# Patient Record
Sex: MALE | Race: OTHER | HISPANIC OR LATINO
[De-identification: names, ages, dates, MRNs, and addresses within clinical notes are randomized per-mention and may not be internally consistent; named-entity substitution may affect disease eponyms.]

---

## 2017-01-09 ENCOUNTER — APPOINTMENT (OUTPATIENT)
Dept: OPHTHALMOLOGY | Facility: CLINIC | Age: 47
End: 2017-01-09

## 2017-01-09 ENCOUNTER — OUTPATIENT (OUTPATIENT)
Dept: OUTPATIENT SERVICES | Facility: HOSPITAL | Age: 47
LOS: 1 days | End: 2017-01-09

## 2017-01-30 DIAGNOSIS — H40.10X0 UNSPECIFIED OPEN-ANGLE GLAUCOMA, STAGE UNSPECIFIED: ICD-10-CM

## 2017-04-14 ENCOUNTER — APPOINTMENT (OUTPATIENT)
Dept: INTERNAL MEDICINE | Facility: CLINIC | Age: 47
End: 2017-04-14

## 2017-04-14 ENCOUNTER — OUTPATIENT (OUTPATIENT)
Dept: OUTPATIENT SERVICES | Facility: HOSPITAL | Age: 47
LOS: 1 days | End: 2017-04-14
Payer: SELF-PAY

## 2017-04-14 VITALS
HEIGHT: 65 IN | WEIGHT: 170 LBS | DIASTOLIC BLOOD PRESSURE: 80 MMHG | SYSTOLIC BLOOD PRESSURE: 128 MMHG | HEART RATE: 67 BPM | BODY MASS INDEX: 28.32 KG/M2

## 2017-04-14 DIAGNOSIS — I10 ESSENTIAL (PRIMARY) HYPERTENSION: ICD-10-CM

## 2017-04-14 PROCEDURE — G0463: CPT

## 2017-04-20 DIAGNOSIS — E11.9 TYPE 2 DIABETES MELLITUS WITHOUT COMPLICATIONS: ICD-10-CM

## 2017-04-20 DIAGNOSIS — K76.0 FATTY (CHANGE OF) LIVER, NOT ELSEWHERE CLASSIFIED: ICD-10-CM

## 2017-04-20 DIAGNOSIS — M79.672 PAIN IN LEFT FOOT: ICD-10-CM

## 2017-04-21 ENCOUNTER — CHART COPY (OUTPATIENT)
Age: 47
End: 2017-04-21

## 2017-04-28 ENCOUNTER — APPOINTMENT (OUTPATIENT)
Dept: INTERNAL MEDICINE | Facility: CLINIC | Age: 47
End: 2017-04-28

## 2017-04-28 ENCOUNTER — OUTPATIENT (OUTPATIENT)
Dept: OUTPATIENT SERVICES | Facility: HOSPITAL | Age: 47
LOS: 1 days | End: 2017-04-28
Payer: SELF-PAY

## 2017-04-28 ENCOUNTER — LABORATORY RESULT (OUTPATIENT)
Age: 47
End: 2017-04-28

## 2017-04-28 VITALS
BODY MASS INDEX: 28.16 KG/M2 | OXYGEN SATURATION: 96 % | HEIGHT: 65 IN | SYSTOLIC BLOOD PRESSURE: 120 MMHG | DIASTOLIC BLOOD PRESSURE: 80 MMHG | HEART RATE: 86 BPM | WEIGHT: 169 LBS

## 2017-04-28 DIAGNOSIS — M79.672 PAIN IN LEFT FOOT: ICD-10-CM

## 2017-04-28 DIAGNOSIS — K76.0 FATTY (CHANGE OF) LIVER, NOT ELSEWHERE CLASSIFIED: ICD-10-CM

## 2017-04-28 DIAGNOSIS — E11.9 TYPE 2 DIABETES MELLITUS WITHOUT COMPLICATIONS: ICD-10-CM

## 2017-04-28 DIAGNOSIS — I10 ESSENTIAL (PRIMARY) HYPERTENSION: ICD-10-CM

## 2017-04-28 DIAGNOSIS — Z23 ENCOUNTER FOR IMMUNIZATION: ICD-10-CM

## 2017-04-28 LAB
CHOLEST SERPL-MCNC: 150 MG/DL — SIGNIFICANT CHANGE UP (ref 10–199)
FRUCTOSAMINE SERPL-MCNC: 370 UMOL/L — HIGH (ref 205–285)
HDLC SERPL-MCNC: 26 MG/DL — LOW (ref 40–125)
LIPID PNL WITH DIRECT LDL SERPL: 81 MG/DL — SIGNIFICANT CHANGE UP
TOTAL CHOLESTEROL/HDL RATIO MEASUREMENT: 5.8 RATIO — SIGNIFICANT CHANGE UP (ref 3.4–9.6)
TRIGL SERPL-MCNC: 217 MG/DL — HIGH (ref 10–149)

## 2017-04-28 PROCEDURE — G0463: CPT

## 2017-04-28 PROCEDURE — 80061 LIPID PANEL: CPT

## 2017-04-28 PROCEDURE — 82985 ASSAY OF GLYCATED PROTEIN: CPT

## 2017-05-09 DIAGNOSIS — E78.1 PURE HYPERGLYCERIDEMIA: ICD-10-CM

## 2017-05-22 ENCOUNTER — APPOINTMENT (OUTPATIENT)
Age: 47
End: 2017-05-22

## 2017-05-22 ENCOUNTER — APPOINTMENT (OUTPATIENT)
Dept: OPHTHALMOLOGY | Facility: CLINIC | Age: 47
End: 2017-05-22

## 2017-05-22 ENCOUNTER — OUTPATIENT (OUTPATIENT)
Dept: OUTPATIENT SERVICES | Facility: HOSPITAL | Age: 47
LOS: 1 days | End: 2017-05-22

## 2017-05-26 ENCOUNTER — APPOINTMENT (OUTPATIENT)
Dept: PODIATRY | Facility: HOSPITAL | Age: 47
End: 2017-05-26

## 2017-05-26 ENCOUNTER — OUTPATIENT (OUTPATIENT)
Dept: OUTPATIENT SERVICES | Facility: HOSPITAL | Age: 47
LOS: 1 days | End: 2017-05-26
Payer: SELF-PAY

## 2017-05-26 VITALS
DIASTOLIC BLOOD PRESSURE: 95 MMHG | HEART RATE: 83 BPM | HEIGHT: 65 IN | BODY MASS INDEX: 28.32 KG/M2 | SYSTOLIC BLOOD PRESSURE: 145 MMHG | WEIGHT: 170 LBS

## 2017-05-26 DIAGNOSIS — M79.609 PAIN IN UNSPECIFIED LIMB: ICD-10-CM

## 2017-05-26 DIAGNOSIS — M72.2 PLANTAR FASCIAL FIBROMATOSIS: ICD-10-CM

## 2017-05-26 PROCEDURE — G0463: CPT

## 2017-05-26 PROCEDURE — 73630 X-RAY EXAM OF FOOT: CPT

## 2017-05-26 PROCEDURE — 73630 X-RAY EXAM OF FOOT: CPT | Mod: 26,50

## 2017-05-30 ENCOUNTER — LABORATORY RESULT (OUTPATIENT)
Age: 47
End: 2017-05-30

## 2017-05-30 ENCOUNTER — APPOINTMENT (OUTPATIENT)
Dept: INTERNAL MEDICINE | Facility: CLINIC | Age: 47
End: 2017-05-30

## 2017-05-30 ENCOUNTER — OUTPATIENT (OUTPATIENT)
Dept: OUTPATIENT SERVICES | Facility: HOSPITAL | Age: 47
LOS: 1 days | End: 2017-05-30
Payer: SELF-PAY

## 2017-05-30 VITALS
HEIGHT: 65 IN | SYSTOLIC BLOOD PRESSURE: 120 MMHG | WEIGHT: 170 LBS | OXYGEN SATURATION: 97 % | HEART RATE: 68 BPM | BODY MASS INDEX: 28.32 KG/M2 | DIASTOLIC BLOOD PRESSURE: 78 MMHG

## 2017-05-30 DIAGNOSIS — K86.1 OTHER CHRONIC PANCREATITIS: ICD-10-CM

## 2017-05-30 DIAGNOSIS — M20.40 OTHER HAMMER TOE(S) (ACQUIRED), UNSPECIFIED FOOT: ICD-10-CM

## 2017-05-30 DIAGNOSIS — R51 HEADACHE: ICD-10-CM

## 2017-05-30 DIAGNOSIS — I10 ESSENTIAL (PRIMARY) HYPERTENSION: ICD-10-CM

## 2017-05-30 DIAGNOSIS — E11.9 TYPE 2 DIABETES MELLITUS WITHOUT COMPLICATIONS: ICD-10-CM

## 2017-05-30 PROCEDURE — 82985 ASSAY OF GLYCATED PROTEIN: CPT

## 2017-05-30 PROCEDURE — G0463: CPT

## 2017-05-31 LAB — FRUCTOSAMINE SERPL-MCNC: 298 UMOL/L — HIGH (ref 205–285)

## 2017-06-09 DIAGNOSIS — R51 HEADACHE: ICD-10-CM

## 2017-06-09 DIAGNOSIS — K86.1 OTHER CHRONIC PANCREATITIS: ICD-10-CM

## 2017-06-09 DIAGNOSIS — E11.9 TYPE 2 DIABETES MELLITUS WITHOUT COMPLICATIONS: ICD-10-CM

## 2017-06-09 DIAGNOSIS — E78.5 HYPERLIPIDEMIA, UNSPECIFIED: ICD-10-CM

## 2017-06-30 ENCOUNTER — OUTPATIENT (OUTPATIENT)
Dept: OUTPATIENT SERVICES | Facility: HOSPITAL | Age: 47
LOS: 1 days | End: 2017-06-30
Payer: SELF-PAY

## 2017-06-30 ENCOUNTER — APPOINTMENT (OUTPATIENT)
Dept: PODIATRY | Facility: HOSPITAL | Age: 47
End: 2017-06-30

## 2017-06-30 VITALS — DIASTOLIC BLOOD PRESSURE: 87 MMHG | HEART RATE: 80 BPM | SYSTOLIC BLOOD PRESSURE: 135 MMHG

## 2017-06-30 VITALS — BODY MASS INDEX: 28.32 KG/M2 | HEIGHT: 65 IN | WEIGHT: 170 LBS | RESPIRATION RATE: 16 BRPM

## 2017-06-30 DIAGNOSIS — M72.2 PLANTAR FASCIAL FIBROMATOSIS: ICD-10-CM

## 2017-06-30 DIAGNOSIS — M20.40 OTHER HAMMER TOE(S) (ACQUIRED), UNSPECIFIED FOOT: ICD-10-CM

## 2017-06-30 DIAGNOSIS — M79.609 PAIN IN UNSPECIFIED LIMB: ICD-10-CM

## 2017-06-30 DIAGNOSIS — E11.9 TYPE 2 DIABETES MELLITUS WITHOUT COMPLICATIONS: ICD-10-CM

## 2017-06-30 PROCEDURE — G0463: CPT

## 2017-07-12 DIAGNOSIS — H40.003 PREGLAUCOMA, UNSPECIFIED, BILATERAL: ICD-10-CM

## 2017-07-28 ENCOUNTER — OUTPATIENT (OUTPATIENT)
Dept: OUTPATIENT SERVICES | Facility: HOSPITAL | Age: 47
LOS: 1 days | End: 2017-07-28
Payer: SELF-PAY

## 2017-07-28 ENCOUNTER — APPOINTMENT (OUTPATIENT)
Dept: PODIATRY | Facility: HOSPITAL | Age: 47
End: 2017-07-28

## 2017-07-28 VITALS
DIASTOLIC BLOOD PRESSURE: 87 MMHG | HEIGHT: 65 IN | BODY MASS INDEX: 28.32 KG/M2 | HEART RATE: 87 BPM | SYSTOLIC BLOOD PRESSURE: 136 MMHG | RESPIRATION RATE: 16 BRPM | WEIGHT: 170 LBS

## 2017-07-28 DIAGNOSIS — M72.2 PLANTAR FASCIAL FIBROMATOSIS: ICD-10-CM

## 2017-07-28 DIAGNOSIS — E11.9 TYPE 2 DIABETES MELLITUS WITHOUT COMPLICATIONS: ICD-10-CM

## 2017-07-28 DIAGNOSIS — M79.609 PAIN IN UNSPECIFIED LIMB: ICD-10-CM

## 2017-07-28 PROCEDURE — G0463: CPT

## 2017-09-08 ENCOUNTER — APPOINTMENT (OUTPATIENT)
Dept: PODIATRY | Facility: HOSPITAL | Age: 47
End: 2017-09-08

## 2017-09-08 ENCOUNTER — OUTPATIENT (OUTPATIENT)
Dept: OUTPATIENT SERVICES | Facility: HOSPITAL | Age: 47
LOS: 1 days | End: 2017-09-08
Payer: SELF-PAY

## 2017-09-08 VITALS
DIASTOLIC BLOOD PRESSURE: 100 MMHG | WEIGHT: 168 LBS | HEART RATE: 74 BPM | BODY MASS INDEX: 27.99 KG/M2 | SYSTOLIC BLOOD PRESSURE: 144 MMHG | HEIGHT: 65 IN | RESPIRATION RATE: 16 BRPM

## 2017-09-08 DIAGNOSIS — M72.2 PLANTAR FASCIAL FIBROMATOSIS: ICD-10-CM

## 2017-09-08 DIAGNOSIS — E11.9 TYPE 2 DIABETES MELLITUS WITHOUT COMPLICATIONS: ICD-10-CM

## 2017-09-08 DIAGNOSIS — M79.609 PAIN IN UNSPECIFIED LIMB: ICD-10-CM

## 2017-09-08 PROCEDURE — G0463: CPT

## 2017-09-29 ENCOUNTER — APPOINTMENT (OUTPATIENT)
Dept: INTERNAL MEDICINE | Facility: CLINIC | Age: 47
End: 2017-09-29

## 2017-10-02 ENCOUNTER — APPOINTMENT (OUTPATIENT)
Dept: INTERNAL MEDICINE | Facility: CLINIC | Age: 47
End: 2017-10-02

## 2017-11-20 ENCOUNTER — APPOINTMENT (OUTPATIENT)
Dept: OPHTHALMOLOGY | Facility: CLINIC | Age: 47
End: 2017-11-20

## 2017-11-20 ENCOUNTER — OUTPATIENT (OUTPATIENT)
Dept: OUTPATIENT SERVICES | Facility: HOSPITAL | Age: 47
LOS: 1 days | End: 2017-11-20

## 2017-11-27 DIAGNOSIS — H40.003 PREGLAUCOMA, UNSPECIFIED, BILATERAL: ICD-10-CM

## 2018-02-26 ENCOUNTER — OUTPATIENT (OUTPATIENT)
Dept: OUTPATIENT SERVICES | Facility: HOSPITAL | Age: 48
LOS: 1 days | End: 2018-02-26
Payer: SELF-PAY

## 2018-02-26 ENCOUNTER — APPOINTMENT (OUTPATIENT)
Dept: INTERNAL MEDICINE | Facility: CLINIC | Age: 48
End: 2018-02-26

## 2018-02-26 VITALS
SYSTOLIC BLOOD PRESSURE: 130 MMHG | DIASTOLIC BLOOD PRESSURE: 60 MMHG | WEIGHT: 163 LBS | HEART RATE: 64 BPM | HEIGHT: 65 IN | BODY MASS INDEX: 27.16 KG/M2

## 2018-02-26 DIAGNOSIS — I10 ESSENTIAL (PRIMARY) HYPERTENSION: ICD-10-CM

## 2018-02-26 DIAGNOSIS — Z23 ENCOUNTER FOR IMMUNIZATION: ICD-10-CM

## 2018-02-26 DIAGNOSIS — J06.9 ACUTE UPPER RESPIRATORY INFECTION, UNSPECIFIED: ICD-10-CM

## 2018-02-26 DIAGNOSIS — E11.9 TYPE 2 DIABETES MELLITUS WITHOUT COMPLICATIONS: ICD-10-CM

## 2018-02-26 PROCEDURE — G0463: CPT

## 2018-02-26 PROCEDURE — G0008: CPT

## 2018-02-26 PROCEDURE — 90688 IIV4 VACCINE SPLT 0.5 ML IM: CPT

## 2018-02-26 PROCEDURE — 83036 HEMOGLOBIN GLYCOSYLATED A1C: CPT

## 2018-02-28 LAB
ALBUMIN SERPL ELPH-MCNC: 4.4 G/DL
ALP BLD-CCNC: 161 U/L
ALT SERPL-CCNC: 128 U/L
ANION GAP SERPL CALC-SCNC: 16 MMOL/L
AST SERPL-CCNC: 51 U/L
BASOPHILS # BLD AUTO: 0.03 K/UL
BASOPHILS NFR BLD AUTO: 0.3 %
BILIRUB SERPL-MCNC: 0.7 MG/DL
BUN SERPL-MCNC: 20 MG/DL
CALCIUM SERPL-MCNC: 9.4 MG/DL
CHLORIDE SERPL-SCNC: 99 MMOL/L
CHOLEST SERPL-MCNC: 205 MG/DL
CHOLEST/HDLC SERPL: 6.8 RATIO
CO2 SERPL-SCNC: 23 MMOL/L
CREAT SERPL-MCNC: 0.69 MG/DL
EOSINOPHIL # BLD AUTO: 0.22 K/UL
EOSINOPHIL NFR BLD AUTO: 1.9 %
GLUCOSE SERPL-MCNC: 257 MG/DL
HBA1C MFR BLD HPLC: 13.2
HCT VFR BLD CALC: 43.8 %
HDLC SERPL-MCNC: 30 MG/DL
HGB BLD-MCNC: 15.1 G/DL
IMM GRANULOCYTES NFR BLD AUTO: 0.3 %
LDLC SERPL CALC-MCNC: NORMAL
LYMPHOCYTES # BLD AUTO: 2.1 K/UL
LYMPHOCYTES NFR BLD AUTO: 18.2 %
MAN DIFF?: NORMAL
MCHC RBC-ENTMCNC: 31.1 PG
MCHC RBC-ENTMCNC: 34.5 GM/DL
MCV RBC AUTO: 90.1 FL
MONOCYTES # BLD AUTO: 1.01 K/UL
MONOCYTES NFR BLD AUTO: 8.7 %
NEUTROPHILS # BLD AUTO: 8.16 K/UL
NEUTROPHILS NFR BLD AUTO: 70.6 %
PLATELET # BLD AUTO: 183 K/UL
POTASSIUM SERPL-SCNC: 4.4 MMOL/L
PROT SERPL-MCNC: 7.9 G/DL
RBC # BLD: 4.86 M/UL
RBC # FLD: 13.3 %
SODIUM SERPL-SCNC: 138 MMOL/L
TRIGL SERPL-MCNC: 488 MG/DL
WBC # FLD AUTO: 11.55 K/UL

## 2018-04-02 ENCOUNTER — LABORATORY RESULT (OUTPATIENT)
Age: 48
End: 2018-04-02

## 2018-04-02 ENCOUNTER — OUTPATIENT (OUTPATIENT)
Dept: OUTPATIENT SERVICES | Facility: HOSPITAL | Age: 48
LOS: 1 days | End: 2018-04-02
Payer: SELF-PAY

## 2018-04-02 ENCOUNTER — APPOINTMENT (OUTPATIENT)
Dept: INTERNAL MEDICINE | Facility: CLINIC | Age: 48
End: 2018-04-02

## 2018-04-02 VITALS
SYSTOLIC BLOOD PRESSURE: 136 MMHG | OXYGEN SATURATION: 98 % | WEIGHT: 159 LBS | HEART RATE: 72 BPM | DIASTOLIC BLOOD PRESSURE: 78 MMHG | BODY MASS INDEX: 26.49 KG/M2 | HEIGHT: 65 IN

## 2018-04-02 DIAGNOSIS — I10 ESSENTIAL (PRIMARY) HYPERTENSION: ICD-10-CM

## 2018-04-02 PROCEDURE — G0463: CPT

## 2018-04-06 DIAGNOSIS — E11.9 TYPE 2 DIABETES MELLITUS WITHOUT COMPLICATIONS: ICD-10-CM

## 2018-04-06 DIAGNOSIS — E78.5 HYPERLIPIDEMIA, UNSPECIFIED: ICD-10-CM

## 2018-04-06 LAB
ALBUMIN SERPL ELPH-MCNC: 4.8 G/DL
ALP BLD-CCNC: 147 U/L
ALT SERPL-CCNC: 81 U/L
ANION GAP SERPL CALC-SCNC: 14 MMOL/L
AST SERPL-CCNC: 51 U/L
BILIRUB SERPL-MCNC: 0.4 MG/DL
BUN SERPL-MCNC: 21 MG/DL
CALCIUM SERPL-MCNC: 9.8 MG/DL
CHLORIDE SERPL-SCNC: 106 MMOL/L
CHOLEST SERPL-MCNC: 170 MG/DL
CHOLEST/HDLC SERPL: 5.3 RATIO
CO2 SERPL-SCNC: 20 MMOL/L
CREAT SERPL-MCNC: 0.7 MG/DL
GLUCOSE SERPL-MCNC: 127 MG/DL
HBV SURFACE AB SER QL: REACTIVE
HBV SURFACE AG SER QL: NONREACTIVE
HDLC SERPL-MCNC: 32 MG/DL
LDLC SERPL CALC-MCNC: 118 MG/DL
POTASSIUM SERPL-SCNC: 4.3 MMOL/L
PROT SERPL-MCNC: 7.7 G/DL
SODIUM SERPL-SCNC: 140 MMOL/L
TRIGL SERPL-MCNC: 100 MG/DL

## 2018-04-09 ENCOUNTER — APPOINTMENT (OUTPATIENT)
Age: 48
End: 2018-04-09

## 2018-04-09 ENCOUNTER — OUTPATIENT (OUTPATIENT)
Dept: OUTPATIENT SERVICES | Facility: HOSPITAL | Age: 48
LOS: 1 days | End: 2018-04-09

## 2018-04-09 ENCOUNTER — APPOINTMENT (OUTPATIENT)
Dept: OPHTHALMOLOGY | Facility: CLINIC | Age: 48
End: 2018-04-09

## 2018-04-10 ENCOUNTER — FORM ENCOUNTER (OUTPATIENT)
Age: 48
End: 2018-04-10

## 2018-04-11 ENCOUNTER — OUTPATIENT (OUTPATIENT)
Dept: OUTPATIENT SERVICES | Facility: HOSPITAL | Age: 48
LOS: 1 days | End: 2018-04-11
Payer: SELF-PAY

## 2018-04-11 ENCOUNTER — APPOINTMENT (OUTPATIENT)
Dept: ULTRASOUND IMAGING | Facility: CLINIC | Age: 48
End: 2018-04-11
Payer: COMMERCIAL

## 2018-04-11 DIAGNOSIS — E11.9 TYPE 2 DIABETES MELLITUS WITHOUT COMPLICATIONS: ICD-10-CM

## 2018-04-11 DIAGNOSIS — E78.5 HYPERLIPIDEMIA, UNSPECIFIED: ICD-10-CM

## 2018-04-11 PROCEDURE — 76705 ECHO EXAM OF ABDOMEN: CPT

## 2018-04-11 PROCEDURE — 76705 ECHO EXAM OF ABDOMEN: CPT | Mod: 26

## 2018-05-07 ENCOUNTER — APPOINTMENT (OUTPATIENT)
Dept: OPHTHALMOLOGY | Facility: CLINIC | Age: 48
End: 2018-05-07

## 2018-06-15 ENCOUNTER — APPOINTMENT (OUTPATIENT)
Dept: INTERNAL MEDICINE | Facility: CLINIC | Age: 48
End: 2018-06-15

## 2019-05-16 ENCOUNTER — MOBILE ON CALL (OUTPATIENT)
Age: 49
End: 2019-05-16

## 2019-05-17 ENCOUNTER — LABORATORY RESULT (OUTPATIENT)
Age: 49
End: 2019-05-17

## 2019-05-17 ENCOUNTER — APPOINTMENT (OUTPATIENT)
Dept: INTERNAL MEDICINE | Facility: CLINIC | Age: 49
End: 2019-05-17

## 2019-05-17 ENCOUNTER — OUTPATIENT (OUTPATIENT)
Dept: OUTPATIENT SERVICES | Facility: HOSPITAL | Age: 49
LOS: 1 days | End: 2019-05-17
Payer: SELF-PAY

## 2019-05-17 VITALS
BODY MASS INDEX: 26.66 KG/M2 | SYSTOLIC BLOOD PRESSURE: 138 MMHG | HEIGHT: 65 IN | WEIGHT: 160 LBS | DIASTOLIC BLOOD PRESSURE: 70 MMHG

## 2019-05-17 DIAGNOSIS — I10 ESSENTIAL (PRIMARY) HYPERTENSION: ICD-10-CM

## 2019-05-17 DIAGNOSIS — K61.0 ANAL ABSCESS: ICD-10-CM

## 2019-05-17 LAB — HBA1C MFR BLD HPLC: 13.8

## 2019-05-17 PROCEDURE — G0463: CPT

## 2019-05-17 PROCEDURE — 83036 HEMOGLOBIN GLYCOSYLATED A1C: CPT

## 2019-05-17 PROCEDURE — 87491 CHLMYD TRACH DNA AMP PROBE: CPT

## 2019-05-17 RX ORDER — METFORMIN HYDROCHLORIDE 500 MG/1
500 TABLET, COATED ORAL TWICE DAILY
Qty: 60 | Refills: 0 | Status: DISCONTINUED | COMMUNITY
Start: 2017-04-28 | End: 2019-05-17

## 2019-05-17 RX ORDER — GLIPIZIDE 5 MG/1
5 TABLET ORAL TWICE DAILY
Qty: 180 | Refills: 3 | Status: DISCONTINUED | COMMUNITY
Start: 2018-02-26 | End: 2019-05-17

## 2019-05-17 RX ORDER — NAPROXEN SODIUM 375 MG/1
375 TABLET, FILM COATED, EXTENDED RELEASE ORAL
Qty: 60 | Refills: 0 | Status: DISCONTINUED | COMMUNITY
Start: 2017-04-28 | End: 2019-05-17

## 2019-05-17 RX ORDER — CEPHALEXIN 500 MG/1
500 CAPSULE ORAL 4 TIMES DAILY
Qty: 56 | Refills: 0 | Status: DISCONTINUED | COMMUNITY
Start: 2019-05-17 | End: 2019-05-17

## 2019-05-18 LAB
C TRACH RRNA SPEC QL NAA+PROBE: SIGNIFICANT CHANGE UP
C TRACH+GC RRNA ANAL QL PROBE: SIGNIFICANT CHANGE UP
CHLAMYDIA/N. GONORRHEA, ANAL/RECTAL, TMA - SOURCE ANAL: SIGNIFICANT CHANGE UP
N GONORRHOEA RRNA SPEC QL NAA+PROBE: SIGNIFICANT CHANGE UP

## 2019-05-20 ENCOUNTER — OUTPATIENT (OUTPATIENT)
Dept: OUTPATIENT SERVICES | Facility: HOSPITAL | Age: 49
LOS: 1 days | End: 2019-05-20
Payer: SELF-PAY

## 2019-05-20 ENCOUNTER — APPOINTMENT (OUTPATIENT)
Dept: SURGERY | Facility: HOSPITAL | Age: 49
End: 2019-05-20

## 2019-05-20 VITALS
BODY MASS INDEX: 25.66 KG/M2 | HEART RATE: 70 BPM | RESPIRATION RATE: 14 BRPM | DIASTOLIC BLOOD PRESSURE: 83 MMHG | SYSTOLIC BLOOD PRESSURE: 131 MMHG | WEIGHT: 154 LBS | HEIGHT: 65 IN

## 2019-05-20 DIAGNOSIS — R30.0 DYSURIA: ICD-10-CM

## 2019-05-20 DIAGNOSIS — E78.1 PURE HYPERGLYCERIDEMIA: ICD-10-CM

## 2019-05-20 DIAGNOSIS — E11.9 TYPE 2 DIABETES MELLITUS WITHOUT COMPLICATIONS: ICD-10-CM

## 2019-05-20 DIAGNOSIS — L72.3 SEBACEOUS CYST: ICD-10-CM

## 2019-05-20 DIAGNOSIS — I73.9 PERIPHERAL VASCULAR DISEASE, UNSPECIFIED: ICD-10-CM

## 2019-05-20 LAB
C TRACH RRNA SPEC QL NAA+PROBE: DETECTED
C TRACH+GC RRNA SPEC QL PROBE: SIGNIFICANT CHANGE UP
CHLAMYDIA/N. GONORRHEA, ORAL/THROAT, TMA - SOURCE ORAL: SIGNIFICANT CHANGE UP
N GONORRHOEA RRNA SPEC QL NAA+PROBE: SIGNIFICANT CHANGE UP

## 2019-05-20 PROCEDURE — G0463: CPT

## 2019-05-20 RX ORDER — SULFAMETHOXAZOLE AND TRIMETHOPRIM 800; 160 MG/1; MG/1
800-160 TABLET ORAL
Qty: 40 | Refills: 0 | Status: DISCONTINUED | COMMUNITY
Start: 2019-05-17 | End: 2019-05-20

## 2019-05-21 DIAGNOSIS — K61.0 ANAL ABSCESS: ICD-10-CM

## 2019-05-22 NOTE — HISTORY OF PRESENT ILLNESS
[FreeTextEntry1] : perianal lesion [de-identified] : This is a 47yo man, MSM, with HTN, HLD, DMT2, presenting for follow up and also because of a lesion near his anus\par \par #perianal lesion\par - he noticed a bump near his anus for the last week\par - bump has expanded over the course of the last week\par - he said he tried to "squeeze" the lesion and pus and blood came out\par - also for the last 2 days has been taking his friends ampicillin 500mg which his friend got from [place]\par \par He is sexually active with multiple partners. States that he tries to use condoms consistently but that one of his partners the condom broke and partner had lesions on the penis. He produces a picture of his parnter's penis which appears to have herpetic lesions on the tip of the penis. He states he subsequently went to a sexual health center where he was tested for STDs including HIV.\par \par In terms of his other chronic issues the only medication he reports taking is gemfibrozil 500mg.

## 2019-05-22 NOTE — PHYSICAL EXAM

## 2019-05-22 NOTE — ASSESSMENT
[FreeTextEntry1] : 48, MSM, with PMH HTN, uncontrolled DM2, perianal abscess, presenting with perianal abscess and f/u of his chronic conditions\par \par HCM\par - rectal and oral GC/chlamydia screening amplification sent\par - will follow up in 2 weeks re his chronic issues and poor glucose control

## 2019-05-22 NOTE — REVIEW OF SYSTEMS
[Negative] : Heme/Lymph [Fever] : fever [FreeTextEntry8] : redness around the penis [de-identified] : lesion in the perianal area

## 2019-05-24 DIAGNOSIS — K61.0 ANAL ABSCESS: ICD-10-CM

## 2019-05-24 DIAGNOSIS — E78.1 PURE HYPERGLYCERIDEMIA: ICD-10-CM

## 2019-05-24 DIAGNOSIS — E11.9 TYPE 2 DIABETES MELLITUS WITHOUT COMPLICATIONS: ICD-10-CM

## 2019-06-11 ENCOUNTER — LABORATORY RESULT (OUTPATIENT)
Age: 49
End: 2019-06-11

## 2019-06-12 ENCOUNTER — OUTPATIENT (OUTPATIENT)
Dept: OUTPATIENT SERVICES | Facility: HOSPITAL | Age: 49
LOS: 1 days | End: 2019-06-12
Payer: SELF-PAY

## 2019-06-12 ENCOUNTER — APPOINTMENT (OUTPATIENT)
Dept: INTERNAL MEDICINE | Facility: CLINIC | Age: 49
End: 2019-06-12

## 2019-06-12 VITALS
WEIGHT: 156 LBS | HEIGHT: 65 IN | BODY MASS INDEX: 25.99 KG/M2 | SYSTOLIC BLOOD PRESSURE: 122 MMHG | DIASTOLIC BLOOD PRESSURE: 70 MMHG

## 2019-06-12 DIAGNOSIS — E78.1 PURE HYPERGLYCERIDEMIA: ICD-10-CM

## 2019-06-12 DIAGNOSIS — E11.9 TYPE 2 DIABETES MELLITUS WITHOUT COMPLICATIONS: ICD-10-CM

## 2019-06-12 DIAGNOSIS — A74.9 CHLAMYDIAL INFECTION, UNSPECIFIED: ICD-10-CM

## 2019-06-12 DIAGNOSIS — I10 ESSENTIAL (PRIMARY) HYPERTENSION: ICD-10-CM

## 2019-06-12 PROCEDURE — 85027 COMPLETE CBC AUTOMATED: CPT

## 2019-06-12 PROCEDURE — 84443 ASSAY THYROID STIM HORMONE: CPT

## 2019-06-12 PROCEDURE — 80061 LIPID PANEL: CPT

## 2019-06-12 PROCEDURE — 87491 CHLMYD TRACH DNA AMP PROBE: CPT

## 2019-06-12 PROCEDURE — 87591 N.GONORRHOEAE DNA AMP PROB: CPT

## 2019-06-12 PROCEDURE — 80053 COMPREHEN METABOLIC PANEL: CPT

## 2019-06-12 PROCEDURE — 86780 TREPONEMA PALLIDUM: CPT

## 2019-06-12 PROCEDURE — 86709 HEPATITIS A IGM ANTIBODY: CPT

## 2019-06-12 PROCEDURE — 36415 COLL VENOUS BLD VENIPUNCTURE: CPT

## 2019-06-12 PROCEDURE — 87389 HIV-1 AG W/HIV-1&-2 AB AG IA: CPT

## 2019-06-12 PROCEDURE — G0463: CPT

## 2019-06-12 PROCEDURE — 86708 HEPATITIS A ANTIBODY: CPT

## 2019-06-12 PROCEDURE — 82043 UR ALBUMIN QUANTITATIVE: CPT

## 2019-06-12 RX ORDER — AZITHROMYCIN 500 MG/1
500 TABLET, FILM COATED ORAL ONCE
Qty: 2 | Refills: 0 | Status: DISCONTINUED | COMMUNITY
Start: 2019-05-20 | End: 2019-06-12

## 2019-06-12 RX ORDER — AMOXICILLIN AND CLAVULANATE POTASSIUM 500; 125 MG/1; MG/1
500-125 TABLET, FILM COATED ORAL
Qty: 10 | Refills: 0 | Status: DISCONTINUED | COMMUNITY
Start: 2019-05-20 | End: 2019-06-12

## 2019-06-12 RX ORDER — CEPHALEXIN 500 MG/1
500 CAPSULE ORAL EVERY 6 HOURS
Qty: 28 | Refills: 0 | Status: DISCONTINUED | COMMUNITY
Start: 2019-05-20 | End: 2019-06-12

## 2019-06-12 NOTE — PHYSICAL EXAM
[No Acute Distress] : no acute distress [Well Developed] : well developed [Well Nourished] : well nourished [Normal Sclera/Conjunctiva] : normal sclera/conjunctiva [Well-Appearing] : well-appearing [No Respiratory Distress] : no respiratory distress  [No Lymphadenopathy] : no lymphadenopathy [Supple] : supple [No Accessory Muscle Use] : no accessory muscle use [Clear to Auscultation] : lungs were clear to auscultation bilaterally [Normal S1, S2] : normal S1 and S2 [Normal Rate] : normal rate  [Regular Rhythm] : with a regular rhythm [No Edema] : there was no peripheral edema [No Murmur] : no murmur heard [Soft] : abdomen soft [Non Tender] : non-tender [No Rash] : no rash [No Focal Deficits] : no focal deficits [Non-distended] : non-distended [Normal Affect] : the affect was normal [Alert and Oriented x3] : oriented to person, place, and time

## 2019-06-13 LAB
ALBUMIN SERPL ELPH-MCNC: 4.9 G/DL — SIGNIFICANT CHANGE UP (ref 3.3–5)
ALP SERPL-CCNC: 157 U/L — HIGH (ref 40–120)
ALT FLD-CCNC: 41 U/L — SIGNIFICANT CHANGE UP (ref 10–45)
ANION GAP SERPL CALC-SCNC: 15 MMOL/L — SIGNIFICANT CHANGE UP (ref 5–17)
AST SERPL-CCNC: 30 U/L — SIGNIFICANT CHANGE UP (ref 10–40)
BILIRUB SERPL-MCNC: 0.6 MG/DL — SIGNIFICANT CHANGE UP (ref 0.2–1.2)
BUN SERPL-MCNC: 15 MG/DL — SIGNIFICANT CHANGE UP (ref 7–23)
C TRACH RRNA SPEC QL NAA+PROBE: SIGNIFICANT CHANGE UP
CALCIUM SERPL-MCNC: 9.5 MG/DL — SIGNIFICANT CHANGE UP (ref 8.4–10.5)
CHLORIDE SERPL-SCNC: 97 MMOL/L — SIGNIFICANT CHANGE UP (ref 96–108)
CHOLEST SERPL-MCNC: 234 MG/DL — HIGH (ref 10–199)
CO2 SERPL-SCNC: 23 MMOL/L — SIGNIFICANT CHANGE UP (ref 22–31)
CREAT ?TM UR-MCNC: 34 MG/DL — SIGNIFICANT CHANGE UP
CREAT SERPL-MCNC: 0.61 MG/DL — SIGNIFICANT CHANGE UP (ref 0.5–1.3)
GLUCOSE BLDC GLUCOMTR-MCNC: 270
GLUCOSE SERPL-MCNC: 286 MG/DL — HIGH (ref 70–99)
HAV IGG SER QL IA: REACTIVE
HAV IGM SER-ACNC: SIGNIFICANT CHANGE UP
HCT VFR BLD CALC: 50.5 % — HIGH (ref 39–50)
HDLC SERPL-MCNC: 30 MG/DL — LOW
HGB BLD-MCNC: 16.2 G/DL — SIGNIFICANT CHANGE UP (ref 13–17)
HIV 1+2 AB+HIV1 P24 AG SERPL QL IA: SIGNIFICANT CHANGE UP
LIPID PNL WITH DIRECT LDL SERPL: SIGNIFICANT CHANGE UP MG/DL
MCHC RBC-ENTMCNC: 31 PG — SIGNIFICANT CHANGE UP (ref 27–34)
MCHC RBC-ENTMCNC: 32.1 GM/DL — SIGNIFICANT CHANGE UP (ref 32–36)
MCV RBC AUTO: 96.7 FL — SIGNIFICANT CHANGE UP (ref 80–100)
MICROALBUMIN UR-MCNC: 9.8 MG/DL — SIGNIFICANT CHANGE UP
MICROALBUMIN/CREAT UR-RTO: 291 MG/G — HIGH (ref 0–30)
N GONORRHOEA RRNA SPEC QL NAA+PROBE: SIGNIFICANT CHANGE UP
PLATELET # BLD AUTO: 207 K/UL — SIGNIFICANT CHANGE UP (ref 150–400)
POTASSIUM SERPL-MCNC: 5 MMOL/L — SIGNIFICANT CHANGE UP (ref 3.5–5.3)
POTASSIUM SERPL-SCNC: 5 MMOL/L — SIGNIFICANT CHANGE UP (ref 3.5–5.3)
PROT SERPL-MCNC: 7.6 G/DL — SIGNIFICANT CHANGE UP (ref 6–8.3)
RBC # BLD: 5.22 M/UL — SIGNIFICANT CHANGE UP (ref 4.2–5.8)
RBC # FLD: 12.9 % — SIGNIFICANT CHANGE UP (ref 10.3–14.5)
SODIUM SERPL-SCNC: 135 MMOL/L — SIGNIFICANT CHANGE UP (ref 135–145)
SPECIMEN SOURCE: SIGNIFICANT CHANGE UP
T PALLIDUM AB TITR SER: NEGATIVE — SIGNIFICANT CHANGE UP
T4 FREE+ TSH PNL SERPL: 1.39 UIU/ML — SIGNIFICANT CHANGE UP (ref 0.27–4.2)
TOTAL CHOLESTEROL/HDL RATIO MEASUREMENT: 7.8 RATIO — SIGNIFICANT CHANGE UP (ref 3.4–9.6)
TRIGL SERPL-MCNC: 789 MG/DL — HIGH (ref 10–149)
WBC # BLD: 5.31 K/UL — SIGNIFICANT CHANGE UP (ref 3.8–10.5)
WBC # FLD AUTO: 5.31 K/UL — SIGNIFICANT CHANGE UP (ref 3.8–10.5)

## 2019-06-19 PROBLEM — A74.9 CHLAMYDIA INFECTION: Status: ACTIVE | Noted: 2019-05-20

## 2019-06-19 NOTE — ASSESSMENT
[FreeTextEntry1] : Patient is a 49yo M with PMH of HTN, HLD, uncontrolled DM2, resolved perianal abscess, and recent throat and anal chlamydia infection. \par \par #Chlamydia Infection and recent unprotected sex (MSM)\par -repeat GC throat culture\par -repeat urine GC \par -will draw titers for HIV and Hep A IgG today as well. \par -Patient encouraged to return to [facility] clinic on Monday to follow up re: chlamydia, and have his partner checked as well. Patient prefers to receive prescriptions from this free clinic because they are of no cost to him there. \par \par #DM2\par - order urine microalbumin for today\par - POC fingerstick 270 \par - Will order CMP, CBC as well for today\par - Will prescribe metformin 500mg BID and encourage patient to comply \par - Patient given referral for nutrition consult and encouraged to RTC in 2 weeks to further discuss DM2 management.

## 2019-06-19 NOTE — HISTORY OF PRESENT ILLNESS
[de-identified] : Patient is a 49yo male, PMH of HTN, HLD, DM2, and MSM with recent perianal abscess and throat/anal chlamydia infection, now resolved after 7-day course of doxycycline, who present for follow up of his chronic issues. \par \par Patient's fingerstick today was 270. His last A1c on 5/17 was 13.8. Upon being counseled about the importance of starting medication for diabetes, patient is resistant and says he would prefer to try diet and exercise. He was informed about the risks of untreated DM2 and encouraged to start metformin; patient said that after the results of his blood work come back and we still want him to start metformin, he will. \par \par In regards to patient's recent chlamydia infection, patient denies any current symptoms and says he is following up at [facility] with his partner on Monday, at which time his partner will also be tested for chlamydia. Patient reports that he took a 7-day course of doxycycline starting May 22. His perianal abscess has also resolved and he denies perianal pain.  [FreeTextEntry1] : follow up

## 2019-06-19 NOTE — END OF VISIT
[] : Resident [FreeTextEntry3] : As noted. F/U planned at [facility] as per pt request

## 2019-06-21 DIAGNOSIS — A74.9 CHLAMYDIAL INFECTION, UNSPECIFIED: ICD-10-CM

## 2019-07-11 ENCOUNTER — APPOINTMENT (OUTPATIENT)
Dept: INTERNAL MEDICINE | Facility: CLINIC | Age: 49
End: 2019-07-11

## 2019-07-11 ENCOUNTER — OUTPATIENT (OUTPATIENT)
Dept: OUTPATIENT SERVICES | Facility: HOSPITAL | Age: 49
LOS: 1 days | End: 2019-07-11
Payer: SELF-PAY

## 2019-07-11 VITALS
WEIGHT: 155 LBS | HEIGHT: 65 IN | DIASTOLIC BLOOD PRESSURE: 80 MMHG | BODY MASS INDEX: 25.83 KG/M2 | HEART RATE: 82 BPM | SYSTOLIC BLOOD PRESSURE: 136 MMHG | OXYGEN SATURATION: 97 %

## 2019-07-11 DIAGNOSIS — I10 ESSENTIAL (PRIMARY) HYPERTENSION: ICD-10-CM

## 2019-07-11 DIAGNOSIS — Z72.52 HIGH RISK HOMOSEXUAL BEHAVIOR: ICD-10-CM

## 2019-07-11 PROCEDURE — G0463: CPT

## 2019-07-17 DIAGNOSIS — Z72.52 HIGH RISK HOMOSEXUAL BEHAVIOR: ICD-10-CM

## 2019-07-17 DIAGNOSIS — E78.1 PURE HYPERGLYCERIDEMIA: ICD-10-CM

## 2019-07-17 DIAGNOSIS — E11.9 TYPE 2 DIABETES MELLITUS WITHOUT COMPLICATIONS: ICD-10-CM

## 2019-07-22 ENCOUNTER — APPOINTMENT (OUTPATIENT)
Dept: PROCTOLOGY | Facility: HOSPITAL | Age: 49
End: 2019-07-22

## 2019-08-16 ENCOUNTER — APPOINTMENT (OUTPATIENT)
Dept: INTERNAL MEDICINE | Facility: CLINIC | Age: 49
End: 2019-08-16

## 2020-12-16 PROBLEM — J06.9 VIRAL UPPER RESPIRATORY ILLNESS: Status: RESOLVED | Noted: 2018-02-27 | Resolved: 2020-12-16

## 2021-09-16 ENCOUNTER — APPOINTMENT (OUTPATIENT)
Dept: INTERNAL MEDICINE | Facility: CLINIC | Age: 51
End: 2021-09-16

## 2021-09-23 ENCOUNTER — LABORATORY RESULT (OUTPATIENT)
Age: 51
End: 2021-09-23

## 2021-09-23 ENCOUNTER — OUTPATIENT (OUTPATIENT)
Dept: OUTPATIENT SERVICES | Facility: HOSPITAL | Age: 51
LOS: 1 days | End: 2021-09-23
Payer: SELF-PAY

## 2021-09-23 ENCOUNTER — APPOINTMENT (OUTPATIENT)
Dept: INTERNAL MEDICINE | Facility: CLINIC | Age: 51
End: 2021-09-23

## 2021-09-23 ENCOUNTER — NON-APPOINTMENT (OUTPATIENT)
Age: 51
End: 2021-09-23

## 2021-09-23 VITALS
DIASTOLIC BLOOD PRESSURE: 84 MMHG | HEIGHT: 64 IN | SYSTOLIC BLOOD PRESSURE: 140 MMHG | WEIGHT: 150 LBS | BODY MASS INDEX: 25.61 KG/M2 | OXYGEN SATURATION: 97 % | HEART RATE: 80 BPM

## 2021-09-23 DIAGNOSIS — I10 ESSENTIAL (PRIMARY) HYPERTENSION: ICD-10-CM

## 2021-09-23 DIAGNOSIS — Z13.5 ENCOUNTER FOR SCREENING FOR EYE AND EAR DISORDERS: ICD-10-CM

## 2021-09-23 RX ORDER — BLOOD PRESSURE TEST KIT-MEDIUM
KIT MISCELLANEOUS
Qty: 1 | Refills: 0 | Status: ACTIVE | COMMUNITY
Start: 2021-09-23 | End: 1900-01-01

## 2021-09-24 ENCOUNTER — NON-APPOINTMENT (OUTPATIENT)
Age: 51
End: 2021-09-24

## 2021-09-24 LAB
ALBUMIN SERPL ELPH-MCNC: 4.5 G/DL — SIGNIFICANT CHANGE UP (ref 3.3–5)
ALP SERPL-CCNC: 152 U/L — HIGH (ref 40–120)
ALT FLD-CCNC: 50 U/L — HIGH (ref 10–45)
ANION GAP SERPL CALC-SCNC: 12 MMOL/L — SIGNIFICANT CHANGE UP (ref 5–17)
AST SERPL-CCNC: 29 U/L — SIGNIFICANT CHANGE UP (ref 10–40)
BILIRUB DIRECT SERPL-MCNC: 0.1 MG/DL — SIGNIFICANT CHANGE UP (ref 0–0.2)
BILIRUB INDIRECT FLD-MCNC: 0.4 MG/DL — SIGNIFICANT CHANGE UP (ref 0.2–1.2)
BILIRUB SERPL-MCNC: 0.5 MG/DL — SIGNIFICANT CHANGE UP (ref 0.2–1.2)
BUN SERPL-MCNC: 11 MG/DL — SIGNIFICANT CHANGE UP (ref 7–23)
CALCIUM SERPL-MCNC: 9.5 MG/DL — SIGNIFICANT CHANGE UP (ref 8.4–10.5)
CHLORIDE SERPL-SCNC: 94 MMOL/L — LOW (ref 96–108)
CHOLEST SERPL-MCNC: 445 MG/DL — HIGH
CO2 SERPL-SCNC: 22 MMOL/L — SIGNIFICANT CHANGE UP (ref 22–31)
CREAT ?TM UR-MCNC: 29 MG/DL — SIGNIFICANT CHANGE UP
CREAT SERPL-MCNC: 0.56 MG/DL — SIGNIFICANT CHANGE UP (ref 0.5–1.3)
GLUCOSE BLDC GLUCOMTR-MCNC: NORMAL
GLUCOSE SERPL-MCNC: 237 MG/DL — HIGH (ref 70–99)
HDLC SERPL-MCNC: 19 MG/DL — LOW
LIPID PNL WITH DIRECT LDL SERPL: SIGNIFICANT CHANGE UP MG/DL
MICROALBUMIN UR-MCNC: 12.6 MG/DL — SIGNIFICANT CHANGE UP
MICROALBUMIN/CREAT UR-RTO: 428 MG/G — HIGH (ref 0–30)
NON HDL CHOLESTEROL: 426 MG/DL — HIGH
POTASSIUM SERPL-MCNC: 4.9 MMOL/L — SIGNIFICANT CHANGE UP (ref 3.5–5.3)
POTASSIUM SERPL-SCNC: 4.9 MMOL/L — SIGNIFICANT CHANGE UP (ref 3.5–5.3)
PROT SERPL-MCNC: 7.2 G/DL — SIGNIFICANT CHANGE UP (ref 6–8.3)
SODIUM SERPL-SCNC: 128 MMOL/L — LOW (ref 135–145)
TRIGL SERPL-MCNC: 2039 MG/DL — HIGH
VIT B12 SERPL-MCNC: 599 PG/ML — SIGNIFICANT CHANGE UP (ref 232–1245)

## 2021-09-24 NOTE — ASSESSMENT
[FreeTextEntry1] : 51 y/o m w/ hx of uncontrolled T2DM presenting with lower extremity paresthesias and numbness for 8 months. Patient has not taken medications for years, and has poor follow-up. A1c 5.6 today in office, and patient is open to start taking medicatiosn again.

## 2021-09-24 NOTE — PLAN
[FreeTextEntry1] : \par #T2DM\par - POCT A1c and POCT FS \par - Start Metformin 500mg again \par - Counseled on healthy eating habits, weight loss and exercise.\par - Encouraged to check FS at home and log it \par - BMP, microalbumin/cr in today's visit \par - Opthalmology referral\par \par #HCM\par - Patient declined Flu shot \par - PHQ2\par - Lipid panel this visit \par - B12 serum this visit \par - Sent Rx for BP Cuff to pharmacy \par \par RTC in 5 weeks \par D/w Dr. Nelson.

## 2021-09-24 NOTE — HISTORY OF PRESENT ILLNESS
[FreeTextEntry1] : 49 Y/O M w Hx of T2DM, hypertriglyceridemia, perianal abscess presenting for tingling in both legs.  [de-identified] : 51 y/o M w Hx of T2DM, hypertriglyceridemia, presenting for bilateral tingling of both legs that started in January of this year. Patient says he stopped taking his Metformin and Gerfibrozil "years ago", because he didn't like taking it and it gave him a "choking" sensation. The patient instead started eating an all-vegetable diet, and making vegetable smoothies. He does not eat sugary foods, and does not eat take out food. He exercises on a routine basis with yoga, meditation, and dancing. Patient checks his sugar at home and 1 month ago it ranged up to the 400s. However, more recently it has been in the 120s. Patient understands that needs to take medication for his diabetes, and is open to taking it again.\par Patient denies fevers, chills, weight loss, Headaches, SOB, CP, Palpitations, N/V, Abd pain, Diarrhea, constipation, dysuria

## 2021-09-24 NOTE — HEALTH RISK ASSESSMENT
[0] : 2) Feeling down, depressed, or hopeless: Not at all (0) [PHQ-2 Negative - No further assessment needed] : PHQ-2 Negative - No further assessment needed [[id number]] : 0

## 2021-09-24 NOTE — END OF VISIT
[] : Resident [FreeTextEntry3] : 49yo M with PMhx of DM, HLD presents for f/u. Making great diet changes, but also hasn't taken medications in yrs? Now complaining of numbness/tingling in hands/feet. Exam notable for mild elevated BP, otherwise CP/ lung exam unremarkable. Note previous a1c extremely high to 13, however POCT a1c in office today 5.6. Suspect > 13 a1cs were more likely spurious given previous history of well-controlled DM. Overall, given hx of nonadherence to meds, will opt to restart Metformin now and add-on statin/possible ARB after labs and at close f/u.

## 2021-09-24 NOTE — PHYSICAL EXAM
[No Acute Distress] : no acute distress [Well-Appearing] : well-appearing [Normal Sclera/Conjunctiva] : normal sclera/conjunctiva [EOMI] : extraocular movements intact [No JVD] : no jugular venous distention [No Lymphadenopathy] : no lymphadenopathy [Supple] : supple [No Respiratory Distress] : no respiratory distress  [No Accessory Muscle Use] : no accessory muscle use [Clear to Auscultation] : lungs were clear to auscultation bilaterally [Normal Rate] : normal rate  [Regular Rhythm] : with a regular rhythm [Normal S1, S2] : normal S1 and S2 [No Murmur] : no murmur heard [No Abdominal Bruit] : a ~M bruit was not heard ~T in the abdomen [No Edema] : there was no peripheral edema [Soft] : abdomen soft [Non Tender] : non-tender [Non-distended] : non-distended [Normal Bowel Sounds] : normal bowel sounds [No Joint Swelling] : no joint swelling [de-identified] : Patient has decreased sensation in palmar, dorsal aspect of bilateral feet; no ulcers  [de-identified] : Thin-appearing

## 2021-09-27 ENCOUNTER — NON-APPOINTMENT (OUTPATIENT)
Age: 51
End: 2021-09-27

## 2021-09-27 DIAGNOSIS — Z13.5 ENCOUNTER FOR SCREENING FOR EYE AND EAR DISORDERS: ICD-10-CM

## 2021-09-27 DIAGNOSIS — E78.1 PURE HYPERGLYCERIDEMIA: ICD-10-CM

## 2021-09-27 DIAGNOSIS — E11.9 TYPE 2 DIABETES MELLITUS WITHOUT COMPLICATIONS: ICD-10-CM

## 2021-10-21 ENCOUNTER — LABORATORY RESULT (OUTPATIENT)
Age: 51
End: 2021-10-21

## 2021-10-21 LAB
ANION GAP SERPL CALC-SCNC: 14 MMOL/L — SIGNIFICANT CHANGE UP (ref 5–17)
BUN SERPL-MCNC: 16 MG/DL — SIGNIFICANT CHANGE UP (ref 7–23)
CALCIUM SERPL-MCNC: 9.5 MG/DL — SIGNIFICANT CHANGE UP (ref 8.4–10.5)
CHLORIDE SERPL-SCNC: 99 MMOL/L — SIGNIFICANT CHANGE UP (ref 96–108)
CHOLEST SERPL-MCNC: 257 MG/DL — HIGH
CO2 SERPL-SCNC: 22 MMOL/L — SIGNIFICANT CHANGE UP (ref 22–31)
CREAT SERPL-MCNC: 0.61 MG/DL — SIGNIFICANT CHANGE UP (ref 0.5–1.3)
GLUCOSE SERPL-MCNC: 296 MG/DL — HIGH (ref 70–99)
HDLC SERPL-MCNC: 34 MG/DL — LOW
LIPID PNL WITH DIRECT LDL SERPL: SIGNIFICANT CHANGE UP MG/DL
NON HDL CHOLESTEROL: 223 MG/DL — HIGH
POTASSIUM SERPL-MCNC: 4.3 MMOL/L — SIGNIFICANT CHANGE UP (ref 3.5–5.3)
POTASSIUM SERPL-SCNC: 4.3 MMOL/L — SIGNIFICANT CHANGE UP (ref 3.5–5.3)
SODIUM SERPL-SCNC: 135 MMOL/L — SIGNIFICANT CHANGE UP (ref 135–145)
TRIGL SERPL-MCNC: 601 MG/DL — HIGH
VIT B12 SERPL-MCNC: 534 PG/ML — SIGNIFICANT CHANGE UP (ref 232–1245)

## 2021-10-21 PROCEDURE — 80048 BASIC METABOLIC PNL TOTAL CA: CPT

## 2021-10-21 PROCEDURE — 83036 HEMOGLOBIN GLYCOSYLATED A1C: CPT

## 2021-10-21 PROCEDURE — 82607 VITAMIN B-12: CPT

## 2021-10-21 PROCEDURE — 36415 COLL VENOUS BLD VENIPUNCTURE: CPT

## 2021-10-21 PROCEDURE — 82043 UR ALBUMIN QUANTITATIVE: CPT

## 2021-10-21 PROCEDURE — 80076 HEPATIC FUNCTION PANEL: CPT

## 2021-10-21 PROCEDURE — G0463: CPT | Mod: 25

## 2021-10-21 PROCEDURE — 80061 LIPID PANEL: CPT

## 2021-10-22 ENCOUNTER — NON-APPOINTMENT (OUTPATIENT)
Age: 51
End: 2021-10-22

## 2021-10-22 LAB
CREAT ?TM UR-MCNC: 79 MG/DL — SIGNIFICANT CHANGE UP
MICROALBUMIN UR-MCNC: 13.6 MG/DL — SIGNIFICANT CHANGE UP
MICROALBUMIN/CREAT UR-RTO: 173 MG/G — HIGH (ref 0–30)

## 2021-11-18 ENCOUNTER — NON-APPOINTMENT (OUTPATIENT)
Age: 51
End: 2021-11-18

## 2021-12-05 ENCOUNTER — LABORATORY RESULT (OUTPATIENT)
Age: 51
End: 2021-12-05

## 2021-12-06 ENCOUNTER — OUTPATIENT (OUTPATIENT)
Dept: OUTPATIENT SERVICES | Facility: HOSPITAL | Age: 51
LOS: 1 days | End: 2021-12-06
Payer: SELF-PAY

## 2021-12-06 ENCOUNTER — APPOINTMENT (OUTPATIENT)
Dept: INTERNAL MEDICINE | Facility: CLINIC | Age: 51
End: 2021-12-06

## 2021-12-06 VITALS
WEIGHT: 149 LBS | SYSTOLIC BLOOD PRESSURE: 142 MMHG | HEIGHT: 64 IN | DIASTOLIC BLOOD PRESSURE: 90 MMHG | OXYGEN SATURATION: 97 % | HEART RATE: 85 BPM | BODY MASS INDEX: 25.44 KG/M2

## 2021-12-06 DIAGNOSIS — R23.8 OTHER SKIN CHANGES: ICD-10-CM

## 2021-12-06 DIAGNOSIS — B35.1 TINEA UNGUIUM: ICD-10-CM

## 2021-12-06 DIAGNOSIS — I10 ESSENTIAL (PRIMARY) HYPERTENSION: ICD-10-CM

## 2021-12-06 DIAGNOSIS — R74.01 ELEVATION OF LEVELS OF LIVER TRANSAMINASE LEVELS: ICD-10-CM

## 2021-12-06 PROCEDURE — 36415 COLL VENOUS BLD VENIPUNCTURE: CPT

## 2021-12-06 PROCEDURE — 80061 LIPID PANEL: CPT

## 2021-12-06 PROCEDURE — 83036 HEMOGLOBIN GLYCOSYLATED A1C: CPT

## 2021-12-06 PROCEDURE — G0463: CPT | Mod: 25

## 2021-12-06 RX ORDER — METFORMIN HYDROCHLORIDE 500 MG/1
500 TABLET, COATED ORAL
Qty: 60 | Refills: 2 | Status: DISCONTINUED | COMMUNITY
Start: 2019-06-12 | End: 2021-12-06

## 2021-12-07 LAB
CHOLEST SERPL-MCNC: 275 MG/DL — HIGH
HBA1C MFR BLD HPLC: 11.5
HDLC SERPL-MCNC: 33 MG/DL — LOW
LIPID PNL WITH DIRECT LDL SERPL: SIGNIFICANT CHANGE UP MG/DL
NON HDL CHOLESTEROL: 242 MG/DL — HIGH
TRIGL SERPL-MCNC: 807 MG/DL — HIGH

## 2021-12-08 PROBLEM — R23.8 SCALP IRRITATION: Status: ACTIVE | Noted: 2021-12-08

## 2021-12-08 PROBLEM — R74.01 TRANSAMINITIS: Status: ACTIVE | Noted: 2021-12-08

## 2021-12-08 LAB — HBA1C MFR BLD HPLC: 5.6

## 2021-12-09 ENCOUNTER — APPOINTMENT (OUTPATIENT)
Dept: ENDOCRINOLOGY | Facility: HOSPITAL | Age: 51
End: 2021-12-09
Payer: COMMERCIAL

## 2021-12-09 ENCOUNTER — OUTPATIENT (OUTPATIENT)
Dept: OUTPATIENT SERVICES | Facility: HOSPITAL | Age: 51
LOS: 1 days | End: 2021-12-09
Payer: SELF-PAY

## 2021-12-09 ENCOUNTER — RESULT CHARGE (OUTPATIENT)
Age: 51
End: 2021-12-09

## 2021-12-09 VITALS
WEIGHT: 148 LBS | HEIGHT: 64 IN | RESPIRATION RATE: 14 BRPM | TEMPERATURE: 97.8 F | BODY MASS INDEX: 25.27 KG/M2 | SYSTOLIC BLOOD PRESSURE: 129 MMHG | DIASTOLIC BLOOD PRESSURE: 85 MMHG | HEART RATE: 83 BPM

## 2021-12-09 DIAGNOSIS — E34.9 ENDOCRINE DISORDER, UNSPECIFIED: ICD-10-CM

## 2021-12-09 LAB — GLUCOSE BLDC GLUCOMTR-MCNC: 275 MG/DL — HIGH (ref 70–99)

## 2021-12-09 PROCEDURE — G0463: CPT

## 2021-12-09 PROCEDURE — ZZZZZ: CPT

## 2021-12-09 PROCEDURE — 86341 ISLET CELL ANTIBODY: CPT

## 2021-12-09 PROCEDURE — 86200 CCP ANTIBODY: CPT

## 2021-12-09 PROCEDURE — 82962 GLUCOSE BLOOD TEST: CPT

## 2021-12-09 RX ORDER — FENOFIBRATE 145 MG/1
145 TABLET, COATED ORAL DAILY
Qty: 60 | Refills: 0 | Status: DISCONTINUED | COMMUNITY
Start: 2021-09-24 | End: 2021-12-09

## 2021-12-09 NOTE — HISTORY OF PRESENT ILLNESS
[FreeTextEntry1] : 49 y/o M with hx of uncontrolled T2DM, c/b nephropathy and neuropathy, and hypertriglyceridemia with hx of pancreatitis, presenting for initial evaluation of T2DM\par \par DM was diagnosed in 2015, after being admitted for pancreatitis. A1c at that time was 6.8%. Metformin was recommended at that time, but patient did not take it because he wanted to try diet and exercise. Over the past two years, he did not take care of his health and attributes his worsening DM to this. WIth the development of neuropathy, he began intermittently taking the Metformin 1000 mg BID since Jan 2021. He has never been on other agents for DM. He takes FS 3-4 times a day. Fasting #s >300s. Before meals, 250-280. Never < 100, never hypoglycemic. \par Has not seen opthalmology due to insurance issues. (+) neuropathy. (+) nephropathy - Microalb/Cr September 2021 was 173 (prescribed ACEi, not taking). No MI or CVA\par \par Also has hypertriglyceridemia. Recent Lipids - , LDL unable to calculate. Prescribed Fenofibrate 145 mg daily and Rosuvastatin 5 mg. Not taking. Was taking Gemfibrozil in the past, but it was changed to Fenofibrate. \par \par Patient is very resistant to medications, and refuses insulin. He wants to try diet/exercise and natural methods to control his medical conditions.

## 2021-12-09 NOTE — PHYSICAL EXAM
[Alert] : alert [No Acute Distress] : no acute distress [No Neck Mass] : no neck mass was observed [No LAD] : no lymphadenopathy [Thyroid Not Enlarged] : the thyroid was not enlarged [No Thyroid Nodules] : no palpable thyroid nodules [Normal PMI] : the apical impulse was normal [No Murmurs] : no murmurs [Normal Rate] : heart rate was normal [Not Tender] : non-tender [Not Distended] : not distended [No Masses] : no abdominal mass palpated [Soft] : abdomen soft [Normal Gait] : normal gait [No Joint Swelling] : no joint swelling seen [Oriented x3] : oriented to person, place, and time [Normal Insight/Judgement] : insight and judgment were intact

## 2021-12-09 NOTE — END OF VISIT
[] : Fellow [FreeTextEntry3] : Mr. RAMO SHERWOOD is a 50 year man with uncontrolled Type 2 DM, poor diet adherence, poor medication adherence, high triglyceride level and history of pancreatitis, presenting for DM management.  Declined insulin therapy. \par Oral regimen limited due to insurance. \par Resume metformin 1000mg bid, start glimepiride 2mg bid and start Actos 15 mg daily and close FU in 3 months\par Patient to monitor glucose 2-3 times daily

## 2021-12-09 NOTE — ASSESSMENT
[FreeTextEntry1] : 49 y/o M with hx of uncontrolled T2DM, c/b nephropathy and neuropathy, and hypertriglyceridemia with hx of pancreatitis, presenting for initial evaluation of T2DM\par \par #Uncontrolled T2DM\par A1c 11.5%. Goal < 7%\par Home regimen: 1000 mg BID\par Ideally, would recommend Humulin 70/30 (insurance constraints) and Metformin 1000 mg BID, but patient adamantly refuses\par -Can continue Metformin 1000 mg BID\par -Start Actos 15 mg daily\par -Start Glimepiride 2 mg BID\par -Diet and exercise discussed. Patient endorses significant self motivation to improve diet/exercise\par -Given thin body habitus - check Type 1 Ab, and C-peptide\par -Opthalmology f/u advised\par -Has microalbuminuria, stressed importance of compliance with Lisinopril 5 mg daily\par \par #Hypertriglyceridemia\par TG now 807. \par -Strongly advised compliance with Rosuvastatin 5 mg and Gemfibrozil 600 mg daily (patient prefers this over Fenofibrate) \par -Will cut down on fried foods and red meat\par \par Discussed the importance of medication compliance, and that despite optimal lifestyle intervention, he requires medication assistance to reach goals. Patient is resistant to medications and frankly states he will not take that many. \par \par RTC in 3 months \par \par DIscussed with Dr. Araiza\par \par Tianna Frias MD\par Endocrine Fellow

## 2021-12-10 DIAGNOSIS — E11.9 TYPE 2 DIABETES MELLITUS WITHOUT COMPLICATIONS: ICD-10-CM

## 2021-12-10 DIAGNOSIS — E78.1 PURE HYPERGLYCERIDEMIA: ICD-10-CM

## 2021-12-10 LAB — GLUCOSE BLDC GLUCOMTR-MCNC: 275

## 2021-12-11 LAB
CCP IGG SERPL-ACNC: <8 UNITS — SIGNIFICANT CHANGE UP
RF+CCP IGG SER-IMP: NEGATIVE — SIGNIFICANT CHANGE UP

## 2021-12-12 LAB — ISLET CELL512 AB SER-ACNC: SIGNIFICANT CHANGE UP

## 2021-12-13 NOTE — HISTORY OF PRESENT ILLNESS
[FreeTextEntry1] : diabetes follow up [de-identified] : Mr. Petersen is a 51 y/o man w hx DM2, hypertriglyceridemia, chronic/cleared hepatitis B infection, chronic pancreatitis here for follow up. \par \par DM2\par - medication - metformin 500 bid but states he sometimes forgets it and also would prefer "natural healing" instead of medications. \par - a1c was 13 in may 2019, 11.6 in sept 2021 (wrongly documented as 5.6, log book of poct a1c confirmed 11.6 and patient made aware of this) today a1c 11.5. \par - extensive discussion with patient about need to take medication rather than natural remedies. \par - discussed to increase metformin to 1000 bid\par - fingerstick - pre breakfast 300,  before lunch and dinner it is 200\par - ophthalmology - last seen 5 years ago, says they denied his insurance. will provide referral again. \par - neuropathy - numbness in b/l plantar feet. worse in january, now improved \par - proteinuria - last albumin to creatinine 173 in oct 2021\par \par hypertriglyceridemia \par - fenofibrate \par - has not been on statin. will start rosuvastatin 5mg qd and closely monitor liver function\par \par HTN\par - 142/90 \par - will start ace inhibitor in setting of diabetes and proteinuria \par \par Transaminitis\par - notes report hepatitis B s/p treatment but pt is unaware of any dx \par - will refer to hepatology \par \par L side of head numbness\par - feels numb in L posterior head, started 6 months ago. not present all the time but unable to specify when it is present. \par \par Tinea pedis\par - will prescribe tinactin\par - podiatry referral for diabetes, onychomycosis in toenails \par \par Denies flu vaccine. \par

## 2021-12-13 NOTE — ASSESSMENT
[FreeTextEntry1] : DM2\par - a1c was 13 in may 2019, 11.6 in sept 2021 (wrongly documented as 5.6, log book of poct a1c confirmed 11.6 and patient made aware of this) today a1c 11.5. \par - extensive discussion with patient about need to take medication rather than natural remedies. \par - discussed to increase metformin to 1000 bid\par - provided ophthalmology referral\par - start rosuvastatin 5mg qd \par \par hypertriglyceridemia \par - fenofibrate continue \par - has not been on statin. will start rosuvastatin 5mg qd and closely monitor liver function\par \par HTN\par - 142/90 \par - start lisinopril 5mg qd in setting of diabetes and proteinuria \par \par Transaminitis\par - notes report hepatitis B s/p treatment but pt is unaware of any dx \par - will refer to hepatology \par \par L side of head numbness\par - feels numb in L posterior head, started 6 months ago. not present all the time but unable to specify when it is present. \par - continue diabetes management. neuro exam wnl. \par \par Tinea pedis\par - will prescribe tinactin\par - podiatry referral for diabetes, onychomycosis in toenails

## 2021-12-14 LAB
GAD65 AB SER-MCNC: 0.05 NMOL/L — HIGH
ISLET CELL512 AB SER-SCNC: 0 NMOL/L — SIGNIFICANT CHANGE UP

## 2021-12-15 DIAGNOSIS — E78.1 PURE HYPERGLYCERIDEMIA: ICD-10-CM

## 2021-12-15 DIAGNOSIS — R74.01 ELEVATION OF LEVELS OF LIVER TRANSAMINASE LEVELS: ICD-10-CM

## 2021-12-15 DIAGNOSIS — B35.1 TINEA UNGUIUM: ICD-10-CM

## 2021-12-15 DIAGNOSIS — E11.9 TYPE 2 DIABETES MELLITUS WITHOUT COMPLICATIONS: ICD-10-CM

## 2021-12-15 DIAGNOSIS — R23.8 OTHER SKIN CHANGES: ICD-10-CM

## 2021-12-19 LAB — ZINC TRANSPORTER 8 AB, RESULT: <15 U/ML — SIGNIFICANT CHANGE UP

## 2021-12-20 ENCOUNTER — OUTPATIENT (OUTPATIENT)
Dept: OUTPATIENT SERVICES | Facility: HOSPITAL | Age: 51
LOS: 1 days | End: 2021-12-20
Payer: SELF-PAY

## 2021-12-20 ENCOUNTER — APPOINTMENT (OUTPATIENT)
Dept: PODIATRY | Facility: HOSPITAL | Age: 51
End: 2021-12-20
Payer: COMMERCIAL

## 2021-12-20 VITALS
DIASTOLIC BLOOD PRESSURE: 86 MMHG | HEART RATE: 100 BPM | TEMPERATURE: 96.5 F | BODY MASS INDEX: 25.78 KG/M2 | HEIGHT: 64 IN | WEIGHT: 151 LBS | SYSTOLIC BLOOD PRESSURE: 137 MMHG

## 2021-12-20 DIAGNOSIS — M79.609 PAIN IN UNSPECIFIED LIMB: ICD-10-CM

## 2021-12-20 DIAGNOSIS — E11.40 TYPE 2 DIABETES MELLITUS WITH DIABETIC NEUROPATHY, UNSPECIFIED: ICD-10-CM

## 2021-12-20 DIAGNOSIS — E11.9 TYPE 2 DIABETES MELLITUS WITHOUT COMPLICATIONS: ICD-10-CM

## 2021-12-20 DIAGNOSIS — B35.3 TINEA PEDIS: ICD-10-CM

## 2021-12-20 PROCEDURE — 99213 OFFICE O/P EST LOW 20 MIN: CPT

## 2021-12-20 PROCEDURE — G0463: CPT

## 2021-12-20 NOTE — PHYSICAL EXAM
[2+] : left foot dorsalis pedis 2+ [Normal Foot/Ankle] : Both lower extremities were exposed and visualized. Standing exam demonstrates normal foot posture and alignment. Hindfoot exam shows no hindfoot valgus or varus [] : normal gait [Skin Color & Pigmentation] : normal skin color and pigmentation [Skin Turgor] : normal skin turgor [Skin Lesions] : no skin lesions [Sensation] : the sensory exam was normal to light touch and pinprick [Ankle Swelling (On Exam)] : not present [FreeTextEntry3] : DP/PT 2/4 b/l, feet feel warm to touch b/l  [FreeTextEntry1] : Touch sensation intact to both feet to the level of digits

## 2021-12-20 NOTE — HISTORY OF PRESENT ILLNESS
[FreeTextEntry1] : 50yo M presents to clinic with complaint of b/l foot numbness which started in January 2021, pt also complains of scaling skin b/l plantar feet. Numbness began in January and pt reports tingling as well. Says the numbness has improved as he tried eating more vegetables. HgA1c from 12/6/21 was 11.5, reports FBG today was around 230. Denies any other pedal complaints. Denies f/n/v/c/sob.

## 2021-12-20 NOTE — ASSESSMENT
[FreeTextEntry1] : 50yo diabetic male w/ b/l tinea pedis and neuropathy\par - pt seen and evaluated\par - discussed importance of glucose control w/ diet and medical management, explained that A1C is very high and should aim to decrease below 6.5% \par - discussed etiology of numbness b/l likely 2/2 hyperglycemia and diabetic neuropathy\par - Recommend to discuss Rx for Gabapentin with primary care doctor if symptoms persist \par - Rx Clotrimazole- Betamethasone cream for b/l tinea pedis. To apply to b/l feet daily\par - Also recommended pt spray shoes with Lysol and change socks daily to prevent fungus \par - Pt showed verbal understanding \par - Attempted aseptic debridement of elongated toenails w/ minor incident to right foot 3rd digit which was cauterized w/ bacitracin and bandaid- instructed pt of signs of acute infection and to report to ED if red/hot/swollen/discharge/or fever \par - RTC 1 month

## 2022-01-24 ENCOUNTER — APPOINTMENT (OUTPATIENT)
Dept: PODIATRY | Facility: HOSPITAL | Age: 52
End: 2022-01-24
Payer: SELF-PAY

## 2022-01-24 ENCOUNTER — OUTPATIENT (OUTPATIENT)
Dept: OUTPATIENT SERVICES | Facility: HOSPITAL | Age: 52
LOS: 1 days | End: 2022-01-24
Payer: SELF-PAY

## 2022-01-24 VITALS
HEART RATE: 85 BPM | SYSTOLIC BLOOD PRESSURE: 170 MMHG | TEMPERATURE: 98.2 F | BODY MASS INDEX: 25.61 KG/M2 | WEIGHT: 150 LBS | HEIGHT: 64 IN | RESPIRATION RATE: 14 BRPM | DIASTOLIC BLOOD PRESSURE: 100 MMHG

## 2022-01-24 DIAGNOSIS — B35.3 TINEA PEDIS: ICD-10-CM

## 2022-01-24 DIAGNOSIS — M20.40 OTHER HAMMER TOE(S) (ACQUIRED), UNSPECIFIED FOOT: ICD-10-CM

## 2022-01-24 DIAGNOSIS — M79.609 PAIN IN UNSPECIFIED LIMB: ICD-10-CM

## 2022-01-24 DIAGNOSIS — K76.0 FATTY (CHANGE OF) LIVER, NOT ELSEWHERE CLASSIFIED: ICD-10-CM

## 2022-01-24 DIAGNOSIS — E11.9 TYPE 2 DIABETES MELLITUS WITHOUT COMPLICATIONS: ICD-10-CM

## 2022-01-24 PROCEDURE — G0463: CPT

## 2022-01-24 PROCEDURE — 99213 OFFICE O/P EST LOW 20 MIN: CPT

## 2022-01-24 NOTE — HISTORY OF PRESENT ILLNESS
[FreeTextEntry1] : 52yo M presents to clinic with complaint of b/l foot numbness which started in January 2021, pt also complains of scaling skin b/l plantar feet. Numbness began in January and pt reports tingling as well. Says the numbness has improved as he tried eating more vegetables. HgA1c from 12/6/21 was 11.5, reports FBG today was around 230. Denies any other pedal complaints. Denies f/n/v/c/sob.

## 2022-01-24 NOTE — ASSESSMENT
[FreeTextEntry1] : 52yo diabetic male w/ b/l tinea pedis and neuropathy\par - pt seen and evaluated\par - discussed importance of glucose control w/ diet and medical management, explained that A1C is very high and should aim to decrease below 6.5% \par - discussed etiology of numbness b/l likely 2/2 hyperglycemia and diabetic neuropathy\par - Recommend to discuss Rx for Gabapentin with primary care doctor if symptoms persist \par - Rx Clotrimazole- Betamethasone cream for b/l tinea pedis. continue apply to b/l feet daily\par - Also recommended pt spray shoes with Lysol and change socks daily to prevent fungus \par - Pt showed verbal understanding \par - Attempted aseptic debridement of elongated toenails \par - RTC 6 months

## 2022-01-24 NOTE — PHYSICAL EXAM
[2+] : left foot dorsalis pedis 2+ [] : normal gait [Normal Foot/Ankle] : Both lower extremities were exposed and visualized. Standing exam demonstrates normal foot posture and alignment. Hindfoot exam shows no hindfoot valgus or varus [Skin Color & Pigmentation] : normal skin color and pigmentation [Skin Turgor] : normal skin turgor [Skin Lesions] : no skin lesions [Sensation] : the sensory exam was normal to light touch and pinprick [Ankle Swelling (On Exam)] : not present [FreeTextEntry3] : DP/PT 2/4 b/l, feet feel warm to touch b/l  [FreeTextEntry1] : Touch sensation intact to both feet to the level of digits

## 2022-02-10 ENCOUNTER — APPOINTMENT (OUTPATIENT)
Dept: OPHTHALMOLOGY | Facility: CLINIC | Age: 52
End: 2022-02-10

## 2022-02-22 ENCOUNTER — APPOINTMENT (OUTPATIENT)
Dept: GASTROENTEROLOGY | Facility: HOSPITAL | Age: 52
End: 2022-02-22

## 2022-02-22 ENCOUNTER — OUTPATIENT (OUTPATIENT)
Dept: OUTPATIENT SERVICES | Facility: HOSPITAL | Age: 52
LOS: 1 days | End: 2022-02-22
Payer: SELF-PAY

## 2022-02-22 VITALS
BODY MASS INDEX: 26.63 KG/M2 | DIASTOLIC BLOOD PRESSURE: 87 MMHG | SYSTOLIC BLOOD PRESSURE: 145 MMHG | TEMPERATURE: 96.1 F | HEART RATE: 97 BPM | WEIGHT: 156 LBS | HEIGHT: 64 IN

## 2022-02-22 DIAGNOSIS — K31.9 DISEASE OF STOMACH AND DUODENUM, UNSPECIFIED: ICD-10-CM

## 2022-02-22 PROCEDURE — 86255 FLUORESCENT ANTIBODY SCREEN: CPT

## 2022-02-22 PROCEDURE — G0463: CPT

## 2022-02-22 PROCEDURE — 80053 COMPREHEN METABOLIC PANEL: CPT

## 2022-02-22 PROCEDURE — 82248 BILIRUBIN DIRECT: CPT

## 2022-02-22 PROCEDURE — 86381 MITOCHONDRIAL ANTIBODY EACH: CPT

## 2022-02-22 PROCEDURE — 85025 COMPLETE CBC W/AUTO DIFF WBC: CPT

## 2022-02-22 PROCEDURE — 86038 ANTINUCLEAR ANTIBODIES: CPT

## 2022-02-22 NOTE — ASSESSMENT
[FreeTextEntry1] : 50 yo M with uncontrolled T2DM (11.5, 12/2021), hyperTG, past HBV infection (dx 2005) who presents for referral to hepatology clinic. \par \par Impression:\par #elevated liver enzymes- likely ESTEVEZ if competing diagnosis is ruled. NAFLD with ESTEVEZ likely 2/2 uncontrolled T2DM, HLD; Had elevated liver enzymes ALT 50,  09/2021; per chart review has had elevated liver enzymes as far back as 2018 in OP setting and inpatient setting as far back as 2012. \par \par #h/o past HBV infection - Per chart review in 11/2011 had +HBV sAb, +HBV total core Ab, neg HBV sAg, +HAV IgG, neg HCV \par \par #colon cancer screening - overdue to screening c-scope (avg risk, no family hx)- offered FIT test and c-scope but pt declined both options\par \par Plan:\par - check CBC, CMP + hepatic panel, INR, AMA, ASMA, RUPERT, IgG\par - order RUQ US\par - recommend continued glycemic control\par \par #[phone] (cell)\par \par RTC in 3 months.\par \par Pt seen and d/w Dr. Mckenzie.\par \par Alejandrina Pal MD\par NS/[facility] GI Fellow, PGY-4\par

## 2022-02-22 NOTE — REVIEW OF SYSTEMS
[As Noted in HPI] : as noted in HPI [Fever] : no fever [Chills] : no chills [Feeling Tired] : not feeling tired [Eye Pain] : no eye pain [Eyesight Problems] : no eyesight problems [Earache] : no earache [Loss Of Hearing] : no hearing loss [Chest Pain] : no chest pain [Palpitations] : no palpitations [Shortness Of Breath] : no shortness of breath [Wheezing] : no wheezing [Cough] : no cough [Dysuria] : no dysuria [Joint Swelling] : no joint swelling [Limb Pain] : no limb pain [Itching] : no itching [Confused] : no confusion [Anxiety] : no anxiety [Muscle Weakness] : no muscle weakness [Feelings Of Weakness] : no feelings of weakness

## 2022-02-22 NOTE — PHYSICAL EXAM
[General Appearance - Alert] : alert [General Appearance - In No Acute Distress] : in no acute distress [General Appearance - Well Nourished] : well nourished [Sclera] : the sclera and conjunctiva were normal [Extraocular Movements] : extraocular movements were intact [Hearing Threshold Finger Rub Not [place]] : hearing was normal [Neck Appearance] : the appearance of the neck was normal [Exaggerated Use Of Accessory Muscles For Inspiration] : no accessory muscle use [Heart Rate And Rhythm] : heart rate was normal and rhythm regular [Heart Sounds] : normal S1 and S2 [Edema] : there was no peripheral edema [Bowel Sounds] : normal bowel sounds [Abdomen Soft] : soft [Abdomen Tenderness] : non-tender [] : no rash [No Focal Deficits] : no focal deficits [Oriented To Time, Place, And Person] : oriented to person, place, and time [Scleral Icterus] : No Scleral Icterus [Abdominal  Ascites] : no ascites [Splenomegaly] : no splenomegaly [Liver Palpable ___ Finger Breadths Below Costal Margin] : was not palpable below costal margin [Asterixis] : no asterixis observed [Jaundice] : No jaundice [Involuntary Movements] : no involuntary movements were seen [Skin Turgor] : normal skin turgor

## 2022-02-22 NOTE — HISTORY OF PRESENT ILLNESS
[de-identified] : 52 yo M with uncontrolled T2DM (11.5, 12/2021), hyperTG, diagnosed with HBV / past infection who presents for referral to hepatology clinic for elevated liver tests and HBV past infection. .\par \par Today he reports no abdominal pain, N/V/D/C, melena, hematochezia, weight loss, night sweats, heart burn, dysphagia, odynophagia. Had elevated liver enzymes ALT 50,  09/2021; per chart review has had elevated liver enzymes as far back as 2018 in OP setting and inpatient setting as far back as 2012.\par \par Per chart review in 11/2011 had +HBV sAb, +HBV total core Ab, neg HBV sAg, +HAV IgG, neg HCV Ab. Did not have HBV DNA PCR checked. Has never been in halfway, no h/o tattoos or shared needles. +MSM, currently sexually active with male partner.\par \par FH: no GI/hepatology related disease or cancers; no family hx of hepatitis\par Soc Hx: no ETOH or tobacco use\par

## 2022-02-23 DIAGNOSIS — R74.8 ABNORMAL LEVELS OF OTHER SERUM ENZYMES: ICD-10-CM

## 2022-02-23 LAB
ALBUMIN SERPL ELPH-MCNC: 5.1 G/DL — HIGH (ref 3.3–5)
ALP SERPL-CCNC: 175 U/L — HIGH (ref 40–120)
ALT FLD-CCNC: 40 U/L — SIGNIFICANT CHANGE UP (ref 10–45)
ANION GAP SERPL CALC-SCNC: 18 MMOL/L — HIGH (ref 5–17)
AST SERPL-CCNC: 24 U/L — SIGNIFICANT CHANGE UP (ref 10–40)
BASOPHILS # BLD AUTO: 0.05 K/UL — SIGNIFICANT CHANGE UP (ref 0–0.2)
BASOPHILS NFR BLD AUTO: 0.8 % — SIGNIFICANT CHANGE UP (ref 0–2)
BILIRUB DIRECT SERPL-MCNC: 0.1 MG/DL — SIGNIFICANT CHANGE UP (ref 0–0.3)
BILIRUB INDIRECT FLD-MCNC: 0 MG/DL — LOW (ref 0.2–1.2)
BILIRUB SERPL-MCNC: 0.2 MG/DL — SIGNIFICANT CHANGE UP (ref 0.2–1.2)
BUN SERPL-MCNC: 21 MG/DL — SIGNIFICANT CHANGE UP (ref 7–23)
CALCIUM SERPL-MCNC: 9.6 MG/DL — SIGNIFICANT CHANGE UP (ref 8.4–10.5)
CHLORIDE SERPL-SCNC: 96 MMOL/L — SIGNIFICANT CHANGE UP (ref 96–108)
CO2 SERPL-SCNC: 20 MMOL/L — LOW (ref 22–31)
CREAT SERPL-MCNC: 0.59 MG/DL — SIGNIFICANT CHANGE UP (ref 0.5–1.3)
EOSINOPHIL # BLD AUTO: 0.12 K/UL — SIGNIFICANT CHANGE UP (ref 0–0.5)
EOSINOPHIL NFR BLD AUTO: 1.8 % — SIGNIFICANT CHANGE UP (ref 0–6)
GLUCOSE SERPL-MCNC: 271 MG/DL — HIGH (ref 70–99)
HCT VFR BLD CALC: 46.8 % — SIGNIFICANT CHANGE UP (ref 39–50)
HGB BLD-MCNC: 16.7 G/DL — SIGNIFICANT CHANGE UP (ref 13–17)
IMM GRANULOCYTES NFR BLD AUTO: 0.5 % — SIGNIFICANT CHANGE UP (ref 0–1.5)
LYMPHOCYTES # BLD AUTO: 2.03 K/UL — SIGNIFICANT CHANGE UP (ref 1–3.3)
LYMPHOCYTES # BLD AUTO: 31.2 % — SIGNIFICANT CHANGE UP (ref 13–44)
MCHC RBC-ENTMCNC: 32.2 PG — SIGNIFICANT CHANGE UP (ref 27–34)
MCHC RBC-ENTMCNC: 35.7 GM/DL — SIGNIFICANT CHANGE UP (ref 32–36)
MCV RBC AUTO: 90.3 FL — SIGNIFICANT CHANGE UP (ref 80–100)
MITOCHONDRIA AB SER-ACNC: SIGNIFICANT CHANGE UP
MONOCYTES # BLD AUTO: 0.61 K/UL — SIGNIFICANT CHANGE UP (ref 0–0.9)
MONOCYTES NFR BLD AUTO: 9.4 % — SIGNIFICANT CHANGE UP (ref 2–14)
NEUTROPHILS # BLD AUTO: 3.67 K/UL — SIGNIFICANT CHANGE UP (ref 1.8–7.4)
NEUTROPHILS NFR BLD AUTO: 56.3 % — SIGNIFICANT CHANGE UP (ref 43–77)
PLATELET # BLD AUTO: 288 K/UL — SIGNIFICANT CHANGE UP (ref 150–400)
POTASSIUM SERPL-MCNC: 4.3 MMOL/L — SIGNIFICANT CHANGE UP (ref 3.5–5.3)
POTASSIUM SERPL-SCNC: 4.3 MMOL/L — SIGNIFICANT CHANGE UP (ref 3.5–5.3)
PROT SERPL-MCNC: 7.6 G/DL — SIGNIFICANT CHANGE UP (ref 6–8.3)
RBC # BLD: 5.18 M/UL — SIGNIFICANT CHANGE UP (ref 4.2–5.8)
RBC # FLD: 12.2 % — SIGNIFICANT CHANGE UP (ref 10.3–14.5)
SMOOTH MUSCLE AB SER-ACNC: SIGNIFICANT CHANGE UP
SODIUM SERPL-SCNC: 134 MMOL/L — LOW (ref 135–145)
WBC # BLD: 6.51 K/UL — SIGNIFICANT CHANGE UP (ref 3.8–10.5)
WBC # FLD AUTO: 6.51 K/UL — SIGNIFICANT CHANGE UP (ref 3.8–10.5)

## 2022-02-24 LAB
ANA PAT FLD IF-IMP: ABNORMAL
ANA TITR SER: ABNORMAL

## 2022-03-07 ENCOUNTER — APPOINTMENT (OUTPATIENT)
Dept: ULTRASOUND IMAGING | Facility: IMAGING CENTER | Age: 52
End: 2022-03-07

## 2022-03-09 ENCOUNTER — APPOINTMENT (OUTPATIENT)
Dept: ULTRASOUND IMAGING | Facility: IMAGING CENTER | Age: 52
End: 2022-03-09
Payer: COMMERCIAL

## 2022-03-09 ENCOUNTER — OUTPATIENT (OUTPATIENT)
Dept: OUTPATIENT SERVICES | Facility: HOSPITAL | Age: 52
LOS: 1 days | End: 2022-03-09
Payer: SELF-PAY

## 2022-03-09 DIAGNOSIS — R74.8 ABNORMAL LEVELS OF OTHER SERUM ENZYMES: ICD-10-CM

## 2022-03-09 PROCEDURE — 76705 ECHO EXAM OF ABDOMEN: CPT | Mod: 26,RT

## 2022-03-09 PROCEDURE — 76705 ECHO EXAM OF ABDOMEN: CPT

## 2022-05-31 ENCOUNTER — APPOINTMENT (OUTPATIENT)
Dept: GASTROENTEROLOGY | Facility: HOSPITAL | Age: 52
End: 2022-05-31
Payer: COMMERCIAL

## 2022-05-31 ENCOUNTER — OUTPATIENT (OUTPATIENT)
Dept: OUTPATIENT SERVICES | Facility: HOSPITAL | Age: 52
LOS: 1 days | End: 2022-05-31
Payer: SELF-PAY

## 2022-05-31 VITALS
DIASTOLIC BLOOD PRESSURE: 94 MMHG | RESPIRATION RATE: 16 BRPM | SYSTOLIC BLOOD PRESSURE: 154 MMHG | HEART RATE: 84 BPM | TEMPERATURE: 97 F | BODY MASS INDEX: 26.63 KG/M2 | WEIGHT: 156 LBS | HEIGHT: 64 IN

## 2022-05-31 DIAGNOSIS — R74.8 ABNORMAL LEVELS OF OTHER SERUM ENZYMES: ICD-10-CM

## 2022-05-31 DIAGNOSIS — K31.9 DISEASE OF STOMACH AND DUODENUM, UNSPECIFIED: ICD-10-CM

## 2022-05-31 DIAGNOSIS — K76.0 FATTY (CHANGE OF) LIVER, NOT ELSEWHERE CLASSIFIED: ICD-10-CM

## 2022-05-31 LAB
A1C WITH ESTIMATED AVERAGE GLUCOSE RESULT: 12.1 % — HIGH (ref 4–5.6)
CERULOPLASMIN SERPL-MCNC: 23 MG/DL — SIGNIFICANT CHANGE UP (ref 15–30)
ESTIMATED AVERAGE GLUCOSE: 301 MG/DL — HIGH (ref 68–114)
FERRITIN SERPL-MCNC: 150 NG/ML — SIGNIFICANT CHANGE UP (ref 30–400)
IGG FLD-MCNC: 891 MG/DL — SIGNIFICANT CHANGE UP (ref 610–1660)

## 2022-05-31 PROCEDURE — G0463: CPT

## 2022-05-31 PROCEDURE — 83540 ASSAY OF IRON: CPT

## 2022-05-31 PROCEDURE — 83550 IRON BINDING TEST: CPT

## 2022-05-31 PROCEDURE — 83036 HEMOGLOBIN GLYCOSYLATED A1C: CPT

## 2022-05-31 PROCEDURE — 82784 ASSAY IGA/IGD/IGG/IGM EACH: CPT

## 2022-05-31 PROCEDURE — ZZZZZ: CPT | Mod: GC

## 2022-05-31 PROCEDURE — 80061 LIPID PANEL: CPT

## 2022-05-31 PROCEDURE — 82390 ASSAY OF CERULOPLASMIN: CPT

## 2022-05-31 PROCEDURE — 82728 ASSAY OF FERRITIN: CPT

## 2022-05-31 NOTE — PHYSICAL EXAM
[General Appearance - Alert] : alert [General Appearance - In No Acute Distress] : in no acute distress [Sclera] : the sclera and conjunctiva were normal [Extraocular Movements] : extraocular movements were intact [Neck Appearance] : the appearance of the neck was normal [Exaggerated Use Of Accessory Muscles For Inspiration] : no accessory muscle use [Abdomen Soft] : soft [Abdomen Tenderness] : non-tender [] : no hepato-splenomegaly [Abdomen Mass (___ Cm)] : no abdominal mass palpated [Abdomen Hernia] : no hernia was discovered [Abnormal Walk] : normal gait [Involuntary Movements] : no involuntary movements were seen [No Focal Deficits] : no focal deficits [Oriented To Time, Place, And Person] : oriented to person, place, and time [Scleral Icterus] : No Scleral Icterus [Abdominal  Ascites] : no ascites [Splenomegaly] : no splenomegaly [Liver Palpable ___ Finger Breadths Below Costal Margin] : was not palpable below costal margin [Asterixis] : no asterixis observed [Cognitive Mini-Mental Status Normal?] : Cognitive Mini Mental Status Exam is normal [Jaundice] : No jaundice [General Appearance - Well Developed] : well developed [Outer Ear] : the ears and nose were normal in appearance [Heart Rate And Rhythm] : heart rate was normal and rhythm regular [Edema] : there was no peripheral edema [No CVA Tenderness] : no ~M costovertebral angle tenderness [Skin Turgor] : normal skin turgor

## 2022-05-31 NOTE — HISTORY OF PRESENT ILLNESS
[___ Month(s) Ago] : [unfilled] month(s) ago [FreeTextEntry1] : 52 yo M with uncontrolled T2DM (11.5, 12/2021), hyperTG, diagnosed with HBV / past infection who presents for referral to hepatology clinic for elevated liver tests and HBV past infection, here to follow up elevated liver chemistries. \par \par Last seen 2/22/22 with plans to check US liver (hepatic steatosis with CBD 4 mm) and AMA, ASMA (both were wnl) and RUPERT (1:320, homogenous). Repeat labs showed ALT 40 from 50,  from 152. T bili and AST wnl. At that time, patient was also offered colonoscopy/FIT which he declined. Today he says he will think about it.

## 2022-05-31 NOTE — REVIEW OF SYSTEMS
[As Noted in HPI] : as noted in HPI [Fever] : no fever [Chills] : no chills [Feeling Tired] : not feeling tired [Eye Pain] : no eye pain [Earache] : no earache [Nosebleeds] : no nosebleeds [Chest Pain] : no chest pain [Palpitations] : no palpitations [Shortness Of Breath] : no shortness of breath [Cough] : no cough [Dysuria] : no dysuria [Itching] : no itching [Confused] : no confusion [Anxiety] : no anxiety [Muscle Weakness] : no muscle weakness [Feelings Of Weakness] : no feelings of weakness [Easy Bleeding] : no tendency for easy bleeding

## 2022-05-31 NOTE — ASSESSMENT
[FreeTextEntry1] : 50 yo M with uncontrolled T2DM (11.5, 12/2021), hyperTG, diagnosed with HBV / past infection who presents for referral to hepatology clinic for elevated liver tests and HBV past infection, here to follow up elevated liver chemistries. \par \par #elevated liver enzymes- likely ESTEVEZ if competing diagnosis is ruled. NAFLD with ESTEVEZ likely 2/2 uncontrolled T2DM, HLD; per chart review has had elevated liver enzymes as far back as 2018 in OP setting and inpatient setting as far back as 2012. ASMA, AMA wnl, elevated RUPERT 1:320 homogeneous, though can be elevated in setting of fatty liver.\par \par #h/o past HBV infection - Per chart review in 11/2011 had +HBV sAb, +HBV total core Ab, neg HBV sAg, +HAV IgG, neg HCV \par \par #colon cancer screening - overdue to screening c-scope (avg risk, no family hx)- patient would like to think about it\par \par Plan:\par - Check Fibroscan\par - Check IGG subset\par - Check Ferritin, iron panel\par - Check A1c, lipid panel\par - recommend continued glycemic control\par *Patient's [phone] (cell)\par \par PReliminary note until signed by Attending.\par \par Le Santos MD\par GI/Hepatology Fellow, PGYV

## 2022-06-01 LAB
CHOLEST SERPL-MCNC: 474 MG/DL — HIGH
HDLC SERPL-MCNC: 21 MG/DL — LOW
IRON SATN MFR SERPL: 112 UG/DL — SIGNIFICANT CHANGE UP (ref 45–165)
IRON SATN MFR SERPL: 26 % — SIGNIFICANT CHANGE UP (ref 16–55)
LIPID PNL WITH DIRECT LDL SERPL: SIGNIFICANT CHANGE UP MG/DL
NON HDL CHOLESTEROL: 453 MG/DL — HIGH
TIBC SERPL-MCNC: 437 UG/DL — HIGH (ref 220–430)
TRIGL SERPL-MCNC: 1999 MG/DL — HIGH
UIBC SERPL-MCNC: 325 UG/DL — SIGNIFICANT CHANGE UP (ref 110–370)

## 2022-09-02 ENCOUNTER — NON-APPOINTMENT (OUTPATIENT)
Age: 52
End: 2022-09-02

## 2022-09-15 ENCOUNTER — OUTPATIENT (OUTPATIENT)
Dept: OUTPATIENT SERVICES | Facility: HOSPITAL | Age: 52
LOS: 1 days | End: 2022-09-15
Payer: SELF-PAY

## 2022-09-15 ENCOUNTER — LABORATORY RESULT (OUTPATIENT)
Age: 52
End: 2022-09-15

## 2022-09-15 ENCOUNTER — APPOINTMENT (OUTPATIENT)
Dept: INTERNAL MEDICINE | Facility: CLINIC | Age: 52
End: 2022-09-15

## 2022-09-15 VITALS
SYSTOLIC BLOOD PRESSURE: 122 MMHG | WEIGHT: 146 LBS | HEIGHT: 64 IN | DIASTOLIC BLOOD PRESSURE: 80 MMHG | BODY MASS INDEX: 24.92 KG/M2 | OXYGEN SATURATION: 98 % | HEART RATE: 83 BPM

## 2022-09-15 DIAGNOSIS — I10 ESSENTIAL (PRIMARY) HYPERTENSION: ICD-10-CM

## 2022-09-15 PROCEDURE — 82043 UR ALBUMIN QUANTITATIVE: CPT

## 2022-09-15 PROCEDURE — G0463: CPT | Mod: 25

## 2022-09-15 PROCEDURE — ZZZZZ: CPT

## 2022-09-15 PROCEDURE — 83036 HEMOGLOBIN GLYCOSYLATED A1C: CPT

## 2022-09-15 PROCEDURE — T1013: CPT

## 2022-09-15 PROCEDURE — 80053 COMPREHEN METABOLIC PANEL: CPT

## 2022-09-15 PROCEDURE — 80061 LIPID PANEL: CPT

## 2022-09-15 PROCEDURE — 85025 COMPLETE CBC W/AUTO DIFF WBC: CPT

## 2022-09-15 PROCEDURE — 83721 ASSAY OF BLOOD LIPOPROTEIN: CPT

## 2022-09-15 RX ORDER — TOLNAFTATE 1 %
1 AEROSOL, SPRAY (GRAM) TOPICAL TWICE DAILY
Qty: 1 | Refills: 1 | Status: COMPLETED | COMMUNITY
Start: 2021-12-06 | End: 2022-09-15

## 2022-09-15 RX ORDER — CLOTRIMAZOLE AND BETAMETHASONE DIPROPIONATE 10; .5 MG/G; MG/G
1-0.05 CREAM TOPICAL TWICE DAILY
Qty: 1 | Refills: 5 | Status: COMPLETED | COMMUNITY
Start: 2021-12-20 | End: 2022-09-15

## 2022-09-15 RX ORDER — PIOGLITAZONE HYDROCHLORIDE 15 MG/1
15 TABLET ORAL DAILY
Qty: 90 | Refills: 0 | Status: COMPLETED | COMMUNITY
Start: 2021-12-09 | End: 2022-09-15

## 2022-09-15 RX ORDER — GLIMEPIRIDE 2 MG/1
2 TABLET ORAL
Qty: 180 | Refills: 1 | Status: COMPLETED | COMMUNITY
Start: 2021-12-09 | End: 2022-09-15

## 2022-09-16 ENCOUNTER — NON-APPOINTMENT (OUTPATIENT)
Age: 52
End: 2022-09-16

## 2022-09-16 RX ORDER — ROSUVASTATIN CALCIUM 5 MG/1
5 TABLET, FILM COATED ORAL DAILY
Qty: 60 | Refills: 0 | Status: COMPLETED | COMMUNITY
Start: 2021-12-06 | End: 2022-09-16

## 2022-09-16 NOTE — INTERPRETER SERVICES
[[employer] ] : provided by [employer]   [Time Spent: ____ minutes] : Total time spent using  services: [unfilled] minutes. The patient's primary language is not English thus required  services. [Interpreters_IDNumber] : [id number] [Interpreters_FullName] : Constanza [TWNoteComboBox1] : Belarusian

## 2022-09-16 NOTE — HEALTH RISK ASSESSMENT
[Never] : Never [0-4] : 0-4 [1 or 2 (0 pts)] : 1 or 2 (0 points) [Never (0 pts)] : Never (0 points) [No] : In the past 12 months have you used drugs other than those required for medical reasons? No [No falls in past year] : Patient reported no falls in the past year [0] : 2) Feeling down, depressed, or hopeless: Not at all (0) [PHQ-2 Negative - No further assessment needed] : PHQ-2 Negative - No further assessment needed [Audit-CScore] : 0 [IGC7Npvoc] : 0

## 2022-09-16 NOTE — HISTORY OF PRESENT ILLNESS
[FreeTextEntry8] : 50 y/o man with PMH of DM2 (A1C 11.5 in 12/21), hypertriglyceridemia, chronic/cleared hepatitis B infection, chronic pancreatitis here for follow up. \par \par Saw Hepatology 5/2022 for elevated LFTs, found to have elevated TG, had fibroscan showing hepatic steatosis.\par  today, repeat SBP in 120s\par Currently takes gemfibrozil 600 mg daily, no other medications, stopped lisinopril 5 mg daily 3 months ago due to improved BP\par No abdominal pain\par POCT A1C 12%\par Reports previously took metformin and glimepiride in the past but could not tolerate, felt dizzy and SOB while taking these medications. \par \par Pt reports he has been drinking special tea and putting spices such as garlic, onion, lemon pill, mint, and guayala, believes that it has been been helping with his medical issues.\par Diet:\par Breakfast: drinks special tea, coffee with milk no sugar, occasionally turkey and vegetables\par Lunch: large salad with green vegetables including broccoli, lettuce, spinach\par Dinner: chicken and salad, whole wheat bread

## 2022-09-19 DIAGNOSIS — E78.1 PURE HYPERGLYCERIDEMIA: ICD-10-CM

## 2022-09-19 DIAGNOSIS — E11.9 TYPE 2 DIABETES MELLITUS WITHOUT COMPLICATIONS: ICD-10-CM

## 2022-09-19 LAB
ALBUMIN SERPL ELPH-MCNC: 5.1 G/DL
ALP BLD-CCNC: 142 U/L
ALT SERPL-CCNC: 41 U/L
ANION GAP SERPL CALC-SCNC: 13 MMOL/L
AST SERPL-CCNC: 31 U/L
BASOPHILS # BLD AUTO: 0.04 K/UL
BASOPHILS NFR BLD AUTO: 0.7 %
BILIRUB SERPL-MCNC: 0.8 MG/DL
BUN SERPL-MCNC: 11 MG/DL
CALCIUM SERPL-MCNC: 9.9 MG/DL
CHLORIDE SERPL-SCNC: 99 MMOL/L
CHOLEST SERPL-MCNC: 244 MG/DL
CO2 SERPL-SCNC: 23 MMOL/L
CREAT SERPL-MCNC: 0.47 MG/DL
CREAT SPEC-SCNC: 30 MG/DL
EGFR: 126 ML/MIN/1.73M2
EOSINOPHIL # BLD AUTO: 0.21 K/UL
EOSINOPHIL NFR BLD AUTO: 3.8 %
GLUCOSE SERPL-MCNC: 232 MG/DL
HCT VFR BLD CALC: 45.4 %
HDLC SERPL-MCNC: 35 MG/DL
HGB BLD-MCNC: 16 G/DL
IMM GRANULOCYTES NFR BLD AUTO: 0.2 %
LDLC SERPL CALC-MCNC: NORMAL MG/DL
LYMPHOCYTES # BLD AUTO: 1.91 K/UL
LYMPHOCYTES NFR BLD AUTO: 34.1 %
MAN DIFF?: NORMAL
MCHC RBC-ENTMCNC: 31.6 PG
MCHC RBC-ENTMCNC: 35.2 GM/DL
MCV RBC AUTO: 89.5 FL
MICROALBUMIN 24H UR DL<=1MG/L-MCNC: 6.9 MG/DL
MICROALBUMIN/CREAT 24H UR-RTO: 229 MG/G
MONOCYTES # BLD AUTO: 0.63 K/UL
MONOCYTES NFR BLD AUTO: 11.3 %
NEUTROPHILS # BLD AUTO: 2.8 K/UL
NEUTROPHILS NFR BLD AUTO: 49.9 %
NONHDLC SERPL-MCNC: 210 MG/DL
PLATELET # BLD AUTO: 233 K/UL
POTASSIUM SERPL-SCNC: 4.1 MMOL/L
PROT SERPL-MCNC: 7.6 G/DL
RBC # BLD: 5.07 M/UL
RBC # FLD: 12.4 %
SODIUM SERPL-SCNC: 136 MMOL/L
TRIGL SERPL-MCNC: 583 MG/DL
WBC # FLD AUTO: 5.6 K/UL

## 2022-10-17 ENCOUNTER — OUTPATIENT (OUTPATIENT)
Dept: OUTPATIENT SERVICES | Facility: HOSPITAL | Age: 52
LOS: 1 days | End: 2022-10-17
Payer: SELF-PAY

## 2022-10-17 ENCOUNTER — LABORATORY RESULT (OUTPATIENT)
Age: 52
End: 2022-10-17

## 2022-10-17 ENCOUNTER — APPOINTMENT (OUTPATIENT)
Dept: INTERNAL MEDICINE | Facility: CLINIC | Age: 52
End: 2022-10-17

## 2022-10-17 VITALS
BODY MASS INDEX: 25.27 KG/M2 | WEIGHT: 148 LBS | SYSTOLIC BLOOD PRESSURE: 150 MMHG | HEIGHT: 64 IN | OXYGEN SATURATION: 98 % | DIASTOLIC BLOOD PRESSURE: 70 MMHG | HEART RATE: 78 BPM

## 2022-10-17 DIAGNOSIS — I10 ESSENTIAL (PRIMARY) HYPERTENSION: ICD-10-CM

## 2022-10-17 DIAGNOSIS — S91.309A UNSPECIFIED OPEN WOUND, UNSPECIFIED FOOT, INITIAL ENCOUNTER: ICD-10-CM

## 2022-10-17 PROCEDURE — G0463: CPT

## 2022-10-17 PROCEDURE — 82274 ASSAY TEST FOR BLOOD FECAL: CPT

## 2022-10-17 PROCEDURE — 82985 ASSAY OF GLYCATED PROTEIN: CPT

## 2022-10-17 PROCEDURE — ZZZZZ: CPT

## 2022-10-17 NOTE — PHYSICAL EXAM
[No Acute Distress] : no acute distress [Normal Sclera/Conjunctiva] : normal sclera/conjunctiva [EOMI] : extraocular movements intact [No Respiratory Distress] : no respiratory distress  [Normal Rate] : normal rate  [Regular Rhythm] : with a regular rhythm [Normal S1, S2] : normal S1 and S2

## 2022-10-18 LAB — FRUCTOSAMINE SERPL-MCNC: 470 UMOL/L

## 2022-10-20 PROBLEM — I10 HYPERTENSION: Status: ACTIVE | Noted: 2021-09-23

## 2022-10-20 LAB — OB PNL STL IA: NEGATIVE — SIGNIFICANT CHANGE UP

## 2022-10-20 NOTE — INTERPRETER SERVICES
[[employer] ] : provided by [employer]   [Interpreters_IDNumber] : [id number] [Interpreters_FullName] : Daniel

## 2022-10-20 NOTE — HISTORY OF PRESENT ILLNESS
[FreeTextEntry1] : DM, hypertriglyceridemia  follow-up [de-identified] : This is a 52 y/o man with PMHx of DM2 (A1C 12 in 9/22), hypertriglyceridemia, chronic/cleared hepatitis B infection, chronic pancreatitis here for DM follow up. He was restarted on metformin in September. However, he continues to take metformin 500mg BID, never having transitioned to 1000mg BID. He continues to take gemfibrozil. He states that he's improving his diet in eating more vegetables and avoiding carbohydrates. He continues to deny any desire for insulin injections.

## 2022-10-20 NOTE — PLAN
[FreeTextEntry1] : This is a 50 y/o man with PMHx of DM2 (A1C 12 in 9/22), hypertriglyceridemia, chronic/cleared hepatitis B infection, chronic pancreatitis here for DM follow up.\par \par #T2DM:\par - was taking metformin 500mg BID -- counseled on increasing to 1000mg BID (2 pills in the AM and in PM)\par - f/u fructosamine serum -- if level is >421 mmol/L (out of range for controlled DM), will consider starting SGLT2-i or GLP-inhibitors. Currently working with Dr Benavides to sign patient up for [facility] to help with dispensing these medications\par - wound on R foot from injury likely poor healing at this time due to uncontrolled DM- prescribed mupirocin ointment and counseled on using vaseline to promote wound healing at the same time \par - RTC in 3 months for A1c, urine microalbulim POC\par \par #HTN:\par - repeat /80, still elevated\par - counseled pt on side effect and utility of lisinopril 5mg - pt expressed understanding and agreement of plan\par - RTC 3 months to check for BP and whether utility in increasing dosage \par \par #HLD:\par - c/w gemfibrozil - pt refusing to try atorvastatin and fenofibrate at this time. Willing to try them after 3 month trial of exercise/diet/gemfibrozil\par - RTC 3 months for check in lipid panel\par

## 2022-10-21 ENCOUNTER — NON-APPOINTMENT (OUTPATIENT)
Age: 52
End: 2022-10-21

## 2022-10-21 DIAGNOSIS — E78.5 HYPERLIPIDEMIA, UNSPECIFIED: ICD-10-CM

## 2022-10-21 DIAGNOSIS — E11.40 TYPE 2 DIABETES MELLITUS WITH DIABETIC NEUROPATHY, UNSPECIFIED: ICD-10-CM

## 2022-10-24 ENCOUNTER — NON-APPOINTMENT (OUTPATIENT)
Age: 52
End: 2022-10-24

## 2023-02-03 ENCOUNTER — NON-APPOINTMENT (OUTPATIENT)
Age: 53
End: 2023-02-03

## 2023-02-08 ENCOUNTER — APPOINTMENT (OUTPATIENT)
Dept: INTERNAL MEDICINE | Facility: CLINIC | Age: 53
End: 2023-02-08
Payer: COMMERCIAL

## 2023-02-08 ENCOUNTER — LABORATORY RESULT (OUTPATIENT)
Age: 53
End: 2023-02-08

## 2023-02-08 ENCOUNTER — OUTPATIENT (OUTPATIENT)
Dept: OUTPATIENT SERVICES | Facility: HOSPITAL | Age: 53
LOS: 1 days | End: 2023-02-08
Payer: SELF-PAY

## 2023-02-08 VITALS
OXYGEN SATURATION: 98 % | SYSTOLIC BLOOD PRESSURE: 104 MMHG | DIASTOLIC BLOOD PRESSURE: 76 MMHG | BODY MASS INDEX: 24.07 KG/M2 | HEIGHT: 64 IN | WEIGHT: 141 LBS | HEART RATE: 84 BPM

## 2023-02-08 DIAGNOSIS — I10 ESSENTIAL (PRIMARY) HYPERTENSION: ICD-10-CM

## 2023-02-08 PROCEDURE — 80053 COMPREHEN METABOLIC PANEL: CPT

## 2023-02-08 PROCEDURE — G0463: CPT | Mod: 25

## 2023-02-08 PROCEDURE — 83721 ASSAY OF BLOOD LIPOPROTEIN: CPT

## 2023-02-08 PROCEDURE — ZZZZZ: CPT

## 2023-02-08 PROCEDURE — 80061 LIPID PANEL: CPT

## 2023-02-08 PROCEDURE — 84156 ASSAY OF PROTEIN URINE: CPT

## 2023-02-08 PROCEDURE — 84681 ASSAY OF C-PEPTIDE: CPT

## 2023-02-08 PROCEDURE — 83036 HEMOGLOBIN GLYCOSYLATED A1C: CPT

## 2023-02-08 PROCEDURE — 86341 ISLET CELL ANTIBODY: CPT

## 2023-02-09 LAB
ALBUMIN SERPL ELPH-MCNC: 4.9 G/DL
ALP BLD-CCNC: 139 U/L
ALT SERPL-CCNC: 46 U/L
ANION GAP SERPL CALC-SCNC: 16 MMOL/L
AST SERPL-CCNC: 23 U/L
BILIRUB SERPL-MCNC: 0.6 MG/DL
BUN SERPL-MCNC: 10 MG/DL
C PEPTIDE SERPL-MCNC: 2.2 NG/ML
CALCIUM SERPL-MCNC: 9.8 MG/DL
CHLORIDE SERPL-SCNC: 96 MMOL/L
CHOLEST SERPL-MCNC: 360 MG/DL
CO2 SERPL-SCNC: 21 MMOL/L
CREAT SERPL-MCNC: 0.52 MG/DL
CREAT SPEC-SCNC: 22 MG/DL
CREAT/PROT UR: 0.9 RATIO
EGFR: 121 ML/MIN/1.73M2
GLUCOSE SERPL-MCNC: 265 MG/DL
HDLC SERPL-MCNC: 29 MG/DL
LDLC SERPL CALC-MCNC: NORMAL MG/DL
NONHDLC SERPL-MCNC: 331 MG/DL
POTASSIUM SERPL-SCNC: 4.4 MMOL/L
PROT SERPL-MCNC: 7.7 G/DL
PROT UR-MCNC: 19 MG/DL
SODIUM SERPL-SCNC: 133 MMOL/L
TRIGL SERPL-MCNC: 1131 MG/DL

## 2023-02-09 RX ORDER — MUPIROCIN 20 MG/G
2 OINTMENT TOPICAL 3 TIMES DAILY
Qty: 1 | Refills: 0 | Status: DISCONTINUED | COMMUNITY
Start: 2022-10-17 | End: 2023-02-09

## 2023-02-09 RX ORDER — METFORMIN HYDROCHLORIDE 500 MG/1
500 TABLET, COATED ORAL
Qty: 180 | Refills: 2 | Status: DISCONTINUED | COMMUNITY
Start: 2021-12-06 | End: 2023-02-09

## 2023-02-09 NOTE — REVIEW OF SYSTEMS
[Fever] : no fever [Recent Change In Weight] : ~T no recent weight change [Discharge] : no discharge [Vision Problems] : no vision problems [Nasal Discharge] : no nasal discharge [Sore Throat] : no sore throat [Chest Pain] : no chest pain [Palpitations] : no palpitations [Shortness Of Breath] : no shortness of breath [Cough] : no cough [Abdominal Pain] : no abdominal pain [Vomiting] : no vomiting [Dysuria] : no dysuria [Hematuria] : no hematuria [Joint Pain] : no joint pain [Back Pain] : no back pain [Itching] : no itching [Skin Rash] : no skin rash [Headache] : no headache [Dizziness] : no dizziness

## 2023-02-09 NOTE — END OF VISIT
[] : Resident [FreeTextEntry3] : 51yo with PMHx of DM, ET who presents for follow-up of same. Adamantly refuses insulin. Would like to avoid GLP-1a/DPP4i given pancreatitis hx and comorbid uncontrolled ET. Agree with attempt to get SGLT-2i and encourage adherence to Metformin. May need vascepa if able to obtain in future.

## 2023-02-09 NOTE — PHYSICAL EXAM
[No Acute Distress] : no acute distress [Well-Appearing] : well-appearing [Normal Sclera/Conjunctiva] : normal sclera/conjunctiva [PERRL] : pupils equal round and reactive to light [Normal Outer Ear/Nose] : the outer ears and nose were normal in appearance [Normal Oropharynx] : the oropharynx was normal [No Lymphadenopathy] : no lymphadenopathy [Supple] : supple [No Respiratory Distress] : no respiratory distress  [Clear to Auscultation] : lungs were clear to auscultation bilaterally [Normal Rate] : normal rate  [Regular Rhythm] : with a regular rhythm [No Edema] : there was no peripheral edema [Soft] : abdomen soft [Non Tender] : non-tender [No CVA Tenderness] : no CVA  tenderness [No Spinal Tenderness] : no spinal tenderness [No Focal Deficits] : no focal deficits [Normal Gait] : normal gait [Normal Affect] : the affect was normal [Normal Insight/Judgement] : insight and judgment were intact [de-identified] : No noticeable wound, pt cannot remember where previous wound was

## 2023-02-09 NOTE — HISTORY OF PRESENT ILLNESS
[FreeTextEntry1] : f/u T2DM [de-identified] : 51M with PMH uncontrolled T2DM, hypertriglyceridemia/HLD, chronic/cleared hepatitis B infection, and chronic pancreatitis here for DM f/u \par \par #T2DM\par During last visit, metformin was increased to 1000mg BID but could not tolerate due to stomach irritation- so has not been taking metformin for 1 month. He called few days prior reporting noncompliance given GI side effects, recommended switch to ER formulation, which he is open to. \par Not open to insulin or other injections, wants to stick with orals and lifestyle management, states he's been exercising more and eating better. \par \par #diabetic foot wound\par Had wound during last visit, completely healed now. \par \par

## 2023-02-09 NOTE — ASSESSMENT
[FreeTextEntry1] : 51M with PMH uncontrolled T2DM, hypertriglyceridemia/HLD, previous hepatitis B infection, and chronic pancreatitis here for DM f/u \par \par #T2DM\par - POCT a1c 12.3%. Has been noncompliant w/ metformin due to stomach irritation, not checking FS's \par - still adamantly refusing insulin or other injection meds, wants to trial orals and lifestyle changes despite extensive conversation\par - switch to ER formulation which maybe better tolerated, 1000mg BID\par - [facility] attestation form filled and faxed \par - jardiance 10mg sent to [facility] \par - given thin body habitus, h/o chronic pancreatitis, will check C-peptide and T1DM abs\par - endocrine referral\par \par #diabetic foot wound\par had nonhealing wound during last visit, now resolved, cannot find on exam, pt does not remember where it was \par - routine podiatry f/u \par \par #HTN\par - c/w lisinopril 5mg\par \par #HLD/hypertriglyceridemia \par - gemfibrozil 600mg qd \par - again discussed benefits of initiating statin, adamantly refusing, wants to see repeat lipid panel \par \par RTC in 3 months for a1c check. Dr. Benavides also tasked for f/u in [facility] \par \par Case discussed w/ Dr. Nelson\par Tiffany Oliver PGY2

## 2023-02-10 DIAGNOSIS — E11.9 TYPE 2 DIABETES MELLITUS WITHOUT COMPLICATIONS: ICD-10-CM

## 2023-02-10 DIAGNOSIS — E11.40 TYPE 2 DIABETES MELLITUS WITH DIABETIC NEUROPATHY, UNSPECIFIED: ICD-10-CM

## 2023-02-10 DIAGNOSIS — E78.5 HYPERLIPIDEMIA, UNSPECIFIED: ICD-10-CM

## 2023-02-13 ENCOUNTER — NON-APPOINTMENT (OUTPATIENT)
Age: 53
End: 2023-02-13

## 2023-02-13 LAB
GAD65 AB SER-MCNC: 0.05 NMOL/L
PANC ISLET CELL AB SER QL: NORMAL

## 2023-03-21 ENCOUNTER — APPOINTMENT (OUTPATIENT)
Dept: INTERNAL MEDICINE | Facility: CLINIC | Age: 53
End: 2023-03-21
Payer: COMMERCIAL

## 2023-03-21 ENCOUNTER — NON-APPOINTMENT (OUTPATIENT)
Age: 53
End: 2023-03-21

## 2023-03-21 PROCEDURE — ZZZZZ: CPT

## 2023-06-22 ENCOUNTER — OUTPATIENT (OUTPATIENT)
Dept: OUTPATIENT SERVICES | Facility: HOSPITAL | Age: 53
LOS: 1 days | End: 2023-06-22
Payer: SELF-PAY

## 2023-06-22 ENCOUNTER — APPOINTMENT (OUTPATIENT)
Dept: INTERNAL MEDICINE | Facility: CLINIC | Age: 53
End: 2023-06-22
Payer: COMMERCIAL

## 2023-06-22 VITALS
OXYGEN SATURATION: 97 % | DIASTOLIC BLOOD PRESSURE: 76 MMHG | HEART RATE: 78 BPM | WEIGHT: 151 LBS | BODY MASS INDEX: 25.78 KG/M2 | HEIGHT: 64 IN | SYSTOLIC BLOOD PRESSURE: 122 MMHG

## 2023-06-22 DIAGNOSIS — E78.5 HYPERLIPIDEMIA, UNSPECIFIED: ICD-10-CM

## 2023-06-22 DIAGNOSIS — I10 ESSENTIAL (PRIMARY) HYPERTENSION: ICD-10-CM

## 2023-06-22 DIAGNOSIS — E78.1 PURE HYPERGLYCERIDEMIA: ICD-10-CM

## 2023-06-22 DIAGNOSIS — E11.9 TYPE 2 DIABETES MELLITUS WITHOUT COMPLICATIONS: ICD-10-CM

## 2023-06-22 LAB — HBA1C MFR BLD HPLC: 11.1

## 2023-06-22 PROCEDURE — ZZZZZ: CPT | Mod: GE

## 2023-06-22 PROCEDURE — 80053 COMPREHEN METABOLIC PANEL: CPT

## 2023-06-22 PROCEDURE — 36415 COLL VENOUS BLD VENIPUNCTURE: CPT

## 2023-06-22 PROCEDURE — 80061 LIPID PANEL: CPT

## 2023-06-22 PROCEDURE — G0463: CPT | Mod: 25

## 2023-06-22 PROCEDURE — 83036 HEMOGLOBIN GLYCOSYLATED A1C: CPT

## 2023-06-22 RX ORDER — TELMISARTAN 20 MG/1
20 TABLET ORAL
Qty: 30 | Refills: 3 | Status: DISCONTINUED | COMMUNITY
Start: 2023-02-09 | End: 2023-06-22

## 2023-06-22 RX ORDER — LISINOPRIL 5 MG/1
5 TABLET ORAL
Qty: 90 | Refills: 1 | Status: DISCONTINUED | COMMUNITY
Start: 2021-12-06 | End: 2023-06-22

## 2023-06-22 RX ORDER — EMPAGLIFLOZIN 10 MG/1
10 TABLET, FILM COATED ORAL DAILY
Qty: 60 | Refills: 3 | Status: DISCONTINUED | COMMUNITY
Start: 2023-02-08 | End: 2023-06-22

## 2023-06-22 NOTE — HEALTH RISK ASSESSMENT
[No] : No [0] : 2) Feeling down, depressed, or hopeless: Not at all (0) [Never] : Never [[id number]] : 0

## 2023-06-22 NOTE — ASSESSMENT
[FreeTextEntry1] : 52 year M with PMH DM2, hypertriglyceridemia/HLD, previous Hep B, chronic pancreatitis, HTN presenting for CPE.\par \par #uncontrolled DM2\par - BGs at home 160-240s\par - A1c today 11.1 from 12.3\par - pt amenable to starting trulicity, rx'd to [facility] and tasked Dr. Benavides to reach out to pt for teaching \par - c/w metformin \par - has not been taking lisinopril or telmisartan and wasn't sure what meds he was supposed to be taking, rx'd losartan and counselled on importance of adherence \par - counselled on healthy lifestyle changes\par - ophtho referral for blurry vision \par \par #HTN\par - BP today 122/76\par - BP acceptable but also have elevated protein/cr. Start losartan \par \par #HLD\par - c/w gemfibrozil and rosuvastatin \par - check lipid panel \par \par #head pain\par - likely muscular pain with component of neuropathy\par - pt deferring gabapentin at this time\par - can continue massages and counselled on appropriate tylenol use\par \par #Hearing loss\par - history and audiology consistent with presbycusis \par - deferring hearing aids at this time \par \par #HCM\par - negative FIT 10/2022, pt deferring repeat until 10/2023\par - shingles vax deferred, re-disucss at next visit \par - COVID vax'd x3, counselled to get bivalent vax\par \par RTC in 3 months for DM f/u\par \par Case d/w Dr. Ann \par

## 2023-06-22 NOTE — REVIEW OF SYSTEMS
[Hearing Loss] : hearing loss [Fever] : no fever [Chest Pain] : no chest pain [Shortness Of Breath] : no shortness of breath [Abdominal Pain] : no abdominal pain [Constipation] : no constipation [Diarrhea] : no diarrhea [Vomiting] : no vomiting [FreeTextEntry3] : blurry vision [FreeTextEntry4] : head pain

## 2023-06-22 NOTE — INTERPRETER SERVICES
[Patient Declined  Services] : - None: Patient declined  services [FreeTextEntry3] : Pt preferred I speak Polish\par

## 2023-06-22 NOTE — HISTORY OF PRESENT ILLNESS
[de-identified] : 52 year M with PMH DM2, hypertriglyceridemia/HLD, previous Hep B, chronic pancreatitis, HTN presenting for CPE. \par \par Reports taking metformin as rx'd daily but doesn't take his BP meds (unsure of names).\par BGs at home 160-240s.  \par \par Reports L-sided intermittent head pain that started 1 year ago. Reports occurs at night and when moving his head. Massages relieve the pain. \par \par Also reporting trouble hearing for several years. Unable to localize hearing deficit to either ear. Reports having done audiology testing that was normal.

## 2023-06-23 ENCOUNTER — NON-APPOINTMENT (OUTPATIENT)
Age: 53
End: 2023-06-23

## 2023-06-23 DIAGNOSIS — E78.5 HYPERLIPIDEMIA, UNSPECIFIED: ICD-10-CM

## 2023-06-23 DIAGNOSIS — E78.1 PURE HYPERGLYCERIDEMIA: ICD-10-CM

## 2023-06-23 DIAGNOSIS — E11.40 TYPE 2 DIABETES MELLITUS WITH DIABETIC NEUROPATHY, UNSPECIFIED: ICD-10-CM

## 2023-06-23 LAB
ALBUMIN SERPL ELPH-MCNC: 5 G/DL
ALP BLD-CCNC: 132 U/L
ALT SERPL-CCNC: 56 U/L
ANION GAP SERPL CALC-SCNC: 12 MMOL/L
AST SERPL-CCNC: 42 U/L
BILIRUB SERPL-MCNC: 0.4 MG/DL
BUN SERPL-MCNC: 12 MG/DL
CALCIUM SERPL-MCNC: 10.2 MG/DL
CHLORIDE SERPL-SCNC: 99 MMOL/L
CHOLEST SERPL-MCNC: 224 MG/DL
CO2 SERPL-SCNC: 27 MMOL/L
CREAT SERPL-MCNC: 0.6 MG/DL
EGFR: 116 ML/MIN/1.73M2
GLUCOSE SERPL-MCNC: 197 MG/DL
HDLC SERPL-MCNC: 30 MG/DL
LDLC SERPL CALC-MCNC: NORMAL MG/DL
NONHDLC SERPL-MCNC: 194 MG/DL
POTASSIUM SERPL-SCNC: 4.7 MMOL/L
PROT SERPL-MCNC: 7.8 G/DL
SODIUM SERPL-SCNC: 137 MMOL/L
TRIGL SERPL-MCNC: 625 MG/DL

## 2023-06-29 ENCOUNTER — APPOINTMENT (OUTPATIENT)
Dept: INTERNAL MEDICINE | Facility: CLINIC | Age: 53
End: 2023-06-29

## 2023-08-17 ENCOUNTER — APPOINTMENT (OUTPATIENT)
Dept: ENDOCRINOLOGY | Facility: HOSPITAL | Age: 53
End: 2023-08-17

## 2023-08-31 ENCOUNTER — OUTPATIENT (OUTPATIENT)
Dept: OUTPATIENT SERVICES | Facility: HOSPITAL | Age: 53
LOS: 1 days | End: 2023-08-31

## 2023-08-31 ENCOUNTER — APPOINTMENT (OUTPATIENT)
Dept: INTERNAL MEDICINE | Facility: CLINIC | Age: 53
End: 2023-08-31
Payer: COMMERCIAL

## 2023-08-31 VITALS
DIASTOLIC BLOOD PRESSURE: 72 MMHG | HEIGHT: 64 IN | BODY MASS INDEX: 25.1 KG/M2 | WEIGHT: 147 LBS | SYSTOLIC BLOOD PRESSURE: 112 MMHG | OXYGEN SATURATION: 97 % | HEART RATE: 79 BPM

## 2023-08-31 DIAGNOSIS — E78.1 PURE HYPERGLYCERIDEMIA: ICD-10-CM

## 2023-08-31 DIAGNOSIS — I10 ESSENTIAL (PRIMARY) HYPERTENSION: ICD-10-CM

## 2023-08-31 DIAGNOSIS — E11.40 TYPE 2 DIABETES MELLITUS WITH DIABETIC NEUROPATHY, UNSPECIFIED: ICD-10-CM

## 2023-08-31 DIAGNOSIS — E11.42 TYPE 2 DIABETES MELLITUS WITH DIABETIC POLYNEUROPATHY: ICD-10-CM

## 2023-08-31 DIAGNOSIS — E78.5 HYPERLIPIDEMIA, UNSPECIFIED: ICD-10-CM

## 2023-08-31 LAB — HBA1C MFR BLD HPLC: 8.9

## 2023-08-31 PROCEDURE — 99213 OFFICE O/P EST LOW 20 MIN: CPT | Mod: GE,25

## 2023-08-31 RX ORDER — DULAGLUTIDE 0.75 MG/.5ML
0.75 INJECTION, SOLUTION SUBCUTANEOUS
Qty: 1 | Refills: 0 | Status: DISCONTINUED | COMMUNITY
Start: 2023-06-22 | End: 2023-08-31

## 2023-09-07 NOTE — ASSESSMENT
[FreeTextEntry1] : 52 year M with history DM2, hypertriglyceridemia/HLD, previous Hep B, chronic pancreatitis, HTN presenting for f/u  #DM2, uncontrolled - A1c 8.9 from 11.1 - Home BGs: highest 260s, lowest 100s - has not been taking trulicity because he said he never received the medication, would like to hold off on starting since A1c going down - c/w metformin and low carb diet   #HLD/hyperTG - c/w gemfibrozil - renewed statin   #peripheral neuropathy  - reporting b/l toe tingling likely 2/2 uncontrolled DM - would like to hold off on starting gabapentin   Case d/w Dr. Ann   RTC in 3 mo for DM f/u

## 2023-09-07 NOTE — REVIEW OF SYSTEMS
[Fever] : no fever [Chest Pain] : no chest pain [Shortness Of Breath] : no shortness of breath [Abdominal Pain] : no abdominal pain [Diarrhea] : no diarrhea [Vomiting] : no vomiting

## 2023-09-07 NOTE — HISTORY OF PRESENT ILLNESS
[de-identified] : 52 year M with history DM2, hypertriglyceridemia/HLD, previous Hep B, chronic pancreatitis, HTN presenting for f/u  Home BGs: highest 260s, lowest 100s Reports adhering to low carb diet Also reporting tingling in b/l feet

## 2023-12-20 DIAGNOSIS — H57.89 OTHER SPECIFIED DISORDERS OF EYE AND ADNEXA: ICD-10-CM

## 2023-12-21 ENCOUNTER — APPOINTMENT (OUTPATIENT)
Dept: OPHTHALMOLOGY | Facility: CLINIC | Age: 53
End: 2023-12-21
Payer: COMMERCIAL

## 2023-12-21 ENCOUNTER — NON-APPOINTMENT (OUTPATIENT)
Age: 53
End: 2023-12-21

## 2023-12-21 PROCEDURE — 92012 INTRM OPH EXAM EST PATIENT: CPT

## 2023-12-27 ENCOUNTER — APPOINTMENT (OUTPATIENT)
Dept: OPHTHALMOLOGY | Facility: CLINIC | Age: 53
End: 2023-12-27

## 2024-03-08 ENCOUNTER — LABORATORY RESULT (OUTPATIENT)
Age: 54
End: 2024-03-08

## 2024-03-08 ENCOUNTER — APPOINTMENT (OUTPATIENT)
Dept: INTERNAL MEDICINE | Facility: CLINIC | Age: 54
End: 2024-03-08

## 2024-03-08 ENCOUNTER — APPOINTMENT (OUTPATIENT)
Dept: INTERNAL MEDICINE | Facility: CLINIC | Age: 54
End: 2024-03-08
Payer: COMMERCIAL

## 2024-03-08 ENCOUNTER — OUTPATIENT (OUTPATIENT)
Dept: OUTPATIENT SERVICES | Facility: HOSPITAL | Age: 54
LOS: 1 days | End: 2024-03-08
Payer: SELF-PAY

## 2024-03-08 VITALS
WEIGHT: 147 LBS | DIASTOLIC BLOOD PRESSURE: 80 MMHG | SYSTOLIC BLOOD PRESSURE: 136 MMHG | HEART RATE: 79 BPM | BODY MASS INDEX: 25.1 KG/M2 | OXYGEN SATURATION: 98 % | HEIGHT: 64 IN

## 2024-03-08 DIAGNOSIS — J30.9 ALLERGIC RHINITIS, UNSPECIFIED: ICD-10-CM

## 2024-03-08 DIAGNOSIS — I10 ESSENTIAL (PRIMARY) HYPERTENSION: ICD-10-CM

## 2024-03-08 PROCEDURE — 80061 LIPID PANEL: CPT

## 2024-03-08 PROCEDURE — G0463: CPT

## 2024-03-08 PROCEDURE — 99213 OFFICE O/P EST LOW 20 MIN: CPT | Mod: GE

## 2024-03-08 PROCEDURE — 83036 HEMOGLOBIN GLYCOSYLATED A1C: CPT

## 2024-03-08 PROCEDURE — 80053 COMPREHEN METABOLIC PANEL: CPT

## 2024-03-08 PROCEDURE — 83721 ASSAY OF BLOOD LIPOPROTEIN: CPT

## 2024-03-08 NOTE — REVIEW OF SYSTEMS
[Fever] : fever [Sore Throat] : sore throat [Negative] : Gastrointestinal [FreeTextEntry4] : resolved [FreeTextEntry2] : resolved

## 2024-03-08 NOTE — HISTORY OF PRESENT ILLNESS
[FreeTextEntry8] : 53M PMD DM, HLD, HTN, presents w/ acute complaint of resolved sore throat.  Reported that he had sore throat 3wks ago now feels better. Has "white" color on the R side which is not pus. Has L erythema. 3wks ago pt had fever, cough, throat pain and cannot swallow. Now resolved.  In addition he wants his sugar and cholesterol to be checked.

## 2024-03-08 NOTE — PHYSICAL EXAM
[de-identified] : minimal erythema, R tonsil has sydnee color collection that appears to be a mucoid cyst vs a stone

## 2024-03-11 DIAGNOSIS — J30.9 ALLERGIC RHINITIS, UNSPECIFIED: ICD-10-CM

## 2024-03-11 DIAGNOSIS — E11.40 TYPE 2 DIABETES MELLITUS WITH DIABETIC NEUROPATHY, UNSPECIFIED: ICD-10-CM

## 2024-03-21 LAB
ALBUMIN SERPL ELPH-MCNC: 4.4 G/DL
ALP BLD-CCNC: 145 U/L
ALT SERPL-CCNC: 31 U/L
ANION GAP SERPL CALC-SCNC: 11 MMOL/L
AST SERPL-CCNC: 21 U/L
BILIRUB SERPL-MCNC: 0.4 MG/DL
BUN SERPL-MCNC: 10 MG/DL
CALCIUM SERPL-MCNC: 9.6 MG/DL
CHLORIDE SERPL-SCNC: 98 MMOL/L
CO2 SERPL-SCNC: 24 MMOL/L
CREAT SERPL-MCNC: 0.61 MG/DL
EGFR: 115 ML/MIN/1.73M2
GLUCOSE SERPL-MCNC: 255 MG/DL
POTASSIUM SERPL-SCNC: 4.8 MMOL/L
PROT SERPL-MCNC: 7.9 G/DL
SODIUM SERPL-SCNC: 133 MMOL/L

## 2024-03-22 ENCOUNTER — NON-APPOINTMENT (OUTPATIENT)
Age: 54
End: 2024-03-22

## 2024-03-22 LAB
CHOLEST SERPL-MCNC: 225 MG/DL
ESTIMATED AVERAGE GLUCOSE: 298 MG/DL
HBA1C MFR BLD HPLC: 12 %
HDLC SERPL-MCNC: 37 MG/DL
LDLC SERPL CALC-MCNC: 103 MG/DL
NONHDLC SERPL-MCNC: 188 MG/DL
TRIGL SERPL-MCNC: 502 MG/DL

## 2024-04-03 RX ORDER — EMPAGLIFLOZIN 10 MG/1
10 TABLET, FILM COATED ORAL DAILY
Refills: 0 | Status: ACTIVE | COMMUNITY
Start: 2024-04-03

## 2024-05-20 ENCOUNTER — NON-APPOINTMENT (OUTPATIENT)
Age: 54
End: 2024-05-20

## 2024-05-20 RX ORDER — EMPAGLIFLOZIN 10 MG/1
10 TABLET, FILM COATED ORAL DAILY
Qty: 90 | Refills: 3 | Status: ACTIVE | COMMUNITY
Start: 2024-05-20 | End: 1900-01-01

## 2024-05-29 ENCOUNTER — NON-APPOINTMENT (OUTPATIENT)
Age: 54
End: 2024-05-29

## 2024-05-29 ENCOUNTER — OUTPATIENT (OUTPATIENT)
Dept: OUTPATIENT SERVICES | Facility: HOSPITAL | Age: 54
LOS: 1 days | End: 2024-05-29
Payer: SELF-PAY

## 2024-05-29 ENCOUNTER — APPOINTMENT (OUTPATIENT)
Dept: INTERNAL MEDICINE | Facility: CLINIC | Age: 54
End: 2024-05-29
Payer: COMMERCIAL

## 2024-05-29 VITALS
BODY MASS INDEX: 25.1 KG/M2 | HEART RATE: 83 BPM | HEIGHT: 64 IN | SYSTOLIC BLOOD PRESSURE: 138 MMHG | OXYGEN SATURATION: 96 % | WEIGHT: 147 LBS | DIASTOLIC BLOOD PRESSURE: 90 MMHG

## 2024-05-29 DIAGNOSIS — E11.40 TYPE 2 DIABETES MELLITUS WITH DIABETIC NEUROPATHY, UNSPECIFIED: ICD-10-CM

## 2024-05-29 DIAGNOSIS — I10 ESSENTIAL (PRIMARY) HYPERTENSION: ICD-10-CM

## 2024-05-29 DIAGNOSIS — I20.9 ANGINA PECTORIS, UNSPECIFIED: ICD-10-CM

## 2024-05-29 DIAGNOSIS — Z00.00 ENCOUNTER FOR GENERAL ADULT MEDICAL EXAMINATION W/OUT ABNORMAL FINDINGS: ICD-10-CM

## 2024-05-29 DIAGNOSIS — E78.1 PURE HYPERGLYCERIDEMIA: ICD-10-CM

## 2024-05-29 DIAGNOSIS — R73.01 IMPAIRED FASTING GLUCOSE: ICD-10-CM

## 2024-05-29 DIAGNOSIS — E11.9 TYPE 2 DIABETES MELLITUS W/OUT COMPLICATIONS: ICD-10-CM

## 2024-05-29 LAB — HBA1C MFR BLD HPLC: 9.3

## 2024-05-29 PROCEDURE — 83036 HEMOGLOBIN GLYCOSYLATED A1C: CPT

## 2024-05-29 PROCEDURE — 82043 UR ALBUMIN QUANTITATIVE: CPT

## 2024-05-29 PROCEDURE — 80061 LIPID PANEL: CPT

## 2024-05-29 PROCEDURE — T1013: CPT

## 2024-05-29 PROCEDURE — G0463: CPT

## 2024-05-29 PROCEDURE — 99396 PREV VISIT EST AGE 40-64: CPT | Mod: GC

## 2024-05-29 PROCEDURE — 85027 COMPLETE CBC AUTOMATED: CPT

## 2024-05-29 PROCEDURE — 80053 COMPREHEN METABOLIC PANEL: CPT

## 2024-05-29 RX ORDER — FENOFIBRATE 48 MG/1
48 TABLET ORAL DAILY
Qty: 90 | Refills: 2 | Status: ACTIVE | COMMUNITY
Start: 2024-05-29 | End: 1900-01-01

## 2024-05-29 RX ORDER — ROSUVASTATIN CALCIUM 40 MG/1
40 TABLET, FILM COATED ORAL DAILY
Qty: 90 | Refills: 3 | Status: ACTIVE | COMMUNITY
Start: 2023-02-09 | End: 1900-01-01

## 2024-05-29 RX ORDER — LOSARTAN POTASSIUM 25 MG/1
25 TABLET, FILM COATED ORAL DAILY
Qty: 1 | Refills: 1 | Status: ACTIVE | COMMUNITY
Start: 2023-06-22 | End: 1900-01-01

## 2024-05-29 RX ORDER — METFORMIN HYDROCHLORIDE 500 MG/1
500 TABLET, FILM COATED, EXTENDED RELEASE ORAL
Qty: 360 | Refills: 2 | Status: DISCONTINUED | COMMUNITY
Start: 2023-02-08 | End: 2024-05-29

## 2024-05-29 RX ORDER — METFORMIN HYDROCHLORIDE 500 MG/1
500 TABLET, COATED ORAL TWICE DAILY
Qty: 90 | Refills: 2 | Status: ACTIVE | COMMUNITY
Start: 2024-05-29 | End: 1900-01-01

## 2024-05-29 RX ORDER — GEMFIBROZIL 600 MG/1
600 TABLET, FILM COATED ORAL TWICE DAILY
Qty: 180 | Refills: 1 | Status: DISCONTINUED | COMMUNITY
Start: 2021-12-09 | End: 2024-05-29

## 2024-05-29 NOTE — PHYSICAL EXAM
[No Acute Distress] : no acute distress [Well Nourished] : well nourished [Well Developed] : well developed [Well-Appearing] : well-appearing [Normal Sclera/Conjunctiva] : normal sclera/conjunctiva [PERRL] : pupils equal round and reactive to light [Normal Outer Ear/Nose] : the outer ears and nose were normal in appearance [Normal Oropharynx] : the oropharynx was normal [No JVD] : no jugular venous distention [No Lymphadenopathy] : no lymphadenopathy [Supple] : supple [No Respiratory Distress] : no respiratory distress  [No Accessory Muscle Use] : no accessory muscle use [Clear to Auscultation] : lungs were clear to auscultation bilaterally [Normal Rate] : normal rate  [Regular Rhythm] : with a regular rhythm [Normal S1, S2] : normal S1 and S2 [Pedal Pulses Present] : the pedal pulses are present [No Edema] : there was no peripheral edema [Soft] : abdomen soft [Non Tender] : non-tender [Non-distended] : non-distended [Normal Posterior Cervical Nodes] : no posterior cervical lymphadenopathy [Normal Anterior Cervical Nodes] : no anterior cervical lymphadenopathy [No CVA Tenderness] : no CVA  tenderness [No Spinal Tenderness] : no spinal tenderness [No Joint Swelling] : no joint swelling [Grossly Normal Strength/Tone] : grossly normal strength/tone [No Rash] : no rash [Coordination Grossly Intact] : coordination grossly intact [No Focal Deficits] : no focal deficits [Normal Affect] : the affect was normal [Normal Insight/Judgement] : insight and judgment were intact

## 2024-05-29 NOTE — INTERPRETER SERVICES
[Time Spent: ____ minutes] : Total time spent using  services: [unfilled] minutes. The patient's primary language is not English thus required  services. [Interpreters_IDNumber] : [id number] [Interpreters_FullName] : Neida [TWNoteComboBox1] : Beninese

## 2024-05-29 NOTE — HISTORY OF PRESENT ILLNESS
[FreeTextEntry1] : CPE [de-identified] : Patient is a 54 y/o male with PMHx of DM2, hypertriglyceridemia/HLD, previous Hep B, chronic pancreatitis, HTN, presenting for CPE. Patient reports that he feels well for the most part, but 2 months ago he experienced a substernal chest pain, described as "soft", occurring when he was working. The pain subsided with resting. The pain did not recur, and it was the first time he had it.  In terms of his diabetes, he has been keeping a log of FS, usually waking up >170s premeal, with slow decline throughout the day in the 110-130s. He has been managing his diet closely, including lots of vegetables, cutting out carbs nearly completely. He also engages in yoga and meditation. He has not been taking Metformin as usual because he started getting charged for it. He also has not been taking gemfibrozil because he was getting charged. He also has not received Jardiance from [facility].

## 2024-05-29 NOTE — ASSESSMENT
[FreeTextEntry1] : #Stable angina - Exertional substernal chest pain occurred 2 months ago at work, no recurrence - Has never had cardiac workup - EKG WNL - Refer for stress test given high ASCVD, risk factors  #DM2 - c/w Metformin 500 BID sent to [facility] - c/w Jardiance 10mg qD via [facility]  #HTN - c/w losartan 25 qD sent to [facility]  #HLD - Switched gemfibrozil to fenofibrate for [facility] coverage -  - c/w rosuvastatin 40mg qD  #HCM -Colon cancer screening: Referral to GI given -HIV/HCV: f/u labs -Vaccinations      TDAP: Due but patient deferred today, will give at follow up      Shingles: Patient due but deferred

## 2024-05-31 LAB
ALBUMIN SERPL ELPH-MCNC: 5 G/DL
ALP BLD-CCNC: 136 U/L
ALT SERPL-CCNC: 43 U/L
ANION GAP SERPL CALC-SCNC: 14 MMOL/L
AST SERPL-CCNC: 25 U/L
BILIRUB SERPL-MCNC: 0.7 MG/DL
BUN SERPL-MCNC: 11 MG/DL
CALCIUM SERPL-MCNC: 9.8 MG/DL
CHLORIDE SERPL-SCNC: 102 MMOL/L
CHOLEST SERPL-MCNC: 132 MG/DL
CO2 SERPL-SCNC: 24 MMOL/L
CREAT SERPL-MCNC: 0.61 MG/DL
CREAT SPEC-SCNC: 27 MG/DL
EGFR: 115 ML/MIN/1.73M2
GLUCOSE SERPL-MCNC: 196 MG/DL
HCT VFR BLD CALC: 47.2 %
HDLC SERPL-MCNC: 31 MG/DL
HGB BLD-MCNC: 15.4 G/DL
LDLC SERPL CALC-MCNC: 53 MG/DL
MCHC RBC-ENTMCNC: 30 PG
MCHC RBC-ENTMCNC: 32.6 GM/DL
MCV RBC AUTO: 92 FL
MICROALBUMIN 24H UR DL<=1MG/L-MCNC: 3.6 MG/DL
MICROALBUMIN/CREAT 24H UR-RTO: 134 MG/G
NONHDLC SERPL-MCNC: 101 MG/DL
PLATELET # BLD AUTO: 251 K/UL
POTASSIUM SERPL-SCNC: 5.2 MMOL/L
PROT SERPL-MCNC: 8 G/DL
RBC # BLD: 5.13 M/UL
RBC # FLD: 12.9 %
SODIUM SERPL-SCNC: 140 MMOL/L
TRIGL SERPL-MCNC: 316 MG/DL
WBC # FLD AUTO: 6.28 K/UL

## 2024-06-04 DIAGNOSIS — Z00.00 ENCOUNTER FOR GENERAL ADULT MEDICAL EXAMINATION WITHOUT ABNORMAL FINDINGS: ICD-10-CM

## 2024-06-04 DIAGNOSIS — E11.9 TYPE 2 DIABETES MELLITUS WITHOUT COMPLICATIONS: ICD-10-CM

## 2024-06-04 DIAGNOSIS — E11.40 TYPE 2 DIABETES MELLITUS WITH DIABETIC NEUROPATHY, UNSPECIFIED: ICD-10-CM

## 2024-06-04 DIAGNOSIS — E78.1 PURE HYPERGLYCERIDEMIA: ICD-10-CM

## 2024-06-04 DIAGNOSIS — I20.9 ANGINA PECTORIS, UNSPECIFIED: ICD-10-CM

## 2024-06-05 ENCOUNTER — RESULT REVIEW (OUTPATIENT)
Age: 54
End: 2024-06-05

## 2024-06-06 ENCOUNTER — OUTPATIENT (OUTPATIENT)
Dept: OUTPATIENT SERVICES | Facility: HOSPITAL | Age: 54
LOS: 1 days | End: 2024-06-06
Payer: SELF-PAY

## 2024-06-06 ENCOUNTER — APPOINTMENT (OUTPATIENT)
Dept: CV DIAGNOSTICS | Facility: HOSPITAL | Age: 54
End: 2024-06-06

## 2024-06-06 DIAGNOSIS — I20.9 ANGINA PECTORIS, UNSPECIFIED: ICD-10-CM

## 2024-06-06 DIAGNOSIS — I10 ESSENTIAL (PRIMARY) HYPERTENSION: ICD-10-CM

## 2024-06-06 DIAGNOSIS — E11.40 TYPE 2 DIABETES MELLITUS WITH DIABETIC NEUROPATHY, UNSPECIFIED: ICD-10-CM

## 2024-06-06 DIAGNOSIS — E78.1 PURE HYPERGLYCERIDEMIA: ICD-10-CM

## 2024-06-06 DIAGNOSIS — Z00.00 ENCOUNTER FOR GENERAL ADULT MEDICAL EXAMINATION WITHOUT ABNORMAL FINDINGS: ICD-10-CM

## 2024-06-06 DIAGNOSIS — E11.9 TYPE 2 DIABETES MELLITUS WITHOUT COMPLICATIONS: ICD-10-CM

## 2024-06-06 PROCEDURE — 93017 CV STRESS TEST TRACING ONLY: CPT

## 2024-06-06 PROCEDURE — 93016 CV STRESS TEST SUPVJ ONLY: CPT

## 2024-06-06 PROCEDURE — 93018 CV STRESS TEST I&R ONLY: CPT

## 2024-06-10 ENCOUNTER — NON-APPOINTMENT (OUTPATIENT)
Age: 54
End: 2024-06-10

## 2024-06-10 DIAGNOSIS — R94.39 ABNORMAL RESULT OF OTHER CARDIOVASCULAR FUNCTION STUDY: ICD-10-CM

## 2024-06-25 ENCOUNTER — OUTPATIENT (OUTPATIENT)
Dept: OUTPATIENT SERVICES | Facility: HOSPITAL | Age: 54
LOS: 1 days | End: 2024-06-25
Payer: SELF-PAY

## 2024-06-25 ENCOUNTER — APPOINTMENT (OUTPATIENT)
Dept: CARDIOLOGY | Facility: HOSPITAL | Age: 54
End: 2024-06-25

## 2024-06-25 VITALS
BODY MASS INDEX: 25.27 KG/M2 | DIASTOLIC BLOOD PRESSURE: 88 MMHG | HEART RATE: 71 BPM | OXYGEN SATURATION: 98 % | WEIGHT: 148 LBS | SYSTOLIC BLOOD PRESSURE: 154 MMHG | HEIGHT: 64 IN

## 2024-06-25 DIAGNOSIS — I25.10 ATHEROSCLEROTIC HEART DISEASE OF NATIVE CORONARY ARTERY WITHOUT ANGINA PECTORIS: ICD-10-CM

## 2024-06-25 PROCEDURE — 93005 ELECTROCARDIOGRAM TRACING: CPT

## 2024-06-25 PROCEDURE — G0463: CPT

## 2024-06-26 DIAGNOSIS — R07.9 CHEST PAIN, UNSPECIFIED: ICD-10-CM

## 2024-07-23 ENCOUNTER — APPOINTMENT (OUTPATIENT)
Dept: INTERNAL MEDICINE | Facility: CLINIC | Age: 54
End: 2024-07-23
Payer: COMMERCIAL

## 2024-07-23 VITALS
OXYGEN SATURATION: 97 % | WEIGHT: 146 LBS | BODY MASS INDEX: 24.92 KG/M2 | SYSTOLIC BLOOD PRESSURE: 118 MMHG | HEIGHT: 64 IN | DIASTOLIC BLOOD PRESSURE: 80 MMHG | HEART RATE: 80 BPM

## 2024-07-23 DIAGNOSIS — R94.39 ABNORMAL RESULT OF OTHER CARDIOVASCULAR FUNCTION STUDY: ICD-10-CM

## 2024-07-23 DIAGNOSIS — E78.5 HYPERLIPIDEMIA, UNSPECIFIED: ICD-10-CM

## 2024-07-23 DIAGNOSIS — R43.0 ANOSMIA: ICD-10-CM

## 2024-07-23 DIAGNOSIS — E11.9 TYPE 2 DIABETES MELLITUS W/OUT COMPLICATIONS: ICD-10-CM

## 2024-07-23 PROCEDURE — 99213 OFFICE O/P EST LOW 20 MIN: CPT | Mod: GE

## 2024-07-23 NOTE — PHYSICAL EXAM
[No Acute Distress] : no acute distress [Well Nourished] : well nourished [Well Developed] : well developed [Well-Appearing] : well-appearing [Normal Sclera/Conjunctiva] : normal sclera/conjunctiva [PERRL] : pupils equal round and reactive to light [EOMI] : extraocular movements intact [Normal Outer Ear/Nose] : the outer ears and nose were normal in appearance [Normal Oropharynx] : the oropharynx was normal [No JVD] : no jugular venous distention [No Lymphadenopathy] : no lymphadenopathy [Supple] : supple [Thyroid Normal, No Nodules] : the thyroid was normal and there were no nodules present [No Respiratory Distress] : no respiratory distress  [No Accessory Muscle Use] : no accessory muscle use [Clear to Auscultation] : lungs were clear to auscultation bilaterally [Normal Rate] : normal rate  [Regular Rhythm] : with a regular rhythm [Normal S1, S2] : normal S1 and S2 [No Murmur] : no murmur heard [No Edema] : there was no peripheral edema [Soft] : abdomen soft [Non Tender] : non-tender [Non-distended] : non-distended [No Masses] : no abdominal mass palpated [No HSM] : no HSM [Normal Bowel Sounds] : normal bowel sounds [No CVA Tenderness] : no CVA  tenderness [No Spinal Tenderness] : no spinal tenderness [No Joint Swelling] : no joint swelling [Grossly Normal Strength/Tone] : grossly normal strength/tone [No Rash] : no rash [Coordination Grossly Intact] : coordination grossly intact [No Focal Deficits] : no focal deficits [Deep Tendon Reflexes (DTR)] : deep tendon reflexes were 2+ and symmetric [Normal Affect] : the affect was normal [Normal Insight/Judgement] : insight and judgment were intact

## 2024-07-24 ENCOUNTER — NON-APPOINTMENT (OUTPATIENT)
Age: 54
End: 2024-07-24

## 2024-07-24 LAB
CHOLEST SERPL-MCNC: 128 MG/DL
ESTIMATED AVERAGE GLUCOSE: 189 MG/DL
HBA1C MFR BLD HPLC: 8.2 %
HDLC SERPL-MCNC: 39 MG/DL
LDLC SERPL CALC-MCNC: 58 MG/DL
NONHDLC SERPL-MCNC: 89 MG/DL
TRIGL SERPL-MCNC: 188 MG/DL

## 2024-07-24 NOTE — HISTORY OF PRESENT ILLNESS
[FreeTextEntry1] : f/u re: DM  [de-identified] : 53M PMH DM2, HLD, prev Hep B, chronic pancreatitis, HTN p/f f/u re: DM. Pt states that he has been compliant w/ his medications of metformin 500 BID and jardiance 10. States that he checks his blood sugars and generally ranges around 140s in the AM. Pt endorses making changes to diet including cutting out red meat, eating more vegetables and cutting down on carbohydrates and sweets. Pt states that he exercises more frequently as well.   Pt also notes anosmia for past several years. He states that the symptom started after he had some trauma to his nose during work. Denies any congestive symptoms, allergies. States he had COVID in 2020, but symptoms of anosmia preceded COVID infection. No abnormal weight loss.   Pt denies any further episodes of chest pain w/ exertion for past month. Will go for CT coronary and TTE.

## 2024-07-24 NOTE — INTERPRETER SERVICES
[[employer] ] : provided by [employer]   [Interpreters_IDNumber] : [id number] [TWNoteComboBox1] : Bruneian

## 2024-07-24 NOTE — HISTORY OF PRESENT ILLNESS
[FreeTextEntry1] : f/u re: DM  [de-identified] : 53M PMH DM2, HLD, prev Hep B, chronic pancreatitis, HTN p/f f/u re: DM. Pt states that he has been compliant w/ his medications of metformin 500 BID and jardiance 10. States that he checks his blood sugars and generally ranges around 140s in the AM. Pt endorses making changes to diet including cutting out red meat, eating more vegetables and cutting down on carbohydrates and sweets. Pt states that he exercises more frequently as well.   Pt also notes anosmia for past several years. He states that the symptom started after he had some trauma to his nose during work. Denies any congestive symptoms, allergies. States he had COVID in 2020, but symptoms of anosmia preceded COVID infection. No abnormal weight loss.   Pt denies any further episodes of chest pain w/ exertion for past month. Will go for CT coronary and TTE.

## 2024-07-24 NOTE — INTERPRETER SERVICES
[[employer] ] : provided by [employer]   [Interpreters_IDNumber] : [id number] [TWNoteComboBox1] : [place]

## 2024-07-26 ENCOUNTER — APPOINTMENT (OUTPATIENT)
Dept: CT IMAGING | Facility: CLINIC | Age: 54
End: 2024-07-26
Payer: COMMERCIAL

## 2024-07-26 PROCEDURE — 75574 CT ANGIO HRT W/3D IMAGE: CPT

## 2024-08-06 ENCOUNTER — APPOINTMENT (OUTPATIENT)
Dept: CARDIOLOGY | Facility: HOSPITAL | Age: 54
End: 2024-08-06

## 2024-08-07 ENCOUNTER — APPOINTMENT (OUTPATIENT)
Dept: INTERNAL MEDICINE | Facility: CLINIC | Age: 54
End: 2024-08-07

## 2024-08-07 PROBLEM — M54.50 LOWER BACK PAIN: Status: ACTIVE | Noted: 2024-08-07

## 2024-08-07 PROCEDURE — 99443: CPT

## 2024-08-09 ENCOUNTER — NON-APPOINTMENT (OUTPATIENT)
Age: 54
End: 2024-08-09

## 2024-08-09 ENCOUNTER — OUTPATIENT (OUTPATIENT)
Dept: OUTPATIENT SERVICES | Facility: HOSPITAL | Age: 54
LOS: 1 days | End: 2024-08-09
Payer: SELF-PAY

## 2024-08-09 ENCOUNTER — APPOINTMENT (OUTPATIENT)
Dept: INTERNAL MEDICINE | Facility: CLINIC | Age: 54
End: 2024-08-09

## 2024-08-09 DIAGNOSIS — I10 ESSENTIAL (PRIMARY) HYPERTENSION: ICD-10-CM

## 2024-08-09 PROBLEM — I25.5 ISCHEMIC MYOCARDIAL DYSFUNCTION: Status: ACTIVE | Noted: 2024-08-09

## 2024-08-09 PROBLEM — M54.9 ACUTE BACK PAIN, UNSPECIFIED BACK LOCATION, UNSPECIFIED BACK PAIN LATERALITY: Status: ACTIVE | Noted: 2024-08-09

## 2024-08-09 PROCEDURE — G0463: CPT

## 2024-08-09 PROCEDURE — 83880 ASSAY OF NATRIURETIC PEPTIDE: CPT

## 2024-08-09 PROCEDURE — 80053 COMPREHEN METABOLIC PANEL: CPT

## 2024-08-09 PROCEDURE — 84484 ASSAY OF TROPONIN QUANT: CPT

## 2024-08-09 PROCEDURE — 99214 OFFICE O/P EST MOD 30 MIN: CPT | Mod: GC

## 2024-08-09 PROCEDURE — 81001 URINALYSIS AUTO W/SCOPE: CPT

## 2024-08-09 NOTE — PHYSICAL EXAM
[No Acute Distress] : no acute distress [Normal] : no acute distress, well nourished, well developed and well-appearing [Normal Sclera/Conjunctiva] : normal sclera/conjunctiva [PERRL] : pupils equal round and reactive to light [EOMI] : extraocular movements intact [Normal Outer Ear/Nose] : the outer ears and nose were normal in appearance [Normal Oropharynx] : the oropharynx was normal [Normal TMs] : both tympanic membranes were normal [Supple] : supple [No Respiratory Distress] : no respiratory distress  [No Accessory Muscle Use] : no accessory muscle use [Clear to Auscultation] : lungs were clear to auscultation bilaterally [Normal Rate] : normal rate  [Regular Rhythm] : with a regular rhythm [Normal S1, S2] : normal S1 and S2 [No Carotid Bruits] : no carotid bruits [No Abdominal Bruit] : a ~M bruit was not heard ~T in the abdomen [No Edema] : there was no peripheral edema [No Extremity Clubbing/Cyanosis] : no extremity clubbing/cyanosis [Soft] : abdomen soft [Non Tender] : non-tender [Non-distended] : non-distended [Normal Bowel Sounds] : normal bowel sounds [No Joint Swelling] : no joint swelling [Grossly Normal Strength/Tone] : grossly normal strength/tone [No Rash] : no rash [Coordination Grossly Intact] : coordination grossly intact [No Focal Deficits] : no focal deficits

## 2024-08-11 NOTE — HISTORY OF PRESENT ILLNESS
[FreeTextEntry1] : Malaise and fatigue  [de-identified] : Patient is a 53M with a PMHx of DM2, previous Hepatitis B, chronic pancreatitis, HTN p/w malaise and fatigue. He had a telemedicine visit on 8/7 for c/o symptoms after his CT angio w/ contrast. At the time, he told his provider that he had 4 days of fever, diarrhea, and erythema on his chest that had since resolved. He also endorsed mild non radiating pain in his lower back upon walking and with urination with increased urinary frequency. Notably, the CT angiogram with contrast as follow-up testing for a positive cardiac stress test. This CT demonstrated calcified plaques causing severe stenosis in multiple arteries including the LAD, first and second obtuse marginal branches and RCA.  The patient was scheduled for cardiac catheterization but unfortunately this never took place. The patient also missed an appointment with cardiology due to having a double shift at work. The patient was urged to follow up with cardiology at this visit given his most recent CT results and repeat EKG showing interval changes. It is unclear whether the patient fully appreciates how severe his illness is. During this visit, he was concerned about back pain and diarrhea that he has been experiencing and wanted a UA to rule out renal disease from the contrast he received. He expressed that he would prefer natural, homeopathic remedies wherever possible and seems reluctant to pursue any interventional procedures.

## 2024-08-11 NOTE — HISTORY OF PRESENT ILLNESS
[FreeTextEntry1] : Malaise and fatigue  [de-identified] : Patient is a 53M with a PMHx of DM2, previous Hepatitis B, chronic pancreatitis, HTN p/w malaise and fatigue. He had a telemedicine visit on 8/7 for c/o symptoms after his CT angio w/ contrast. At the time, he told his provider that he had 4 days of fever, diarrhea, and erythema on his chest that had since resolved. He also endorsed mild non radiating pain in his lower back upon walking and with urination with increased urinary frequency. Notably, the CT angiogram with contrast as follow-up testing for a positive cardiac stress test. This CT demonstrated calcified plaques causing severe stenosis in multiple arteries including the LAD, first and second obtuse marginal branches and RCA.  The patient was scheduled for cardiac catheterization but unfortunately this never took place. The patient also missed an appointment with cardiology due to having a double shift at work. The patient was urged to follow up with cardiology at this visit given his most recent CT results and repeat EKG showing interval changes. It is unclear whether the patient fully appreciates how severe his illness is. During this visit, he was concerned about back pain and diarrhea that he has been experiencing and wanted a UA to rule out renal disease from the contrast he received. He expressed that he would prefer natural, homeopathic remedies wherever possible and seems reluctant to pursue any interventional procedures.

## 2024-08-11 NOTE — HISTORY OF PRESENT ILLNESS
[FreeTextEntry1] : Malaise and fatigue  [de-identified] : Patient is a 53M with a PMHx of DM2, previous Hepatitis B, chronic pancreatitis, HTN p/w malaise and fatigue. He had a telemedicine visit on 8/7 for c/o symptoms after his CT angio w/ contrast. At the time, he told his provider that he had 4 days of fever, diarrhea, and erythema on his chest that had since resolved. He also endorsed mild non radiating pain in his lower back upon walking and with urination with increased urinary frequency. Notably, the CT angiogram with contrast as follow-up testing for a positive cardiac stress test. This CT demonstrated calcified plaques causing severe stenosis in multiple arteries including the LAD, first and second obtuse marginal branches and RCA.  The patient was scheduled for cardiac catheterization but unfortunately this never took place. The patient also missed an appointment with cardiology due to having a double shift at work. The patient was urged to follow up with cardiology at this visit given his most recent CT results and repeat EKG showing interval changes. It is unclear whether the patient fully appreciates how severe his illness is. During this visit, he was concerned about back pain and diarrhea that he has been experiencing and wanted a UA to rule out renal disease from the contrast he received. He expressed that he would prefer natural, homeopathic remedies wherever possible and seems reluctant to pursue any interventional procedures.

## 2024-08-11 NOTE — ASSESSMENT
[FreeTextEntry1] : Patient is a 53M with a PMHx of DM2, previous Hepatitis B, chronic pancreatitis, HTN p/w malaise and fatigue.   -Coronary artery disease w/ concern for ischemic myocardial dysfunction  -Patient not currently endorsing symptoms of angina, diaphoresis, pain radiating to shoulder or arm, palpitations, dizziness; however, patient is diabetic and may have cardiac autonomic neuropathy  -CT angiogram shows severe stenosis of multiple coronary arteries requiring cardiac catheterization  -Repeat electrocardiogram to r/o silent MI and/or new ischemic changes since previous EKG        Shows c/f new bundle branch block. Patient appears to be clinically stable; however, the patient should undergo catheterization given severe stenosis and possibility for ruptured plaque causing complete occlusion -In consultation with cardiology, called cardiac cath lab to schedule patient  -Troponin -Serum pro-BNP  C/f contrast associated acute kidney injury  -Contrast-induced nephropathy r/o; patient most likely has diabetic nephropathy given uncontrolled diabetes and hemoglobin A1C of 8.2 -Urinalysis  -Comprehensive metabolic panel   Diabetes Mellitus type 2 (uncontrolled)  -If renal function ok, continue Jardiance and metformin -Follow up next appointment to further optimize diabetes treatment; patient's severe stenosis is concerning and needs to be addressed more urgently. Patient's hemoglobin A1C is down-trending and is under better control than before.  Hypertension (controlled)  -Continue losartan   Health Maintenance  -Follow up next appointment

## 2024-08-11 NOTE — END OF VISIT
[] : Resident [FreeTextEntry3] : hx of DM with fair control, known CAD with positive CTA, cardiac cath pending, missed appt with cardio to arrange procedure. Asymptomatic, feels well, no CP, dizziness, DPE/SOB, palpitation. EKG done and reviewed. Declines going to ER and aware to call on call /go to ER if symptomatic [place] to notify cardiologist attending Dr SANTA Roberts to assit in making f/u appt to avoid any further delays [Time Spent: ___ minutes] : I have spent [unfilled] minutes of time on the encounter.

## 2024-08-11 NOTE — END OF VISIT
[] : Resident [Time Spent: ___ minutes] : I have spent [unfilled] minutes of time on the encounter. [FreeTextEntry3] : hx of DM with fair control, known CAD with positive CTA, cardiac cath pending, missed appt with cardio to arrange procedure. Asymptomatic, feels well, no CP, dizziness, DPE/SOB, palpitation. EKG done and reviewed. Declines going to ER and aware to call on call /go to ER if symptomatic [place] to notify cardiologist attending Dr SANTA Roberts to assit in making f/u appt to avoid any further delays

## 2024-08-12 NOTE — HISTORY OF PRESENT ILLNESS
[Home] : at home, [unfilled] , at the time of the visit. [Medical Office: ([facility])___] : at the medical office located in  [Verbal consent obtained from patient] : the patient, [unfilled] [FreeTextEntry8] : 53M PMH DM2, HLD, prev Hep B, chronic pancreatitis, HTN p/w 2 days of lower back pain. Patient initially stated he started having symptoms after his CT Coronary Angio w/ contrast. He noted he had some subjective fever, diarrhea, and erythema over his chest. These symptoms lasted approximately 4 days, and resolved on their own. More recently he noticed that he had some mild non radiating lower back pain upon walking and urination. Notes having increased frequency. Denies any alleviating factors. Patient denies fevers, chills, n/v, d/c, cp, sob, abd pain, dysuria.   Meds: ASA81, metformin 850BID, jardiance 10qd, fenofibrate 48mg, rosuvastatin 40mg, losartan 25mg  Allergies: NKDA  SHx: Denies smoking, alcohol, drug use

## 2024-08-12 NOTE — HISTORY OF PRESENT ILLNESS
[Home] : at home, [unfilled] , at the time of the visit. [Medical Office: ([facility])___] : at the medical office located in  [Verbal consent obtained from patient] : the patient, [unfilled] [FreeTextEntry8] : 53M PMH DM2, HLD, prev Hep B, chronic pancreatitis, HTN p/w 2 days of lower back pain. Patient initially stated he started having symptoms after his CT Coronary Angio w/ contrast. He noted he had some subjective fever, diarrhea, and erythema over his chest. These symptoms lasted approximately 4 days, and resolved on their own. More recently he noticed that he had some mild non radiating lower back pain upon walking and urination. Notes having increased frequency. Denies any alleviating factors. Patient denies fevers, chills, n/v, d/c, cp, sob, abd pain, dysuria.   Meds: ASA81, metformin 850BID, jardiance 10qd, fenofibrate 48mg, rosuvastatin 40mg, losartan 25mg  Allergies: NKDA  SHx: Denies smoking, alcohol, drug use

## 2024-08-12 NOTE — REVIEW OF SYSTEMS
[Frequency] : frequency [Back Pain] : back pain [Fever] : no fever [Chills] : no chills [Chest Pain] : no chest pain [Shortness Of Breath] : no shortness of breath [Cough] : no cough [Abdominal Pain] : no abdominal pain [Nausea] : no nausea [Diarrhea] : no diarrhea [Vomiting] : no vomiting [Dysuria] : no dysuria [Itching] : no itching [Skin Rash] : no skin rash

## 2024-08-13 ENCOUNTER — APPOINTMENT (OUTPATIENT)
Dept: GASTROENTEROLOGY | Facility: HOSPITAL | Age: 54
End: 2024-08-13
Payer: COMMERCIAL

## 2024-08-13 VITALS
HEART RATE: 81 BPM | RESPIRATION RATE: 19 BRPM | OXYGEN SATURATION: 97 % | SYSTOLIC BLOOD PRESSURE: 132 MMHG | DIASTOLIC BLOOD PRESSURE: 84 MMHG | TEMPERATURE: 97.5 F

## 2024-08-13 DIAGNOSIS — Z12.11 ENCOUNTER FOR SCREENING FOR MALIGNANT NEOPLASM OF COLON: ICD-10-CM

## 2024-08-13 DIAGNOSIS — B18.1 CHRONIC VIRAL HEPATITIS B W/OUT DELTA-AGENT: ICD-10-CM

## 2024-08-13 DIAGNOSIS — R74.01 ELEVATION OF LEVELS OF LIVER TRANSAMINASE LEVELS: ICD-10-CM

## 2024-08-13 PROCEDURE — ZZZZZ: CPT | Mod: GC

## 2024-08-13 NOTE — ASSESSMENT
[FreeTextEntry1] : 50 yo M with uncontrolled T2DM (11.5, 12/2021), hyperTG, diagnosed with HBV / past infection who presents for referral to hepatology clinic for elevated liver tests and HBV past infection, here to follow up elevated liver chemistries and colon cancer screening.   #elevated liver enzymes- likely ESTEVEZ if competing diagnosis is ruled. NAFLD with ESTEVEZ likely 2/2 uncontrolled T2DM, HLD; per chart review has had elevated liver enzymes as far back as 2018 in OP setting and inpatient setting as far back as 2012. ASMA, AMA wnl, elevated RUPERT 1:320 homogeneous, though can be elevated in setting of fatty liver #h/o past HBV infection - Per chart review in 11/2011 had +HBV sAb, +HBV total core Ab, neg HBV sAg, +HAV IgG, neg HCV #colon cancer screening - overdue to screening c-scope (avg risk, no family hx)   Recommendation:  - Fibroscan unable to be completed given high cost ($350)  - Check IGG subset - Check Ferritin, iron panel  - hepatitis B PCR, B surface and core  - patient agreeable to FIT; not agreeable to colonoscopy but will consider  - patient to followup with gastroenterology in 3 months for elevated transaminases and possible colon cancer screening with colonoscopy - prior to colonoscopy, patient will need cardiac clearance  *Patient's [phone] (cell)  Chika Sands MD, PGY6 GI/Hepatology Fellow

## 2024-08-13 NOTE — ASSESSMENT
[FreeTextEntry1] : 50 yo M with uncontrolled T2DM (11.5, 12/2021), hyperTG, diagnosed with HBV / past infection who presents for referral to hepatology clinic for elevated liver tests and HBV past infection, here to follow up elevated liver chemistries and colon cancer screening.   #elevated liver enzymes- likely ESTEVEZ if competing diagnosis is ruled. NAFLD with ESTEVEZ likely 2/2 uncontrolled T2DM, HLD; per chart review has had elevated liver enzymes as far back as 2018 in OP setting and inpatient setting as far back as 2012. ASMA, AMA wnl, elevated RUPERT 1:320 homogeneous, though can be elevated in setting of fatty liver #h/o past HBV infection - Per chart review in 11/2011 had +HBV sAb, +HBV total core Ab, neg HBV sAg, +HAV IgG, neg HCV #colon cancer screening - overdue to screening c-scope (avg risk, no family hx)   Recommendation:  - Fibroscan unable to be completed given high cost ($350)  - Check IGG subset - Check Ferritin, iron panel  - hepatitis B PCR, B surface and core  - patient agreeable to FIT; not agreeable to colonoscopy but will consider  - patient to followup with gastroenterology in 3 months for elevated transaminases and possible colon cancer screening with colonoscopy - prior to colonoscopy, patient will need cardiac clearance  *Patient's [phone] (cell)  Chika Sands MD, PGY6 GI/Hepatology Fellow

## 2024-08-13 NOTE — PHYSICAL EXAM
[Alert] : alert [Well Developed] : well developed [Well Nourished] : well nourished [Sclera] : the sclera and conjunctiva were normal [Hearing Threshold Finger Rub Not [place]] : hearing was normal [Normal Appearance] : the appearance of the neck was normal [No Respiratory Distress] : no respiratory distress [No Acc Muscle Use] : no accessory muscle use [Heart Rate And Rhythm] : heart rate was normal and rhythm regular [None] : no edema [Abdomen Tenderness] : non-tender [No Masses] : no abdominal mass palpated [Normal Sphincter Tone] : normal sphincter tone [Abnormal Walk] : normal gait [No Joint Swelling] : no joint swelling seen [Normal Color / Pigmentation] : normal skin color and pigmentation [Oriented To Time, Place, And Person] : oriented to person, place, and time [Normal Affect] : the affect was normal [Normal Mood] : the mood was normal

## 2024-08-13 NOTE — HISTORY OF PRESENT ILLNESS
[FreeTextEntry1] : 52 yo M with uncontrolled T2DM (11.5, 12/2021), diagnosed with HBV / past infection who presents for referral to hepatology clinic for elevated liver tests and HBV past infection, here to follow up elevated liver chemistries and colon cancer screening.   : Alden [id number]   Last seen by hepatology for 05/31/2022.  In the past, patient has declined FIT and colon cancer screening. Discussed again with patient and patient would like to proceed with FIT testing. Patient declines colonoscopy at this time given other comorbidities. Patient denies melena or hematochezia.   Of note, patient previously seen by cardiology for chest pain, substernal with abnormal stress test.

## 2024-08-13 NOTE — HISTORY OF PRESENT ILLNESS
[FreeTextEntry1] : 52 yo M with uncontrolled T2DM (11.5, 12/2021), diagnosed with HBV / past infection who presents for referral to hepatology clinic for elevated liver tests and HBV past infection, here to follow up elevated liver chemistries and colon cancer screening.   : Alden [id number]   Last seen by hepatology for 05/31/2022.  In the past, patient has declined FIT and colon cancer screening. Discussed again with patient and patient would like to proceed with FIT testing. Patient declines colonoscopy at this time given other comorbidities. Patient denies melena or hematochezia.   Of note, patient previously seen by cardiology for chest pain, substernal with abnormal stress test.

## 2024-08-13 NOTE — PHYSICAL EXAM
[Alert] : alert [Well Developed] : well developed [Well Nourished] : well nourished [Sclera] : the sclera and conjunctiva were normal [Hearing Threshold Finger Rub Not [place]] : hearing was normal [Normal Appearance] : the appearance of the neck was normal [No Respiratory Distress] : no respiratory distress [No Acc Muscle Use] : no accessory muscle use [Heart Rate And Rhythm] : heart rate was normal and rhythm regular [None] : no edema [Abdomen Tenderness] : non-tender [No Masses] : no abdominal mass palpated [Normal Sphincter Tone] : normal sphincter tone [Abnormal Walk] : normal gait [No Joint Swelling] : no joint swelling seen [Normal Color / Pigmentation] : normal skin color and pigmentation [Oriented To Time, Place, And Person] : oriented to person, place, and time [Normal Affect] : the affect was normal [Normal Mood] : the mood was normal

## 2024-08-19 ENCOUNTER — NON-APPOINTMENT (OUTPATIENT)
Age: 54
End: 2024-08-19

## 2024-08-19 DIAGNOSIS — M54.9 DORSALGIA, UNSPECIFIED: ICD-10-CM

## 2024-08-19 DIAGNOSIS — I25.5 ISCHEMIC CARDIOMYOPATHY: ICD-10-CM

## 2024-08-19 DIAGNOSIS — E11.9 TYPE 2 DIABETES MELLITUS WITHOUT COMPLICATIONS: ICD-10-CM

## 2024-08-22 ENCOUNTER — NON-APPOINTMENT (OUTPATIENT)
Age: 54
End: 2024-08-22

## 2024-08-28 ENCOUNTER — NON-APPOINTMENT (OUTPATIENT)
Age: 54
End: 2024-08-28

## 2024-08-29 DIAGNOSIS — Z91.041 RADIOGRAPHIC DYE ALLERGY STATUS: ICD-10-CM

## 2024-08-29 RX ORDER — PREDNISONE 50 MG/1
50 TABLET ORAL
Qty: 3 | Refills: 0 | Status: ACTIVE | COMMUNITY
Start: 2024-08-29 | End: 1900-01-01

## 2024-09-02 ENCOUNTER — INPATIENT (INPATIENT)
Facility: HOSPITAL | Age: 54
LOS: 2 days | Discharge: HOME | DRG: 189 | End: 2024-09-05
Attending: INTERNAL MEDICINE | Admitting: STUDENT IN AN ORGANIZED HEALTH CARE EDUCATION/TRAINING PROGRAM
Payer: MEDICAID

## 2024-09-02 VITALS
SYSTOLIC BLOOD PRESSURE: 152 MMHG | TEMPERATURE: 100 F | DIASTOLIC BLOOD PRESSURE: 87 MMHG | OXYGEN SATURATION: 92 % | HEIGHT: 68 IN | WEIGHT: 136.03 LBS | RESPIRATION RATE: 19 BRPM | HEART RATE: 100 BPM

## 2024-09-02 DIAGNOSIS — I10 ESSENTIAL (PRIMARY) HYPERTENSION: ICD-10-CM

## 2024-09-02 DIAGNOSIS — E78.5 HYPERLIPIDEMIA, UNSPECIFIED: ICD-10-CM

## 2024-09-02 DIAGNOSIS — J81.1 CHRONIC PULMONARY EDEMA: ICD-10-CM

## 2024-09-02 DIAGNOSIS — Z29.9 ENCOUNTER FOR PROPHYLACTIC MEASURES, UNSPECIFIED: ICD-10-CM

## 2024-09-02 DIAGNOSIS — E87.1 HYPO-OSMOLALITY AND HYPONATREMIA: ICD-10-CM

## 2024-09-02 DIAGNOSIS — R07.9 CHEST PAIN, UNSPECIFIED: ICD-10-CM

## 2024-09-02 DIAGNOSIS — E11.9 TYPE 2 DIABETES MELLITUS WITHOUT COMPLICATIONS: ICD-10-CM

## 2024-09-02 DIAGNOSIS — R06.02 SHORTNESS OF BREATH: ICD-10-CM

## 2024-09-02 LAB
ADD ON TEST-SPECIMEN IN LAB: SIGNIFICANT CHANGE UP
ALBUMIN SERPL ELPH-MCNC: 3.4 G/DL — SIGNIFICANT CHANGE UP (ref 3.3–5)
ALP SERPL-CCNC: 143 U/L — HIGH (ref 40–120)
ALT FLD-CCNC: 81 U/L — HIGH (ref 10–45)
ANION GAP SERPL CALC-SCNC: 11 MMOL/L — SIGNIFICANT CHANGE UP (ref 5–17)
ANION GAP SERPL CALC-SCNC: 9 MMOL/L — SIGNIFICANT CHANGE UP (ref 5–17)
APPEARANCE UR: CLEAR — SIGNIFICANT CHANGE UP
APTT BLD: 34.6 SEC — SIGNIFICANT CHANGE UP (ref 24.5–35.6)
APTT BLD: 36.6 SEC — HIGH (ref 24.5–35.6)
AST SERPL-CCNC: 41 U/L — HIGH (ref 10–40)
BACTERIA # UR AUTO: NEGATIVE /HPF — SIGNIFICANT CHANGE UP
BASOPHILS # BLD AUTO: 0.03 K/UL — SIGNIFICANT CHANGE UP (ref 0–0.2)
BASOPHILS NFR BLD AUTO: 0.4 % — SIGNIFICANT CHANGE UP (ref 0–2)
BILIRUB SERPL-MCNC: 0.3 MG/DL — SIGNIFICANT CHANGE UP (ref 0.2–1.2)
BILIRUB UR-MCNC: NEGATIVE — SIGNIFICANT CHANGE UP
BUN SERPL-MCNC: 15 MG/DL — SIGNIFICANT CHANGE UP (ref 7–23)
BUN SERPL-MCNC: 18 MG/DL — SIGNIFICANT CHANGE UP (ref 7–23)
CALCIUM SERPL-MCNC: 8 MG/DL — LOW (ref 8.4–10.5)
CALCIUM SERPL-MCNC: 8.3 MG/DL — LOW (ref 8.4–10.5)
CAST: 0 /LPF — SIGNIFICANT CHANGE UP (ref 0–4)
CHLORIDE SERPL-SCNC: 100 MMOL/L — SIGNIFICANT CHANGE UP (ref 96–108)
CHLORIDE SERPL-SCNC: 99 MMOL/L — SIGNIFICANT CHANGE UP (ref 96–108)
CK MB CFR SERPL CALC: 3.2 NG/ML — SIGNIFICANT CHANGE UP (ref 0–6.7)
CO2 SERPL-SCNC: 21 MMOL/L — LOW (ref 22–31)
CO2 SERPL-SCNC: 22 MMOL/L — SIGNIFICANT CHANGE UP (ref 22–31)
COLOR SPEC: YELLOW — SIGNIFICANT CHANGE UP
CREAT ?TM UR-MCNC: 50 MG/DL — SIGNIFICANT CHANGE UP
CREAT SERPL-MCNC: 0.64 MG/DL — SIGNIFICANT CHANGE UP (ref 0.5–1.3)
CREAT SERPL-MCNC: 0.77 MG/DL — SIGNIFICANT CHANGE UP (ref 0.5–1.3)
DIFF PNL FLD: ABNORMAL
EGFR: 107 ML/MIN/1.73M2 — SIGNIFICANT CHANGE UP
EGFR: 113 ML/MIN/1.73M2 — SIGNIFICANT CHANGE UP
EOSINOPHIL # BLD AUTO: 0.04 K/UL — SIGNIFICANT CHANGE UP (ref 0–0.5)
EOSINOPHIL NFR BLD AUTO: 0.5 % — SIGNIFICANT CHANGE UP (ref 0–6)
FLUAV AG NPH QL: SIGNIFICANT CHANGE UP
FLUBV AG NPH QL: SIGNIFICANT CHANGE UP
GLUCOSE BLDC GLUCOMTR-MCNC: 120 MG/DL — HIGH (ref 70–99)
GLUCOSE BLDC GLUCOMTR-MCNC: 122 MG/DL — HIGH (ref 70–99)
GLUCOSE SERPL-MCNC: 121 MG/DL — HIGH (ref 70–99)
GLUCOSE SERPL-MCNC: 129 MG/DL — HIGH (ref 70–99)
GLUCOSE UR QL: NEGATIVE MG/DL — SIGNIFICANT CHANGE UP
HCT VFR BLD CALC: 31.5 % — LOW (ref 39–50)
HCT VFR BLD CALC: 33.9 % — LOW (ref 39–50)
HGB BLD-MCNC: 10.5 G/DL — LOW (ref 13–17)
HGB BLD-MCNC: 11.2 G/DL — LOW (ref 13–17)
IMM GRANULOCYTES NFR BLD AUTO: 0.4 % — SIGNIFICANT CHANGE UP (ref 0–0.9)
INR BLD: 1.14 RATIO — SIGNIFICANT CHANGE UP (ref 0.85–1.18)
KETONES UR-MCNC: NEGATIVE MG/DL — SIGNIFICANT CHANGE UP
LEUKOCYTE ESTERASE UR-ACNC: NEGATIVE — SIGNIFICANT CHANGE UP
LIDOCAIN IGE QN: 23 U/L — SIGNIFICANT CHANGE UP (ref 7–60)
LYMPHOCYTES # BLD AUTO: 1.39 K/UL — SIGNIFICANT CHANGE UP (ref 1–3.3)
LYMPHOCYTES # BLD AUTO: 16.5 % — SIGNIFICANT CHANGE UP (ref 13–44)
MAGNESIUM SERPL-MCNC: 2.3 MG/DL — SIGNIFICANT CHANGE UP (ref 1.6–2.6)
MAGNESIUM SERPL-MCNC: 2.4 MG/DL — SIGNIFICANT CHANGE UP (ref 1.6–2.6)
MCHC RBC-ENTMCNC: 30.4 PG — SIGNIFICANT CHANGE UP (ref 27–34)
MCHC RBC-ENTMCNC: 30.7 PG — SIGNIFICANT CHANGE UP (ref 27–34)
MCHC RBC-ENTMCNC: 33 GM/DL — SIGNIFICANT CHANGE UP (ref 32–36)
MCHC RBC-ENTMCNC: 33.3 GM/DL — SIGNIFICANT CHANGE UP (ref 32–36)
MCV RBC AUTO: 92.1 FL — SIGNIFICANT CHANGE UP (ref 80–100)
MCV RBC AUTO: 92.1 FL — SIGNIFICANT CHANGE UP (ref 80–100)
MONOCYTES # BLD AUTO: 1.02 K/UL — HIGH (ref 0–0.9)
MONOCYTES NFR BLD AUTO: 12.1 % — SIGNIFICANT CHANGE UP (ref 2–14)
NEUTROPHILS # BLD AUTO: 5.92 K/UL — SIGNIFICANT CHANGE UP (ref 1.8–7.4)
NEUTROPHILS NFR BLD AUTO: 70.1 % — SIGNIFICANT CHANGE UP (ref 43–77)
NITRITE UR-MCNC: NEGATIVE — SIGNIFICANT CHANGE UP
NRBC # BLD: 0 /100 WBCS — SIGNIFICANT CHANGE UP (ref 0–0)
NRBC # BLD: 0 /100 WBCS — SIGNIFICANT CHANGE UP (ref 0–0)
NT-PROBNP SERPL-SCNC: 2207 PG/ML — HIGH (ref 0–300)
OSMOLALITY UR: 225 MOS/KG — LOW (ref 300–900)
PH UR: 6.5 — SIGNIFICANT CHANGE UP (ref 5–8)
PHOSPHATE SERPL-MCNC: 4.2 MG/DL — SIGNIFICANT CHANGE UP (ref 2.5–4.5)
PLATELET # BLD AUTO: 217 K/UL — SIGNIFICANT CHANGE UP (ref 150–400)
PLATELET # BLD AUTO: 220 K/UL — SIGNIFICANT CHANGE UP (ref 150–400)
POTASSIUM SERPL-MCNC: 4.7 MMOL/L — SIGNIFICANT CHANGE UP (ref 3.5–5.3)
POTASSIUM SERPL-MCNC: 4.8 MMOL/L — SIGNIFICANT CHANGE UP (ref 3.5–5.3)
POTASSIUM SERPL-SCNC: 4.7 MMOL/L — SIGNIFICANT CHANGE UP (ref 3.5–5.3)
POTASSIUM SERPL-SCNC: 4.8 MMOL/L — SIGNIFICANT CHANGE UP (ref 3.5–5.3)
POTASSIUM UR-SCNC: 25 MMOL/L — SIGNIFICANT CHANGE UP
PROT ?TM UR-MCNC: 158 MG/DL — HIGH (ref 0–12)
PROT SERPL-MCNC: 6.4 G/DL — SIGNIFICANT CHANGE UP (ref 6–8.3)
PROT UR-MCNC: 300 MG/DL
PROT/CREAT UR-RTO: 3.2 RATIO — HIGH (ref 0–0.2)
PROTHROM AB SERPL-ACNC: 12.5 SEC — SIGNIFICANT CHANGE UP (ref 9.5–13)
RAPID RVP RESULT: DETECTED
RBC # BLD: 3.42 M/UL — LOW (ref 4.2–5.8)
RBC # BLD: 3.68 M/UL — LOW (ref 4.2–5.8)
RBC # FLD: 12.9 % — SIGNIFICANT CHANGE UP (ref 10.3–14.5)
RBC # FLD: 13 % — SIGNIFICANT CHANGE UP (ref 10.3–14.5)
RBC CASTS # UR COMP ASSIST: 2 /HPF — SIGNIFICANT CHANGE UP (ref 0–4)
RSV RNA NPH QL NAA+NON-PROBE: SIGNIFICANT CHANGE UP
RV+EV RNA SPEC QL NAA+PROBE: DETECTED
SARS-COV-2 RNA SPEC QL NAA+PROBE: SIGNIFICANT CHANGE UP
SARS-COV-2 RNA SPEC QL NAA+PROBE: SIGNIFICANT CHANGE UP
SODIUM SERPL-SCNC: 129 MMOL/L — LOW (ref 135–145)
SODIUM SERPL-SCNC: 133 MMOL/L — LOW (ref 135–145)
SODIUM UR-SCNC: 22 MMOL/L — SIGNIFICANT CHANGE UP
SP GR SPEC: 1.01 — SIGNIFICANT CHANGE UP (ref 1–1.03)
SQUAMOUS # UR AUTO: 0 /HPF — SIGNIFICANT CHANGE UP (ref 0–5)
TROPONIN T, HIGH SENSITIVITY RESULT: 107 NG/L — HIGH (ref 0–51)
TROPONIN T, HIGH SENSITIVITY RESULT: 132 NG/L — HIGH (ref 0–51)
TROPONIN T, HIGH SENSITIVITY RESULT: 168 NG/L — HIGH (ref 0–51)
TROPONIN T, HIGH SENSITIVITY RESULT: 188 NG/L — HIGH (ref 0–51)
TROPONIN T, HIGH SENSITIVITY RESULT: 206 NG/L — HIGH (ref 0–51)
UROBILINOGEN FLD QL: 0.2 MG/DL — SIGNIFICANT CHANGE UP (ref 0.2–1)
UUN UR-MCNC: 280 MG/DL — SIGNIFICANT CHANGE UP
WBC # BLD: 5.25 K/UL — SIGNIFICANT CHANGE UP (ref 3.8–10.5)
WBC # BLD: 8.43 K/UL — SIGNIFICANT CHANGE UP (ref 3.8–10.5)
WBC # FLD AUTO: 5.25 K/UL — SIGNIFICANT CHANGE UP (ref 3.8–10.5)
WBC # FLD AUTO: 8.43 K/UL — SIGNIFICANT CHANGE UP (ref 3.8–10.5)
WBC UR QL: 1 /HPF — SIGNIFICANT CHANGE UP (ref 0–5)

## 2024-09-02 PROCEDURE — 99253 IP/OBS CNSLTJ NEW/EST LOW 45: CPT

## 2024-09-02 PROCEDURE — 99285 EMERGENCY DEPT VISIT HI MDM: CPT

## 2024-09-02 PROCEDURE — 99223 1ST HOSP IP/OBS HIGH 75: CPT

## 2024-09-02 PROCEDURE — 71046 X-RAY EXAM CHEST 2 VIEWS: CPT | Mod: 26

## 2024-09-02 PROCEDURE — 99053 MED SERV 10PM-8AM 24 HR FAC: CPT

## 2024-09-02 RX ORDER — DEXTROSE 15 G/33 G
12.5 GEL IN PACKET (GRAM) ORAL ONCE
Refills: 0 | Status: DISCONTINUED | OUTPATIENT
Start: 2024-09-02 | End: 2024-09-05

## 2024-09-02 RX ORDER — PREDNISONE 10 MG
50 TABLET, DOSE PACK ORAL
Refills: 0 | Status: COMPLETED | OUTPATIENT
Start: 2024-09-02 | End: 2024-09-03

## 2024-09-02 RX ORDER — ASPIRIN 81 MG
324 TABLET, DELAYED RELEASE (ENTERIC COATED) ORAL ONCE
Refills: 0 | Status: COMPLETED | OUTPATIENT
Start: 2024-09-02 | End: 2024-09-02

## 2024-09-02 RX ORDER — DEXTROSE 15 G/33 G
25 GEL IN PACKET (GRAM) ORAL ONCE
Refills: 0 | Status: DISCONTINUED | OUTPATIENT
Start: 2024-09-02 | End: 2024-09-05

## 2024-09-02 RX ORDER — GUAIFENESIN 100 MG/5ML
200 LIQUID ORAL EVERY 6 HOURS
Refills: 0 | Status: DISCONTINUED | OUTPATIENT
Start: 2024-09-02 | End: 2024-09-05

## 2024-09-02 RX ORDER — ACETAMINOPHEN 325 MG/1
1000 TABLET ORAL ONCE
Refills: 0 | Status: COMPLETED | OUTPATIENT
Start: 2024-09-02 | End: 2024-09-02

## 2024-09-02 RX ORDER — DEXTROSE 15 G/33 G
15 GEL IN PACKET (GRAM) ORAL ONCE
Refills: 0 | Status: DISCONTINUED | OUTPATIENT
Start: 2024-09-02 | End: 2024-09-05

## 2024-09-02 RX ORDER — FUROSEMIDE 40 MG
40 TABLET ORAL DAILY
Refills: 0 | Status: DISCONTINUED | OUTPATIENT
Start: 2024-09-02 | End: 2024-09-04

## 2024-09-02 RX ORDER — ROSUVASTATIN CALCIUM 10 MG/1
40 TABLET ORAL AT BEDTIME
Refills: 0 | Status: DISCONTINUED | OUTPATIENT
Start: 2024-09-02 | End: 2024-09-02

## 2024-09-02 RX ORDER — HEPARIN SODIUM,BOVINE 1000/ML
5000 VIAL (ML) INJECTION EVERY 8 HOURS
Refills: 0 | Status: DISCONTINUED | OUTPATIENT
Start: 2024-09-02 | End: 2024-09-02

## 2024-09-02 RX ORDER — FUROSEMIDE 40 MG
40 TABLET ORAL EVERY 12 HOURS
Refills: 0 | Status: DISCONTINUED | OUTPATIENT
Start: 2024-09-02 | End: 2024-09-02

## 2024-09-02 RX ORDER — HEPARIN SODIUM,BOVINE 1000/ML
3800 VIAL (ML) INJECTION EVERY 6 HOURS
Refills: 0 | Status: DISCONTINUED | OUTPATIENT
Start: 2024-09-02 | End: 2024-09-03

## 2024-09-02 RX ORDER — HEPARIN SODIUM,BOVINE 1000/ML
3800 VIAL (ML) INJECTION ONCE
Refills: 0 | Status: COMPLETED | OUTPATIENT
Start: 2024-09-02 | End: 2024-09-02

## 2024-09-02 RX ORDER — FUROSEMIDE 40 MG
40 TABLET ORAL ONCE
Refills: 0 | Status: COMPLETED | OUTPATIENT
Start: 2024-09-02 | End: 2024-09-02

## 2024-09-02 RX ORDER — DIPHENHYDRAMINE HCL 50 MG
50 CAPSULE ORAL ONCE
Refills: 0 | Status: COMPLETED | OUTPATIENT
Start: 2024-09-03 | End: 2024-09-03

## 2024-09-02 RX ORDER — GLUCAGON INJECTION, SOLUTION 1 MG/.2ML
1 INJECTION, SOLUTION SUBCUTANEOUS ONCE
Refills: 0 | Status: DISCONTINUED | OUTPATIENT
Start: 2024-09-02 | End: 2024-09-05

## 2024-09-02 RX ORDER — ASPIRIN 81 MG
81 TABLET, DELAYED RELEASE (ENTERIC COATED) ORAL DAILY
Refills: 0 | Status: DISCONTINUED | OUTPATIENT
Start: 2024-09-03 | End: 2024-09-03

## 2024-09-02 RX ORDER — HEPARIN SODIUM,BOVINE 1000/ML
VIAL (ML) INJECTION
Qty: 25000 | Refills: 0 | Status: DISCONTINUED | OUTPATIENT
Start: 2024-09-02 | End: 2024-09-03

## 2024-09-02 RX ADMIN — Medication 750 UNIT(S)/HR: at 20:13

## 2024-09-02 RX ADMIN — Medication 80 MILLIGRAM(S): at 21:42

## 2024-09-02 RX ADMIN — Medication 950 UNIT(S)/HR: at 23:14

## 2024-09-02 RX ADMIN — Medication 3800 UNIT(S): at 23:20

## 2024-09-02 RX ADMIN — Medication 40 MILLIGRAM(S): at 04:18

## 2024-09-02 RX ADMIN — ACETAMINOPHEN 400 MILLIGRAM(S): 325 TABLET ORAL at 09:46

## 2024-09-02 RX ADMIN — GUAIFENESIN 200 MILLIGRAM(S): 100 LIQUID ORAL at 12:09

## 2024-09-02 RX ADMIN — Medication 324 MILLIGRAM(S): at 16:04

## 2024-09-02 RX ADMIN — GUAIFENESIN 200 MILLIGRAM(S): 100 LIQUID ORAL at 21:42

## 2024-09-02 RX ADMIN — Medication 40 MILLIGRAM(S): at 16:04

## 2024-09-02 RX ADMIN — Medication 3800 UNIT(S): at 16:04

## 2024-09-02 RX ADMIN — Medication 750 UNIT(S)/HR: at 16:05

## 2024-09-02 RX ADMIN — Medication 50 MILLIGRAM(S): at 23:12

## 2024-09-02 NOTE — CONSULT NOTE ADULT - SUBJECTIVE AND OBJECTIVE BOX
ID: [id number] Brendon     Cardiology Consult Note   [Please check [email or web address]: [email or web address]" for cardiology service schedule and contact information]    HPI:   54 yo male with history for DM, HTN, HLD, and CT coronary showing multivessel CAD who presented to the ED with SOB, chest pain, and bilateral leg swelling. The patient notes that this morning he developed severe shortness of breath in the middle of the night that woke him from his sleep. He had to get up and go to the sink to wash his face with cold water and took several deep breaths prior to feeling like he could breath again. He notes that for the past 2 weeks he has had leg swelling and intermittent MORTON. During this period, he had developed a cough without productive sputum. During coughing fits he notes left sided chest pain that radiates to his back but resolves quickly after the cough ceases. He denies chest pain specifically with exertion and notes this chest pain can occur at rest, with walking, with working or at any random time. He reports subjective fevers over the last few days and since having developed a cough, but reports no mucous/sputum.     In the outpatient setting, the patient was undergoing an ischemic workup. In 6/2024 he had an ECG stress test that showed significant ECG changes with exercise stress testing. After 12 minutes of exercise he had poor R wave progression, with non-specific ST wave abnormalities. Most notably he had 2mm horizontal ST segment depressions in leads I, II, avL, V2, and V4-V6 which started at the 8:50 portion of the stress test and persisted until 6 minutes after recovery. In follow up to this test, he had a CT angio cardiac on 07/26/24 that showed stenosis of the prox LAD, prox RCA, 1st and 2nd OM marginal branches. The plaque was soft plaque without significant calcification. The patient was scheduled for a cardiac cath 09/06 prior to presentation. In ED, given furosemide 40mg and patient reports increased urination and decreased leg swelling.       PAST MEDICAL & SURGICAL HISTORY:  Diabetes      CAD (coronary artery disease)        FAMILY HISTORY:    SOCIAL HISTORY:  unchanged    Allergies: Notes a contrast reaction in the past.     MEDICATIONS:  furosemide   Injectable 40 milliGRAM(s) IV Push daily  heparin   Injectable 3800 Unit(s) IV Push every 6 hours PRN  heparin  Infusion.  Unit(s)/Hr IV Continuous <Continuous>      guaiFENesin Oral Liquid (Sugar-Free) 200 milliGRAM(s) Oral every 6 hours PRN        atorvastatin 80 milliGRAM(s) Oral at bedtime  dextrose 50% Injectable 25 Gram(s) IV Push once  dextrose 50% Injectable 25 Gram(s) IV Push once  dextrose 50% Injectable 12.5 Gram(s) IV Push once  dextrose Oral Gel 15 Gram(s) Oral once  glucagon  Injectable 1 milliGRAM(s) IntraMuscular once  insulin lispro (ADMELOG) corrective regimen sliding scale   SubCutaneous three times a day before meals  insulin lispro (ADMELOG) corrective regimen sliding scale   SubCutaneous at bedtime    dextrose 5%. 1000 milliLiter(s) IV Continuous <Continuous>  dextrose 5%. 1000 milliLiter(s) IV Continuous <Continuous>        -------------------------------------------------------------------------------------------  PHYSICAL EXAM:  T(C): 36.8 (09-02-24 @ 13:18), Max: 37.6 (09-02-24 @ 02:57)  HR: 78 (09-02-24 @ 13:18) (78 - 100)  BP: 144/83 (09-02-24 @ 13:18) (115/87 - 154/89)  RR: 18 (09-02-24 @ 13:18) (18 - 19)  SpO2: 96% (09-02-24 @ 13:18) (92% - 96%)  Wt(kg): --  I&O's Summary    01 Sep 2024 07:01  -  02 Sep 2024 07:00  --------------------------------------------------------  IN: 250 mL / OUT: 0 mL / NET: 250 mL    02 Sep 2024 07:01  -  02 Sep 2024 18:09  --------------------------------------------------------  IN: 250 mL / OUT: 0 mL / NET: 250 mL        GENERAL: NAD  HEAD: Atraumatic, Normocephalic.  ENT: Moist mucous membranes.  NECK: Supple, JVP 2 cm inferior to the mandible   CHEST/LUNG: Rales appreciated in posterior bases b/l and RM region, w/o rhonchi, wheezing, or rubs. Apices CTA on anterior auscultation. Unlabored respirations.  HEART: Regular rate and rhythm; No murmurs, rubs, or gallops.  ABDOMEN: Bowel sounds present; Soft, Nontender, Nondistended.   EXTREMITIES:  2+ Peripheral Pulses, brisk capillary refill. No clubbing, cyanosis, or edema. Warm well perfused     -------------------------------------------------------------------------------------------  LABS:                          10.5   8.43  )-----------( 217      ( 02 Sep 2024 04:16 )             31.5     09-02    129<L>  |  99  |  15  ----------------------------<  129<H>  4.8   |  21<L>  |  0.64    Ca    8.0<L>      02 Sep 2024 04:16  Mg     2.3     09-02    TPro  6.4  /  Alb  3.4  /  TBili  0.3  /  DBili  x   /  AST  41<H>  /  ALT  81<H>  /  AlkPhos  143<H>  09-02    PT/INR - ( 02 Sep 2024 04:16 )   PT: 12.5 sec;   INR: 1.14 ratio         PTT - ( 02 Sep 2024 04:16 )  PTT:34.6 sec  CARDIAC MARKERS ( 02 Sep 2024 12:16 )  168 ng/L / x     / x     / x     / x     / x      CARDIAC MARKERS ( 02 Sep 2024 06:58 )  132 ng/L / x     / x     / x     / x     / x      CARDIAC MARKERS ( 02 Sep 2024 04:16 )  107 ng/L / x     / x     / x     / x     / x            guaiFENesin Oral Liquid (Sugar-Free) 200 milliGRAM(s) Oral every 6 hours PRN          ID: [id number] Brendon     Cardiology Consult Note   [Please check [email or web address] password: [email or web address]" for cardiology service schedule and contact information]    HPI:   54 yo male with history for DM, HTN, HLD, and CT coronary showing multivessel CAD who presented to the ED with SOB, chest pain, and bilateral leg swelling. The patient notes that this morning he developed severe shortness of breath in the middle of the night that woke him from his sleep. He had to get up and go to the sink to wash his face with cold water and took several deep breaths prior to feeling like he could breath again. He notes that for the past 2 weeks he has had leg swelling and intermittent MORTON. During this period, he had developed a cough without productive sputum. During coughing fits he notes left sided chest pain that radiates to his back but resolves quickly after the cough ceases. He denies chest pain specifically with exertion and notes this chest pain can occur at rest, with walking, with working or at any random time. He reports subjective fevers over the last few days and since having developed a cough, but reports no mucous/sputum.     In the outpatient setting, the patient was undergoing an ischemic workup. In 6/2024 he had an ECG stress test that showed significant ECG changes with exercise stress testing. After 12 minutes of exercise he had poor R wave progression, with non-specific ST wave abnormalities. Most notably he had 2mm horizontal ST segment depressions in leads I, II, avL, V2, and V4-V6 which started at the 8:50 portion of the stress test and persisted until 6 minutes after recovery. In follow up to this test, he had a CT angio cardiac on 07/26/24 that showed stenosis of the prox LAD, prox RCA, 1st and 2nd OM marginal branches. The plaque was soft plaque without significant calcification. The patient was scheduled for a cardiac cath 09/06 prior to presentation. In ED, given furosemide 40mg and patient reports increased urination and decreased leg swelling.       PAST MEDICAL & SURGICAL HISTORY:  Diabetes      CAD (coronary artery disease)        FAMILY HISTORY:    SOCIAL HISTORY:  unchanged    Allergies: Notes a contrast reaction in the past after CCTA recently had a rash and nausea. Noted red bumps on his body after testing..     MEDICATIONS:  furosemide   Injectable 40 milliGRAM(s) IV Push daily  heparin   Injectable 3800 Unit(s) IV Push every 6 hours PRN  heparin  Infusion.  Unit(s)/Hr IV Continuous <Continuous>      guaiFENesin Oral Liquid (Sugar-Free) 200 milliGRAM(s) Oral every 6 hours PRN        atorvastatin 80 milliGRAM(s) Oral at bedtime  dextrose 50% Injectable 25 Gram(s) IV Push once  dextrose 50% Injectable 25 Gram(s) IV Push once  dextrose 50% Injectable 12.5 Gram(s) IV Push once  dextrose Oral Gel 15 Gram(s) Oral once  glucagon  Injectable 1 milliGRAM(s) IntraMuscular once  insulin lispro (ADMELOG) corrective regimen sliding scale   SubCutaneous three times a day before meals  insulin lispro (ADMELOG) corrective regimen sliding scale   SubCutaneous at bedtime    dextrose 5%. 1000 milliLiter(s) IV Continuous <Continuous>  dextrose 5%. 1000 milliLiter(s) IV Continuous <Continuous>        -------------------------------------------------------------------------------------------  PHYSICAL EXAM:  T(C): 36.8 (09-02-24 @ 13:18), Max: 37.6 (09-02-24 @ 02:57)  HR: 78 (09-02-24 @ 13:18) (78 - 100)  BP: 144/83 (09-02-24 @ 13:18) (115/87 - 154/89)  RR: 18 (09-02-24 @ 13:18) (18 - 19)  SpO2: 96% (09-02-24 @ 13:18) (92% - 96%)  Wt(kg): --  I&O's Summary    01 Sep 2024 07:01  -  02 Sep 2024 07:00  --------------------------------------------------------  IN: 250 mL / OUT: 0 mL / NET: 250 mL    02 Sep 2024 07:01  -  02 Sep 2024 18:09  --------------------------------------------------------  IN: 250 mL / OUT: 0 mL / NET: 250 mL        GENERAL: NAD  HEAD: Atraumatic, Normocephalic.  ENT: Moist mucous membranes.  NECK: Supple, JVP 2 cm inferior to the mandible   CHEST/LUNG: Rales appreciated in posterior bases b/l and RM region, w/o rhonchi, wheezing, or rubs. Apices CTA on anterior auscultation. Unlabored respirations.  HEART: Regular rate and rhythm; No murmurs, rubs, or gallops.  ABDOMEN: Bowel sounds present; Soft, Nontender, Nondistended.   EXTREMITIES:  2+ Peripheral Pulses, brisk capillary refill. No clubbing, cyanosis, or edema. Warm well perfused     -------------------------------------------------------------------------------------------  LABS:                          10.5   8.43  )-----------( 217      ( 02 Sep 2024 04:16 )             31.5     09-02    129<L>  |  99  |  15  ----------------------------<  129<H>  4.8   |  21<L>  |  0.64    Ca    8.0<L>      02 Sep 2024 04:16  Mg     2.3     09-02    TPro  6.4  /  Alb  3.4  /  TBili  0.3  /  DBili  x   /  AST  41<H>  /  ALT  81<H>  /  AlkPhos  143<H>  09-02    PT/INR - ( 02 Sep 2024 04:16 )   PT: 12.5 sec;   INR: 1.14 ratio         PTT - ( 02 Sep 2024 04:16 )  PTT:34.6 sec  CARDIAC MARKERS ( 02 Sep 2024 12:16 )  168 ng/L / x     / x     / x     / x     / x      CARDIAC MARKERS ( 02 Sep 2024 06:58 )  132 ng/L / x     / x     / x     / x     / x      CARDIAC MARKERS ( 02 Sep 2024 04:16 )  107 ng/L / x     / x     / x     / x     / x            guaiFENesin Oral Liquid (Sugar-Free) 200 milliGRAM(s) Oral every 6 hours PRN

## 2024-09-02 NOTE — H&P ADULT - PROBLEM SELECTOR PLAN 3
History of triglyceridemia and hyper A1C 8.2% 07/24  - hold home metformin 500 mg BID and Jardiance 10 mg QD    -start low dose sliding scale corrective insulin with meals TID Na on admission 129    - collect urine studies (urine na, urine osm, urine urea) and serum osmoles

## 2024-09-02 NOTE — ED ADULT NURSE NOTE - NSFALLUNIVINTERV_ED_ALL_ED
Bed/Stretcher in lowest position, wheels locked, appropriate side rails in place/Call bell, personal items and telephone in reach/Instruct patient to call for assistance before getting out of bed/chair/stretcher/Non-slip footwear applied when patient is off stretcher/[place] to call system/Physically safe environment - no spills, clutter or unnecessary equipment/Purposeful proactive rounding/Room/bathroom lighting operational, light cord in reach

## 2024-09-02 NOTE — ED PROVIDER NOTE - OBJECTIVE STATEMENT
53 male past medical history of diabetes, hypertension, hypercholesterolemia, chronic pancreatitis secondary to hypertriglyceridemia, abnormal stress test in June 2024 followed by 3 coronary vessel artery disease on cardiac CT in August 2024, plan for angiography/angioplasty on September 6, now presenting with progressive shortness of breath for the past 2 weeks, cough,/left-sided pain for the past 3 days.  Patient reports difficulty breathing at rest, and lying down, also has been noticing swelling developing on both feet.  The past 3 days reports having productive cough–yellow discolored sputum, left-sided chest pain/pleuritic in nature.  No fever/chills, sore throat/runny nose, neck stiffness, vomitings, abdominal pain, urinary/bowel complaints.  Patient is not on diuretics.

## 2024-09-02 NOTE — CONSULT NOTE ADULT - ATTENDING COMMENTS
As per fellow note, recent evaluation for ischemic symptoms, including ETT and CCTA. LHC was planned for later this week; however, presented with chest pain and NSTEMI. Presently, asymptomatic. Exam unremarkable. Needs cardiac cath, with premeds given h/o dye reaction. Patient wishes to further discuss with family prior to moving forward.

## 2024-09-02 NOTE — ED PROVIDER NOTE - NSICDXPASTMEDICALHX_GEN_ALL_CORE_FT
PAST MEDICAL HISTORY:  Diabetes      PAST MEDICAL HISTORY:  CAD (coronary artery disease)     Diabetes

## 2024-09-02 NOTE — H&P ADULT - PROBLEM SELECTOR PLAN 5
DVT:   Diet:    Dispo      Case Discussed with      ************************  Hank Cedillo MD  PGY-1 Medicine  ************************ -Prescribed losartan outpatient, but patient is not adherent  -low threshold to resume inpatient History of triglyceridemia and hyperlipidemia with chronic pancreatitis 2/2 hypertriglyceridemia  lipase: 23    c/w gemfibrozil 600mg qd History of triglyceridemia and hyperlipidemia with chronic pancreatitis 2/2 hypertriglyceridemia  lipase: 23  check triglycerides in the morning

## 2024-09-02 NOTE — CONSULT NOTE ADULT - ASSESSMENT
Impression: Patient with elevated troponin concerning for NSTEMI w/ EKG showing   AMY:   MITUL:   HEART:     Recommendations:   #NSTEMI   --Please ensure patient loaded with   --Please ensure patient laoded with 180 of Brillinta, then 90 BID to start 24 hours later (Can do 600 of Plavix if patients bleeding risk is higher)  --Please start heparin gtt, please bolus to achieve ptt per ACS protcol  --Please start atorvastatin 80mg qhs   --Please trend troponin and CKMB until downtrending, please repeat EKG with every troponin   --Please order a transthoracic echo   --Check lipid panel and a1c   --If worsening chest pain or if pain is refractory, please notify the Cardiology fellow   --Please monitor on telemetry  --K>4, Mg>2  --If patient develops hemodynamic instability, please call    Please call with questions or concerns at [id number] [phone]    Thank you for this interesting consult    Dash Fitzgerald MD  Cardiology Fellow       Impression: Patient with elevated troponin concerning for NSTEMI w/ EKG showing   AMY:   MITUL:   HEART:     Recommendations:   #NSTEMI   --Please ensure patient loaded with   --HOLD off on P2Y12 given severe RCA and LAD disease seen on CT coronary outpatient, young age and DM2, possible CABG candidate should he have true multivessel disease   --Please start heparin gtt, please bolus to achieve ptt per ACS protcol  --Please start atorvastatin 80mg qhs   --Please trend troponin and CKMB until downtrending, please repeat EKG with every troponin   --Please order a transthoracic echo   --Check lipid panel, a1c and lipoprotein a   --If worsening chest pain or if pain is refractory, please notify the Cardiology fellow   --Please monitor on telemetry  --K>4, Mg>2  --If patient develops hemodynamic instability, please call    Please call with questions or concerns.     Thank you for this interesting consult    Dash Fitzgerald MD  Cardiology Fellow     54 yo male with history for DM, HTN, HLD, and CT coronary showing multivessel CAD who presented to the ED with SOB, chest pain, and bilateral leg swelling found to have elevated troponins and pulmonary edema on cxr. Cardiology consulted for elevated troponin.     EK2024 TWI in III and <1.5 mm DASHAWN in V1   Biomarkers; Troponin 107->132 ->168   Stress Test 2024: Poor R wave progression, with non-specific ST wave abnormalities of 2mm horizontal DASHAWN in V1 and avR, ST segment depressions in leads I, II, avL, V2, and V4-V6  CT coronary Angiogram 24L: Severe stenosis of the prox LAD, prox RCA, 1st and 2nd OM marginal branches  CXR: 24: pulmonary edema with vascular congestion   AMY: 3   MITUL: 104 points    Impression: Patient is a 53 year old gentlemen with multiple risk factors, a positive EKG stress test, and CT coronary showing multiple stenosis suggestive of multivessel disease is now presenting with elevated troponins  concerning for type 1 vs. type 2 NSTEMI complicated by likely acute decompensated heart failure. DDx would include cardiomyopathy volume driven ischemia vs. viral myocarditis vs. infection causing type 2 NSTEMI. The patient notes subjective fevers, but without leukocytosis or clear sign of infection. Given the CT coronary findings concerning for multivessel disease, known diabetes and young age, patient will potentially be a CABG candidate. Additionally his EKG pattern during his stress test in 2024 with DASHAWN in avr and v1 with diffuse ST depressions >6 leads is classic for multivessel disease. His current EKG is not as suggestive of a severe acute occult lesion, nonetheless has a reasonable Mitul score and warrants LHC. He remarked a contrast reaction after his CT coronary and would require pretreatment.     Recommendations:   #NSTEMI,   --Please ensure patient loaded with   --HOLD off on P2Y12 given severe RCA and LAD disease seen on CT coronary outpatient, young age and DM2, possible CABG candidate should he have true multivessel disease   --Please start heparin gtt, please bolus to achieve ptt per ACS protcol  --Please start atorvastatin 80mg qhs   - Give Lasix 40mg IV stat, goal net negative 1-2L   --Please trend troponin and CKMB until downtrending, please repeat EKG with every troponin   --Please order a transthoracic echo   --Check lipid panel, a1c and lipoprotein a   --Allergy Protocol: Given patient reported a rash with swelling and bumps to CCTA would favor empiric treatment   >Prednisone 40mg 17 hours   --If worsening chest pain or if pain is refractory, please notify the Cardiology fellow   --Please monitor on telemetry  --K>4, Mg>2    #Volume overload   --Give lasix 40mg IV stat x1  --: BNP 2207, trend q72 hours   --Goal net negative 1-2L    --Strict ins and outs   --Daily standing weights     Please call with questions or concerns, if patient develops hemodynamic instability, please notify the Cardiology fellow immediately.     Thank you for this interesting consult    Dash Fitzgerald MD  Cardiology Fellow     54 yo male with history for DM, HTN, HLD, and CT coronary showing multivessel CAD who presented to the ED with SOB, chest pain, and bilateral leg swelling found to have elevated troponins and pulmonary edema on cxr. Cardiology consulted for elevated troponin.     EK2024 TWI in III and <1.5 mm DASHAWN in V1   Biomarkers; Troponin 107->132 ->168   Stress Test 2024: Poor R wave progression, with non-specific ST wave abnormalities of 2mm horizontal DASHAWN in V1 and avR, ST segment depressions in leads I, II, avL, V2, and V4-V6  CT coronary Angiogram 24L: Severe stenosis of the prox LAD, prox RCA, 1st and 2nd OM marginal branches  CXR: 24: pulmonary edema with vascular congestion   AMY: 3   MITUL: 104 points    Impression: Patient is a 53 year old gentlemen with multiple risk factors, a positive EKG stress test, and CT coronary showing multiple stenosis suggestive of multivessel disease who is now presenting with elevated troponins  concerning for type 1 vs. type 2 NSTEMI complicated by likely acute decompensated heart failure. DDx would include cardiomyopathy w/ volume driven ischemia vs. viral myocarditis vs. infection causing type 2 NSTEMI. The patient notes subjective fevers, but without leukocytosis or clear sign of infection. Given the CT coronary findings concerning for multivessel disease, known diabetes and young age, patient will potentially be a CABG candidate. Additionally his EKG pattern during his stress test in 2024 with DASHAWN in avr and v1 with diffuse ST depressions >6 leads is classic for multivessel disease. His current EKG is not as suggestive of a severe acute occult lesion, nonetheless has a reasonable Mitul score and warrants [facility] for better characterization. He noted a contrast reaction after his CT coronary where he developed nausea and a red skin rash, which may require pretreatment.     Recommendations:   #NSTEMI,   --Please ensure patient loaded with   --HOLD off on P2Y12 given severe RCA, LAD, OM1 and OM2 disease seen on CT coronary outpatient, young age and DM2, possible CABG candidate should he have true multivessel disease   --Please start heparin gtt, please bolus to achieve ptt per ACS protcol  --Please start atorvastatin 80mg qhs   - Give Lasix 40mg IV stat, goal net negative 1-2L   --Please trend troponin and CKMB until downtrending, please repeat EKG with every troponin   --Please order a transthoracic echo   --Check lipid panel, a1c and lipoprotein a   - Tentative plan for possible LHC on 9/3 vs.    --Allergy Protocol: Given patient reported a rash with swelling and bumps to CCTA would favor empiric pretreatment:   >>13 hours/11PM : Prednisone 50mg   >>7 hours/5AM :Prednisone 50mg  >>1 hour/12PM: Prednisone 50mg PO + Diphenhydramine 50mg PO   --If worsening chest pain or if pain is refractory, please notify the Cardiology fellow   --Please monitor on telemetry  --K>4, Mg>2    #Volume overload   --Give lasix 40mg IV stat x1  --: BNP 2207, trend q72 hours   --Goal net negative 1-2L    --Strict ins and outs   --Daily standing weights     Please call with questions or concerns, if patient develops hemodynamic instability, please notify the Cardiology fellow immediately.     Thank you for this interesting consult    Dash Fitzgerald MD  Cardiology Fellow

## 2024-09-02 NOTE — ED PROVIDER NOTE - CLINICAL SUMMARY MEDICAL DECISION MAKING FREE TEXT BOX
3 male past medical history of diabetes, hypertension, hypercholesterolemia, chronic pancreatitis, 3 CVAD, with SOB, cough, left pleuritic chest pain, on exam BL rales on chest ausc, BL below knee pitting edema. Concern for CHF, PNA, ACS, ANICETO, will check labs, CXR. Shall diureze. Likely will req adm.

## 2024-09-02 NOTE — H&P ADULT - NSHPREVIEWOFSYSTEMS_GEN_ALL_CORE
CONSTITUTIONAL:  + Fevers at home, no weakness, or chills  EYES/ENT: No visual changes;  No vertigo or throat pain   NECK: No pain or stiffness  RESPIRATORY: + cough for 2 days +SOB; no wheezing, hemoptysis  CARDIOVASCULAR: Left sided chest paim, radiating to the back, no palpitations  GASTROINTESTINAL: No abdominal or epigastric pain. No nausea, vomiting, or hematemesis; No diarrhea or constipation. No melena or hematochezia.  GENITOURINARY: No dysuria, frequency or hematuria  NEUROLOGICAL: No numbness or weakness  SKIN: No itching, burning, rashes, or lesions   All other review of systems is negative unless indicated above. CONSTITUTIONAL:  + Fevers at home, no weakness, or chills  EYES/ENT: No visual changes;  No vertigo or throat pain   NECK: No pain or stiffness  RESPIRATORY: + cough for 2 days +SOB; no wheezing, hemoptysis  CARDIOVASCULAR: + Left sided chest paim, radiating to the back, no palpitations  GASTROINTESTINAL: No abdominal or epigastric pain. No nausea, vomiting, or hematemesis; No diarrhea or constipation. No melena or hematochezia.  GENITOURINARY: No dysuria, frequency or hematuria  NEUROLOGICAL: No numbness or weakness  SKIN: No itching, burning, rashes, or lesions   All other review of systems is negative unless indicated above.

## 2024-09-02 NOTE — H&P ADULT - HISTORY OF PRESENT ILLNESS
Pt is a 52 yo male with history for DM, HTN, and triple vessel CAD.  Pt is a 54 yo male with history for DM, HTN, HLD, and triple vessel CAD who presented to the ED with SOB, chest pain, and bilateral leg swelling. Patient shared that he has had leg swelling for two week that has been worsening. Subjective fevers over the last few days and patient has developed a cough, but reports no mucous/sputum. When he coughs he has pain on the left side of the chest radiating to the back. Patient had a CT angio cardiac on 07/26/24 that showed triple vessel disease and positive stress test. Pt was scheduled for a cardiac cath 09/06 prior to presentation. In ED, given furosemide 40mg and patient reports increased urination and decreased leg swelling. Pt is diuretic naive  Pt is a 54 yo male with history for DM, HTN, HLD, and triple vessel CAD who presented to the ED with SOB, chest pain, and bilateral leg swelling. Patient shared that he has had leg swelling for two week that has been worsening. Subjective fevers over the last few days and patient has developed a cough, but reports no mucous/sputum. When he coughs he has pain on the left side of the chest radiating to the back. Patient had a CT angio cardiac on 07/26/24 that showed triple vessel disease and positive stress test. Pt was scheduled for a cardiac cath 09/06 prior to presentation. In ED, given furosemide 40mg and patient reports increased urination and decreased leg swelling. Pt is diuretic naive. Pt is a 54 yo male with history for DM, HTN, HLD, and triple vessel CAD who presented to the ED with SOB, chest pain, and bilateral leg swelling. Patient shared that he has had leg swelling for two week that has been worsening. Subjective fevers over the last few days and patient has developed a cough, but reports no mucous/sputum. When he coughs he has pain on the left side of the chest radiating to the back. Patient had a CT angio cardiac on 07/26/24 that showed triple vessel disease. Pt was scheduled for a cardiac cath 09/06 prior to presentation. In ED, given furosemide 40mg and patient reports increased urination and decreased leg swelling. Pt is diuretic naive.

## 2024-09-02 NOTE — H&P ADULT - NSHPPHYSICALEXAM_GEN_ALL_CORE
GENERAL: NAD, well-groomed, well-developed  EYES: No proptosis, no lid lag, anicteric  HEENT:  Atraumatic, Normocephalic, moist mucous membranes  THYROID: Normal size, no palpable nodules  RESPIRATORY: Crackles in the lower lung fields bilaterally, no wheezign  CARDIOVASCULAR: Regular rate and rhythm; No murmurs; no peripheral edema  GI: Soft, nontender, non distended, normal bowel sounds  SKIN: Bl lower leg edema +1, No rashes or lesions  MUSCULOSKELETAL: Full range of motion, normal strength  NEURO: sensation intact, extraocular movements intact, no tremor  PSYCH: Alert and oriented x 3, normal affect, normal mood GENERAL: NAD, well-groomed, well-developed  EYES: No proptosis, no lid lag, anicteric  HEENT:  Atraumatic, Normocephalic, moist mucous membranes  THYROID: Normal size, no palpable nodules  RESPIRATORY: Crackles in the lower lung fields bilaterally, no wheezing  CARDIOVASCULAR: Regular rate and rhythm; No murmurs; no peripheral edema  GI: Soft, nontender, non distended, normal bowel sounds  SKIN: Bl lower leg edema +1, No rashes or lesions  MUSCULOSKELETAL: Full range of motion, normal strength  NEURO: sensation intact, extraocular movements intact, no tremor  PSYCH: Alert and oriented x 3, normal affect, normal mood

## 2024-09-02 NOTE — H&P ADULT - PROBLEM SELECTOR PLAN 1
Presenting after two days of shortness of breath. Crackles in the lower lung fields bilaterally. Presenting after two days of shortness of breath. Crackles in the lower lung fields bilaterally. Concern for pulm edema 2/2 cardiac etiology vs infectious     - furosemide 40mg in ED  - Currently on RA Presenting after two days of shortness of breath. Crackles in the lower lung fields bilaterally. Concern for pulm edema 2/2 cardiac etiology vs infectious. Furosemide 40mg in ED    - Currently on RA  - C/w furosemide 40mg BID  - F/u Legionella  - I/Os  - daily Weights  - F/u echo

## 2024-09-02 NOTE — H&P ADULT - ASSESSMENT
Pt is a 52 yo male with history of DM2, previous Hepatitis B, chronic pancreatitis, and HTN presenting with SOB with left chest pain. Pt had a CT cardiac on July 26th showing triple vessel disease and was scheduled for a cardiac cath on 9/6 prior to presentation to the ED. Concern for ACS vs CHF vs CHF exacerbation 2/2 viral illness. Pt is a 54 yo male with history of DM2, previous Hepatitis B, chronic pancreatitis, and HTN presenting with SOB with left chest pain. Pt had a CT cardiac on July 26th showing triple vessel disease and was scheduled for a cardiac cath on 9/6 prior to presentation to the ED. Concern for ACS vs CHF exacerbation 53M with history of DM2, previous Hepatitis B, chronic pancreatitis, and HTN presenting with SOB with left chest pain. Pt had a CT cardiac on July 26th showing triple vessel disease and was scheduled for a cardiac cath on 9/6 prior to presentation to the ED. Concern for ACS vs CHF exacerbation

## 2024-09-02 NOTE — H&P ADULT - PROBLEM SELECTOR PLAN 6
Code: Full code  DVT:   Diet: DASH low fat  Dispo: pending medical optimization      Case Discussed with      ************************  Hank Cedillo MD  PGY-1 Medicine  ************************ -Prescribed losartan outpatient, but patient is not adherent  -low threshold to resume inpatient

## 2024-09-02 NOTE — ED PROVIDER NOTE - PHYSICAL EXAMINATION
PHYSICAL EXAM:  GENERAL: NAD, lying in bed comfortably  HEAD:  Atraumatic, Normocephalic  EYES: EOMI, PERRLA, conjunctiva and sclera clear  ENT: No erythema/pallor/petechiae/lesions; TMs clear b/l  NECK: Supple, No JVD  LUNG: BL rales   HEART: RRR, +S1/S2; No m/r/g  ABDOMEN: soft, NT/ND; BS audible   EXTREMITIES:  2+ Peripheral Pulses, brisk cap refill. BL below knee pitting pedal edema   NERVOUS SYSTEM:  AAOx3, speech clear. No sensory/motor deficits   SKIN: No rashes or lesions

## 2024-09-02 NOTE — H&P ADULT - NSHPSOCIALHISTORY_GEN_ALL_CORE
Originally from [place], lives with ,  for 24 years. Works at a [employer]. Denies tobacco, alcohol, or illicit drug use. ] Originally from [place], lives with ,  for 24 years. Works at a [employer]. Denies tobacco, alcohol, or illicit drug use.

## 2024-09-02 NOTE — ED PROVIDER NOTE - PROGRESS NOTE DETAILS
Michael VILLA: CDU no beds. Michael VILLA: [place] interpretor used # [id number] Tess, patient informed about labs/X-ray, agreed to remain admitted to hosp for ongoing care.

## 2024-09-02 NOTE — H&P ADULT - PROBLEM SELECTOR PLAN 4
-Prescribed losartan History of triglyceridemia and hyperlipidemia with chronic pancreatitis 2/2 hypertriglyceridemia    c/w gemfibrozil 600mg qd A1C 8.2% 07/24  - hold home metformin 500 mg BID and Jardiance 10 mg QD  - start low dose sliding scale corrective insulin with meals TID

## 2024-09-02 NOTE — ED ADULT NURSE NOTE - OBJECTIVE STATEMENT
52 y/o M PMHx DM, HTN, hypercholesterolemia, chronic pancreatitis presents to the ED w/ progressive shortness of breath x 2 weeks. Pt also reporting left sided chest pain associated with cough with productive yellow sputum for the last 3 days. Pt also reporting B/L feet swelling. Pt otherwise denies fever, chills, abdominal pain, n/v/d, urinary sx. Pt is A&OX4, awake and alert, answering questions appropriately, breathing unlabored and spontaneous. Capillary refill <2 seconds, B/L rales noted to lungs on auscultation. Pt placed on cardiac monitor with NSR. Pt dressed in gown, IV access obtained. Stretcher locked in place at lowest position with side rails up. Call bell within reach.

## 2024-09-02 NOTE — ED PROVIDER NOTE - ATTENDING CONTRIBUTION TO CARE
52 yo M with PMHx of hypertension, diabetes, new diagnosis of triple vessel disease presents with cough, leg swelling and chest pain   PE: well appearing, nontoxic, no respiratory distress.  Neuro nonfocal.  2+ pitting edema bilaterally. Skin intact. Psych normal mood.    MDM: Differential diagnosis includes but is not limited to ACS, muscle strain, pneumonia, pulmonary edema, chf, costochondritis, pericarditis, URI, PE    Will assess with CMP, CBC. troponin, bnp, ekg, cxr   ordered for 40 mg iv lasix for diuresis and bedside POCUS with b-lines       Refer to progress notes for continued care

## 2024-09-02 NOTE — H&P ADULT - NSHPLABSRESULTS_GEN_ALL_CORE
LABS:                         10.5   8.43  )-----------( 217      ( 02 Sep 2024 04:16 )             31.5     09-02    129<L>  |  99  |  15  ----------------------------<  129<H>  4.8   |  21<L>  |  0.64    Ca    8.0<L>      02 Sep 2024 04:16  Mg     2.3     09-02    TPro  6.4  /  Alb  3.4  /  TBili  0.3  /  DBili  x   /  AST  41<H>  /  ALT  81<H>  /  AlkPhos  143<H>  09-02    PT/INR - ( 02 Sep 2024 04:16 )   PT: 12.5 sec;   INR: 1.14 ratio         PTT - ( 02 Sep 2024 04:16 )  PTT:34.6 sec  Urinalysis Basic - ( 02 Sep 2024 04:16 )    Color: x / Appearance: x / SG: x / pH: x  Gluc: 129 mg/dL / Ketone: x  / Bili: x / Urobili: x   Blood: x / Protein: x / Nitrite: x   Leuk Esterase: x / RBC: x / WBC x   Sq Epi: x / Non Sq Epi: x / Bacteria: x LABS:                         10.5   8.43  )-----------( 217      ( 02 Sep 2024 04:16 )             31.5     09-02    129<L>  |  99  |  15  ----------------------------<  129<H>  4.8   |  21<L>  |  0.64    Ca    8.0<L>      02 Sep 2024 04:16  Mg     2.3     09-02    TPro  6.4  /  Alb  3.4  /  TBili  0.3  /  DBili  x   /  AST  41<H>  /  ALT  81<H>  /  AlkPhos  143<H>  09-02    PT/INR - ( 02 Sep 2024 04:16 )   PT: 12.5 sec;   INR: 1.14 ratio         PTT - ( 02 Sep 2024 04:16 )  PTT:34.6 sec  Urinalysis Basic - ( 02 Sep 2024 04:16 )    Color: x / Appearance: x / SG: x / pH: x  Gluc: 129 mg/dL / Ketone: x  / Bili: x / Urobili: x   Blood: x / Protein: x / Nitrite: x   Leuk Esterase: x / RBC: x / WBC x   Sq Epi: x / Non Sq Epi: x / Bacteria: x    < from: Xray Chest 2 Views PA/Lat (09.02.24 @ 05:13) >    INTERPRETATION:  CLINICAL INDICATION: SOB    TECHNIQUE: 2 views; Frontal and lateral views of the chest were obtained.    COMPARISON: CT chest 7/26/2024.    FINDINGS:  The heart size is normal.  No focal consolidation.  Small pleural effusions.  No pneumothorax.    IMPRESSION:  No focal consolidation.  Compatible with moderate pulmonary edema.    < end of copied text >

## 2024-09-02 NOTE — H&P ADULT - NSHPLANGLIMITEDENGLISH_GEN_A_CORE

## 2024-09-02 NOTE — H&P ADULT - PROBLEM SELECTOR PLAN 7
Code: Full code  DVT:   Diet: DASH low fat  Dispo: pending medical optimization      Case Discussed with      ************************  Hank Cedillo MD  PGY-1 Medicine  ************************ Code: Full code  DVT: Heparin qd  Diet: DASH low fat  Dispo: pending medical optimization      Case Discussed with      ************************  Hank Cedillo MD  PGY-1 Medicine  ************************ Code: Full code  DVT: Heparin 5000 units qd  Diet: DASH low fat  Dispo: pending medical optimization      Case Discussed with Dr. Liu    ************************  Hank Cedillo MD  PGY-1 Medicine  ************************

## 2024-09-02 NOTE — H&P ADULT - PROBLEM SELECTOR PLAN 2
Left sided chest pain beginning two days ago that radiates to the back    Concern for ACS vs pancreatitis vs CHF    HS Trop outpatient 08/09/24: 6  HS Trop in ED: 107 --> 132    f/u morning lipid panel Left sided chest pain beginning two days ago that radiates to the back. Crackles on lower lung fields bilterally. Concern for ACS vs pancreatitis vs CHF  HS Trop outpatient 08/09/24: 6  HS Trop in ED: 107 --> 132  No leukocytosis  Xray - No focal consolidation. Compatible with moderate pulmonary edema.  EKG showed no ST changes  proBNP - 2207    -continue trending trop q8  -f/u morning lipid panel Left sided chest pain beginning two days ago that radiates to the back. Crackles on lower lung fields bilterally. Concern for ACS vs pancreatitis vs CHF  HS Trop outpatient 08/09/24: 6  HS Trop in ED: 107 --> 132  No leukocytosis  Xray - No focal consolidation. Compatible with moderate pulmonary edema.  EKG showed no ST changes  proBNP - 2207    -continue trending trop q8  -f/u morning lipid panel/triglycerides  -guaifenesin prn for cough Left sided chest pain beginning two days ago that radiates to the back. Crackles on lower lung fields bilterally. Concern for ACS vs pancreatitis vs CHF. Was scheduled for an outpatient angio/cath on 9/6 because of triple vessel disease on cardiac CT, but presented to the ED prior to going for the procedure    HS Trop outpatient 08/09/24: 6  HS Trop in ED: 107 --> 132--> 168  proBNP - 2207  No leukocytosis  Xray - No focal consolidation. Compatible with moderate pulmonary edema.  EKG showed no ST changes    -continue trending trop q8  -guaifenesin prn for cough  - appreciate Cardiology recs for in-patient interventions

## 2024-09-03 LAB
A1C WITH ESTIMATED AVERAGE GLUCOSE RESULT: 8.1 % — HIGH (ref 4–5.6)
ADD ON TEST-SPECIMEN IN LAB: SIGNIFICANT CHANGE UP
ALBUMIN SERPL ELPH-MCNC: 3.5 G/DL — SIGNIFICANT CHANGE UP (ref 3.3–5)
ALP SERPL-CCNC: 136 U/L — HIGH (ref 40–120)
ALT FLD-CCNC: 67 U/L — HIGH (ref 10–45)
ANION GAP SERPL CALC-SCNC: 11 MMOL/L — SIGNIFICANT CHANGE UP (ref 5–17)
APTT BLD: 34.9 SEC — SIGNIFICANT CHANGE UP (ref 24.5–35.6)
APTT BLD: 37.6 SEC — HIGH (ref 24.5–35.6)
AST SERPL-CCNC: 30 U/L — SIGNIFICANT CHANGE UP (ref 10–40)
BILIRUB SERPL-MCNC: 0.3 MG/DL — SIGNIFICANT CHANGE UP (ref 0.2–1.2)
BUN SERPL-MCNC: 18 MG/DL — SIGNIFICANT CHANGE UP (ref 7–23)
CALCIUM SERPL-MCNC: 8.7 MG/DL — SIGNIFICANT CHANGE UP (ref 8.4–10.5)
CHLORIDE SERPL-SCNC: 104 MMOL/L — SIGNIFICANT CHANGE UP (ref 96–108)
CHOLEST SERPL-MCNC: 128 MG/DL — SIGNIFICANT CHANGE UP
CK MB CFR SERPL CALC: 2.3 NG/ML — SIGNIFICANT CHANGE UP (ref 0–6.7)
CK MB CFR SERPL CALC: 2.9 NG/ML — SIGNIFICANT CHANGE UP (ref 0–6.7)
CO2 SERPL-SCNC: 20 MMOL/L — LOW (ref 22–31)
CREAT SERPL-MCNC: 0.68 MG/DL — SIGNIFICANT CHANGE UP (ref 0.5–1.3)
EGFR: 111 ML/MIN/1.73M2 — SIGNIFICANT CHANGE UP
ESTIMATED AVERAGE GLUCOSE: 186 MG/DL — HIGH (ref 68–114)
GLUCOSE BLDC GLUCOMTR-MCNC: 141 MG/DL — HIGH (ref 70–99)
GLUCOSE BLDC GLUCOMTR-MCNC: 173 MG/DL — HIGH (ref 70–99)
GLUCOSE BLDC GLUCOMTR-MCNC: 183 MG/DL — HIGH (ref 70–99)
GLUCOSE BLDC GLUCOMTR-MCNC: 208 MG/DL — HIGH (ref 70–99)
GLUCOSE BLDC GLUCOMTR-MCNC: 248 MG/DL — HIGH (ref 70–99)
GLUCOSE SERPL-MCNC: 195 MG/DL — HIGH (ref 70–99)
HCT VFR BLD CALC: 35.7 % — LOW (ref 39–50)
HDLC SERPL-MCNC: 33 MG/DL — LOW
HGB BLD-MCNC: 12 G/DL — LOW (ref 13–17)
INR BLD: 1.11 RATIO — SIGNIFICANT CHANGE UP (ref 0.85–1.18)
LEGIONELLA AG UR QL: NEGATIVE — SIGNIFICANT CHANGE UP
LIPID PNL WITH DIRECT LDL SERPL: 75 MG/DL — SIGNIFICANT CHANGE UP
MAGNESIUM SERPL-MCNC: 2.5 MG/DL — SIGNIFICANT CHANGE UP (ref 1.6–2.6)
MCHC RBC-ENTMCNC: 30.5 PG — SIGNIFICANT CHANGE UP (ref 27–34)
MCHC RBC-ENTMCNC: 33.6 GM/DL — SIGNIFICANT CHANGE UP (ref 32–36)
MCV RBC AUTO: 90.8 FL — SIGNIFICANT CHANGE UP (ref 80–100)
NON HDL CHOLESTEROL: 95 MG/DL — SIGNIFICANT CHANGE UP
NRBC # BLD: 0 /100 WBCS — SIGNIFICANT CHANGE UP (ref 0–0)
PHOSPHATE SERPL-MCNC: 3.5 MG/DL — SIGNIFICANT CHANGE UP (ref 2.5–4.5)
PLATELET # BLD AUTO: 255 K/UL — SIGNIFICANT CHANGE UP (ref 150–400)
POTASSIUM SERPL-MCNC: 4.9 MMOL/L — SIGNIFICANT CHANGE UP (ref 3.5–5.3)
POTASSIUM SERPL-SCNC: 4.9 MMOL/L — SIGNIFICANT CHANGE UP (ref 3.5–5.3)
PROT SERPL-MCNC: 7 G/DL — SIGNIFICANT CHANGE UP (ref 6–8.3)
PROTHROM AB SERPL-ACNC: 11.6 SEC — SIGNIFICANT CHANGE UP (ref 9.5–13)
RBC # BLD: 3.93 M/UL — LOW (ref 4.2–5.8)
RBC # FLD: 13 % — SIGNIFICANT CHANGE UP (ref 10.3–14.5)
SODIUM SERPL-SCNC: 135 MMOL/L — SIGNIFICANT CHANGE UP (ref 135–145)
TRIGL SERPL-MCNC: 106 MG/DL — SIGNIFICANT CHANGE UP
TROPONIN T, HIGH SENSITIVITY RESULT: 115 NG/L — HIGH (ref 0–51)
WBC # BLD: 5.32 K/UL — SIGNIFICANT CHANGE UP (ref 3.8–10.5)
WBC # FLD AUTO: 5.32 K/UL — SIGNIFICANT CHANGE UP (ref 3.8–10.5)

## 2024-09-03 PROCEDURE — 99231 SBSQ HOSP IP/OBS SF/LOW 25: CPT

## 2024-09-03 PROCEDURE — 93458 L HRT ARTERY/VENTRICLE ANGIO: CPT | Mod: 26,59

## 2024-09-03 PROCEDURE — 92928 PRQ TCAT PLMT NTRAC ST 1 LES: CPT | Mod: LD

## 2024-09-03 PROCEDURE — 99152 MOD SED SAME PHYS/QHP 5/>YRS: CPT

## 2024-09-03 PROCEDURE — 99233 SBSQ HOSP IP/OBS HIGH 50: CPT | Mod: GC

## 2024-09-03 PROCEDURE — 92978 ENDOLUMINL IVUS OCT C 1ST: CPT | Mod: 26,LD

## 2024-09-03 PROCEDURE — 93010 ELECTROCARDIOGRAM REPORT: CPT

## 2024-09-03 RX ORDER — DIPHENHYDRAMINE HCL 50 MG
50 CAPSULE ORAL ONCE
Refills: 0 | Status: COMPLETED | OUTPATIENT
Start: 2024-09-03 | End: 2024-09-03

## 2024-09-03 RX ORDER — PREDNISONE 10 MG
50 TABLET, DOSE PACK ORAL ONCE
Refills: 0 | Status: COMPLETED | OUTPATIENT
Start: 2024-09-03 | End: 2024-09-03

## 2024-09-03 RX ORDER — METOPROLOL TARTRATE 100 MG/1
25 TABLET ORAL DAILY
Refills: 0 | Status: DISCONTINUED | OUTPATIENT
Start: 2024-09-03 | End: 2024-09-03

## 2024-09-03 RX ORDER — DIPHENHYDRAMINE HCL 50 MG
50 CAPSULE ORAL ONCE
Refills: 0 | Status: DISCONTINUED | OUTPATIENT
Start: 2024-09-03 | End: 2024-09-03

## 2024-09-03 RX ORDER — METOPROLOL TARTRATE 100 MG/1
25 TABLET ORAL DAILY
Refills: 0 | Status: DISCONTINUED | OUTPATIENT
Start: 2024-09-03 | End: 2024-09-05

## 2024-09-03 RX ORDER — ASPIRIN 81 MG
81 TABLET, DELAYED RELEASE (ENTERIC COATED) ORAL DAILY
Refills: 0 | Status: DISCONTINUED | OUTPATIENT
Start: 2024-09-04 | End: 2024-09-05

## 2024-09-03 RX ADMIN — Medication 50 MILLIGRAM(S): at 12:47

## 2024-09-03 RX ADMIN — GUAIFENESIN 200 MILLIGRAM(S): 100 LIQUID ORAL at 23:59

## 2024-09-03 RX ADMIN — Medication 50 MILLIGRAM(S): at 07:03

## 2024-09-03 RX ADMIN — Medication 81 MILLIGRAM(S): at 12:41

## 2024-09-03 RX ADMIN — Medication 50 MILLIGRAM(S): at 15:57

## 2024-09-03 RX ADMIN — Medication 50 MILLIGRAM(S): at 12:42

## 2024-09-03 RX ADMIN — Medication 40 MILLIGRAM(S): at 06:27

## 2024-09-03 RX ADMIN — Medication 1: at 09:10

## 2024-09-03 RX ADMIN — METOPROLOL TARTRATE 25 MILLIGRAM(S): 100 TABLET ORAL at 12:48

## 2024-09-03 RX ADMIN — Medication 80 MILLIGRAM(S): at 21:20

## 2024-09-03 RX ADMIN — Medication 50 MILLIGRAM(S): at 17:41

## 2024-09-03 RX ADMIN — Medication 3800 UNIT(S): at 07:00

## 2024-09-03 RX ADMIN — Medication 1150 UNIT(S)/HR: at 06:56

## 2024-09-03 NOTE — PROGRESS NOTE ADULT - SUBJECTIVE AND OBJECTIVE BOX
52 yo male h/o dm, htn, triple vessel CAD who presented with sob and chest pain. Found to have NSTEMI. Patient admitted for LHC today.    Overnight Events: Patient complaining of throat pain. Was unable to sleep overnight. Counselled regarding LHC. Chest pain free isrrael with no sob.      Review Of Systems: No chest pain, shortness of breath, or palpitations            Current Meds:  aspirin  chewable 81 milliGRAM(s) Oral daily  atorvastatin 80 milliGRAM(s) Oral at bedtime  dextrose 5%. 1000 milliLiter(s) IV Continuous <Continuous>  dextrose 5%. 1000 milliLiter(s) IV Continuous <Continuous>  dextrose 50% Injectable 25 Gram(s) IV Push once  dextrose 50% Injectable 25 Gram(s) IV Push once  dextrose 50% Injectable 12.5 Gram(s) IV Push once  dextrose Oral Gel 15 Gram(s) Oral once  diphenhydrAMINE 50 milliGRAM(s) Oral once  furosemide   Injectable 40 milliGRAM(s) IV Push daily  glucagon  Injectable 1 milliGRAM(s) IntraMuscular once  guaiFENesin Oral Liquid (Sugar-Free) 200 milliGRAM(s) Oral every 6 hours PRN  heparin   Injectable 3800 Unit(s) IV Push every 6 hours PRN  heparin  Infusion.  Unit(s)/Hr IV Continuous <Continuous>  insulin lispro (ADMELOG) corrective regimen sliding scale   SubCutaneous three times a day before meals  insulin lispro (ADMELOG) corrective regimen sliding scale   SubCutaneous at bedtime  predniSONE   Tablet 50 milliGRAM(s) Oral <User Schedule>      Vitals:  T(F): 98.7 (), Max: 98.7 ()  HR: 92 () (78 - 92)  BP: 150/87 () (137/82 - 150/87)  RR: 18 ()  SpO2: 93% ()  I&O's Summary    02 Sep 2024 07:  -  03 Sep 2024 07:00  --------------------------------------------------------  IN: 345 mL / OUT: 1680 mL / NET: -1335 mL    03 Sep 2024 07:01  -  03 Sep 2024 10:52  --------------------------------------------------------  IN: 360 mL / OUT: 0 mL / NET: 360 mL        Physical Exam:  GEN: comfortable appearing, lying in bed in NAD  HEENT: NCAT, MMM  CV: Regular S1, S2, no m/r/g  RESP: CTAB  ABD: Soft, NTND, +BS  EXT: No LE edema, WWP, pulses palpable throughout  NEURO: No focal deficits, AOx3  SKIN:  No rashes                          12.0   5.32  )-----------( 255      ( 03 Sep 2024 05:35 )             35.7     -03    135  |  104  |  18  ----------------------------<  195<H>  4.9   |  20<L>  |  0.68    Ca    8.7      03 Sep 2024 05:34  Phos  3.5     09-03  Mg     2.5     09-03    TPro  7.0  /  Alb  3.5  /  TBili  0.3  /  DBili  x   /  AST  30  /  ALT  67<H>  /  AlkPhos  136<H>  09-03    PT/INR - ( 03 Sep 2024 05:34 )   PT: 11.6 sec;   INR: 1.11 ratio         PTT - ( 03 Sep 2024 05:34 )  PTT:34.9 sec  CARDIAC MARKERS ( 03 Sep 2024 05:36 )  115 ng/L / x     / x     / x     / x     / 2.3 ng/mL  CARDIAC MARKERS ( 02 Sep 2024 22:33 )  188 ng/L / x     / x     / x     / x     / 2.9 ng/mL  CARDIAC MARKERS ( 02 Sep 2024 18:01 )  206 ng/L / x     / x     / x     / x     / 3.2 ng/mL  CARDIAC MARKERS ( 02 Sep 2024 12:16 )  168 ng/L / x     / x     / x     / x     / x      CARDIAC MARKERS ( 02 Sep 2024 06:58 )  132 ng/L / x     / x     / x     / x     / x            Total Cholesterol: 128  LDL: --  HDL: 33  T

## 2024-09-03 NOTE — PROGRESS NOTE ADULT - SUBJECTIVE AND OBJECTIVE BOX
PROGRESS NOTE    RAMO SHERWOOD ([id number])- Patient is a 53y old  Male who presents with a chief complaint of SOB (03 Sep 2024 07:04)      INTERVAL HPI/OVERNIGHT EVENTS:   Patient has no acute events overnight,     SUBJECTIVE: Pt seen at bedside; denies n/v, sob, chest pain.     MEDICATIONS:  aspirin  chewable 81 milliGRAM(s) Oral daily  atorvastatin 80 milliGRAM(s) Oral at bedtime  dextrose 5%. 1000 milliLiter(s) IV Continuous <Continuous>  dextrose 5%. 1000 milliLiter(s) IV Continuous <Continuous>  dextrose 50% Injectable 25 Gram(s) IV Push once  dextrose 50% Injectable 25 Gram(s) IV Push once  dextrose 50% Injectable 12.5 Gram(s) IV Push once  dextrose Oral Gel 15 Gram(s) Oral once  diphenhydrAMINE 50 milliGRAM(s) Oral once  furosemide   Injectable 40 milliGRAM(s) IV Push daily  glucagon  Injectable 1 milliGRAM(s) IntraMuscular once  guaiFENesin Oral Liquid (Sugar-Free) 200 milliGRAM(s) Oral every 6 hours PRN  heparin   Injectable 3800 Unit(s) IV Push every 6 hours PRN  heparin  Infusion.  Unit(s)/Hr IV Continuous <Continuous>  insulin lispro (ADMELOG) corrective regimen sliding scale   SubCutaneous three times a day before meals  insulin lispro (ADMELOG) corrective regimen sliding scale   SubCutaneous at bedtime  predniSONE   Tablet 50 milliGRAM(s) Oral <User Schedule>      PHYSICAL EXAM:  Vital Signs Last 24 Hrs  T(C): 37.1 (03 Sep 2024 05:19), Max: 37.1 (03 Sep 2024 05:19)  T(F): 98.7 (03 Sep 2024 05:19), Max: 98.7 (03 Sep 2024 05:19)  HR: 92 (03 Sep 2024 05:19) (78 - 92)  BP: 150/87 (03 Sep 2024 05:19) (137/82 - 150/87)  BP(mean): --  RR: 18 (03 Sep 2024 05:19) (18 - 18)  SpO2: 93% (03 Sep 2024 05:19) (93% - 96%)    Parameters below as of 03 Sep 2024 05:19  Patient On (Oxygen Delivery Method): room air        GENERAL: NAD, lying in bed comfortably  HEAD:  Atraumatic, Normocephalic  EYES: EOMI, PERRLA. No scleral incterus and no conjunctival injection. Tracks me in room  ENT: Moist mucous membranes  NECK: Supple, No JVD  CHEST/LUNG:Crackles in the lower lung fields bilaterally, no wheezing  HEART: Regular rate and rhythm; No murmurs, rubs, or gallops  ABDOMEN: BS +; Soft, nontender, nondistended  EXTREMITIES:  2+ Peripheral Pulses, brisk capillary refill. No clubbing, cyanosis, or edema  NERVOUS SYSTEM:  A&Ox3, no focal neurological deficits. CN II-XII grossly intact, but not individually tested.  PSYCHIATRIC: Cooperative. Appropriate mood and affect.  SKIN: No rashes or lesions        LABS:                          12.0   5.32  )-----------( 255      ( 03 Sep 2024 05:35 )             35.7     09-03    135  |  104  |  18  ----------------------------<  195<H>  4.9   |  20<L>  |  0.68    Ca    8.7      03 Sep 2024 05:34  Phos  3.5     09-03  Mg     2.5     09-03    TPro  7.0  /  Alb  3.5  /  TBili  0.3  /  DBili  x   /  AST  30  /  ALT  67<H>  /  AlkPhos  136<H>  09-03          Urinalysis Basic - ( 03 Sep 2024 05:34 )    Color: x / Appearance: x / SG: x / pH: x  Gluc: 195 mg/dL / Ketone: x  / Bili: x / Urobili: x   Blood: x / Protein: x / Nitrite: x   Leuk Esterase: x / RBC: x / WBC x   Sq Epi: x / Non Sq Epi: x / Bacteria: x      PT/INR - ( 03 Sep 2024 05:34 )   PT: 11.6 sec;   INR: 1.11 ratio         PTT - ( 03 Sep 2024 05:34 )  PTT:34.9 sec  Lactate Trend    CARDIAC MARKERS ( 03 Sep 2024 05:36 )  x     / x     / x     / x     / 2.3 ng/mL  CARDIAC MARKERS ( 02 Sep 2024 22:33 )  x     / x     / x     / x     / 2.9 ng/mL  CARDIAC MARKERS ( 02 Sep 2024 18:01 )  x     / x     / x     / x     / 3.2 ng/mL      CAPILLARY BLOOD GLUCOSE      POCT Blood Glucose.: 120 mg/dL (02 Sep 2024 22:00)          RADIOLOGY & ADDITIONAL TESTS were viewed by me personally.   EKG:  PROGRESS NOTE    RAMO SHERWOOD ([id number])- Patient is a 53y old  Male who presents with a chief complaint of SOB (03 Sep 2024 07:04)      INTERVAL HPI/OVERNIGHT EVENTS:   Patient has no acute events overnight,     SUBJECTIVE: Pt seen at bedside; denies n/v, sob, chest pain. Doing well overnight and shares improved lower extremity swelling.    MEDICATIONS:  aspirin  chewable 81 milliGRAM(s) Oral daily  atorvastatin 80 milliGRAM(s) Oral at bedtime  dextrose 5%. 1000 milliLiter(s) IV Continuous <Continuous>  dextrose 5%. 1000 milliLiter(s) IV Continuous <Continuous>  dextrose 50% Injectable 25 Gram(s) IV Push once  dextrose 50% Injectable 25 Gram(s) IV Push once  dextrose 50% Injectable 12.5 Gram(s) IV Push once  dextrose Oral Gel 15 Gram(s) Oral once  diphenhydrAMINE 50 milliGRAM(s) Oral once  furosemide   Injectable 40 milliGRAM(s) IV Push daily  glucagon  Injectable 1 milliGRAM(s) IntraMuscular once  guaiFENesin Oral Liquid (Sugar-Free) 200 milliGRAM(s) Oral every 6 hours PRN  heparin   Injectable 3800 Unit(s) IV Push every 6 hours PRN  heparin  Infusion.  Unit(s)/Hr IV Continuous <Continuous>  insulin lispro (ADMELOG) corrective regimen sliding scale   SubCutaneous three times a day before meals  insulin lispro (ADMELOG) corrective regimen sliding scale   SubCutaneous at bedtime  predniSONE   Tablet 50 milliGRAM(s) Oral <User Schedule>      PHYSICAL EXAM:  Vital Signs Last 24 Hrs  T(C): 37.1 (03 Sep 2024 05:19), Max: 37.1 (03 Sep 2024 05:19)  T(F): 98.7 (03 Sep 2024 05:19), Max: 98.7 (03 Sep 2024 05:19)  HR: 92 (03 Sep 2024 05:19) (78 - 92)  BP: 150/87 (03 Sep 2024 05:19) (137/82 - 150/87)  BP(mean): --  RR: 18 (03 Sep 2024 05:19) (18 - 18)  SpO2: 93% (03 Sep 2024 05:19) (93% - 96%)    Parameters below as of 03 Sep 2024 05:19  Patient On (Oxygen Delivery Method): room air        GENERAL: NAD, well-groomed, well-developed  EYES: No proptosis, no lid lag, anicteric  HEENT:  Atraumatic, Normocephalic, moist mucous membranes  THYROID: Normal size, no palpable nodules  RESPIRATORY: Decreased crackles in lower lung fields compared to yesterday, no wheezing  CARDIOVASCULAR: Regular rate and rhythm; No murmurs; No JVP  GI: Soft, nontender, non distended, normal bowel sounds  SKIN: Improved lower extremity edema, No rashes or lesions  MUSCULOSKELETAL: Full range of motion, normal strength  NEURO: sensation intact, extraocular movements intact, no tremor  PSYCH: Alert and oriented x 3, normal affect, normal mood      LABS:                        12.0   5.32  )-----------( 255      ( 03 Sep 2024 05:35 )             35.7     09-03    135  |  104  |  18  ----------------------------<  195<H>  4.9   |  20<L>  |  0.68    Ca    8.7      03 Sep 2024 05:34  Phos  3.5     09-03  Mg     2.5     09-03    TPro  7.0  /  Alb  3.5  /  TBili  0.3  /  DBili  x   /  AST  30  /  ALT  67<H>  /  AlkPhos  136<H>  09-03          Urinalysis Basic - ( 03 Sep 2024 05:34 )    Color: x / Appearance: x / SG: x / pH: x  Gluc: 195 mg/dL / Ketone: x  / Bili: x / Urobili: x   Blood: x / Protein: x / Nitrite: x   Leuk Esterase: x / RBC: x / WBC x   Sq Epi: x / Non Sq Epi: x / Bacteria: x      PT/INR - ( 03 Sep 2024 05:34 )   PT: 11.6 sec;   INR: 1.11 ratio         PTT - ( 03 Sep 2024 05:34 )  PTT:34.9 sec  Lactate Trend    CARDIAC MARKERS ( 03 Sep 2024 05:36 )  x     / x     / x     / x     / 2.3 ng/mL  CARDIAC MARKERS ( 02 Sep 2024 22:33 )  x     / x     / x     / x     / 2.9 ng/mL  CARDIAC MARKERS ( 02 Sep 2024 18:01 )  x     / x     / x     / x     / 3.2 ng/mL      CAPILLARY BLOOD GLUCOSE      POCT Blood Glucose.: 120 mg/dL (02 Sep 2024 22:00)          RADIOLOGY & ADDITIONAL TESTS were viewed by me personally.   EKG:

## 2024-09-03 NOTE — PROGRESS NOTE ADULT - PROBLEM SELECTOR PLAN 4
A1C 8.2% 07/24  - hold home metformin 500 mg BID and Jardiance 10 mg QD  - start low dose sliding scale corrective insulin with meals TID

## 2024-09-03 NOTE — CHART NOTE - NSCHARTNOTEFT_GEN_A_CORE
s/p PCI LAD     Cardiac Rehab (STEMI/NSTEMI/ACS/Unstable Angina/CHF/Chronic Stable Angina/Heart Surgery (CABG,Valve)/Post PCI):              *Education on benefits of Cardiac Rehab provided to patient: Yes         *Referral and Prescription Given for Cardiac Rehab : Yes         *Pt given list of locations & instructed to contact their insurance company to review list of participating providers: Yes         *Pt instructed to bring Cardiac Rehab prescription with them to Cardiology Follow up appointment for assistance with enrollment: Yes         *Pt discharged with copies detail cardiovascular history, medications, testing/treatments: Yes s/p PCI LAD-feels well denies chest pain    Discussed importance of DAPT, follow up appt, and cardiac rehab in recovery    # [id number]      Cardiac Rehab (STEMI/NSTEMI/ACS/Unstable Angina/CHF/Chronic Stable Angina/Heart Surgery (CABG,Valve)/Post PCI):              *Education on benefits of Cardiac Rehab provided to patient: Yes         *Referral and Prescription Given for Cardiac Rehab : Yes         *Pt given list of locations & instructed to contact their insurance company to review list of participating providers: Yes         *Pt instructed to bring Cardiac Rehab prescription with them to Cardiology Follow up appointment for assistance with enrollment: Yes         *Pt discharged with copies detail cardiovascular history, medications, testing/treatments: Yes

## 2024-09-03 NOTE — PROGRESS NOTE ADULT - ATTENDING COMMENTS
53M with CAD (s/p CT coronary in July revealing triple vessel disease, scheduled for PCI on 9/6), DM2, hx hepatitis B, chronic pancreatitis, NAFLD, and HTN presenting with volume overload and chest pain and found to have rising troponin.    #NSTEMI, CAD  heparin gtt x 48 hrs  f/u cardiology team re plan for cardiac cath  lipitor, ASA    #Volume overload  Possibly related to heart failure; cannot find an echo in the system  - f/u echo  - continue lasix. monitor BMP,  monitor I/O, daily weights    #Hx of hypertriglyceridemia induced pancreatitis  #HLD  - f/u lipid panel  - continue home statin  - previously on gemfibrozil, hasn't taken in some time    #NAFLD  #Transaminase elevation  - trend LFTs; previously seen by hepatology, thought to be related to metabolic dysfunction and/or hx of hepatitis B virus    #T2DM  - holding home Jardiance and metformin  - ISS qac and qhs    #DVT ppx - HSQ  discharge planning pending cardiology eval. 53M with CAD (s/p CT coronary in July revealing triple vessel disease, scheduled for PCI on 9/6), DM2, hx hepatitis B, chronic pancreatitis, NAFLD, and HTN presenting with volume overload and chest pain and found to have rising troponin.    #NSTEMI, CAD  heparin gtt x 48 hrs  f/u cardiology team re plan for cardiac cath  lipitor, ASA. start toprolxl 25 per cards recs    #Volume overload  Possibly related to heart failure; cannot find an echo in the system  - f/u echo  - continue lasix. monitor BMP,  monitor I/O, daily weights    #Hx of hypertriglyceridemia induced pancreatitis  #HLD  - f/u lipid panel  - continue home statin  - previously on gemfibrozil, hasn't taken in some time    #NAFLD  #Transaminase elevation  - trend LFTs; previously seen by hepatology, thought to be related to metabolic dysfunction and/or hx of hepatitis B virus    #T2DM  - holding home Jardiance and metformin  - ISS qac and qhs    #DVT ppx - HSQ  discharge planning pending cardiology eval.

## 2024-09-03 NOTE — PROGRESS NOTE ADULT - PROBLEM SELECTOR PLAN 2
Left sided chest pain beginning two days ago that radiates to the back. Crackles on lower lung fields bilterally. Concern for ACS vs pancreatitis vs CHF. Was scheduled for an outpatient angio/cath on 9/6 because of triple vessel disease on cardiac CT, but presented to the ED prior to going for the procedure    HS Trop outpatient 08/09/24: 6  HS Trop in ED: 107 --> 132--> 168  proBNP - 2207  No leukocytosis  Xray - No focal consolidation. Compatible with moderate pulmonary edema.  EKG showed no ST changes    -continue trending trop q8  -guaifenesin prn for cough  - appreciate Cardiology recs for in-patient interventions Left sided chest pain beginning two days ago that radiates to the back. Crackles on lower lung fields bilterally. Concern for ACS vs pancreatitis vs CHF. Was scheduled for an outpatient angio/cath on 9/6 because of triple vessel disease on cardiac CT, but presented to the ED prior to going for the procedure    HS Trop outpatient 08/09/24: 6  HS Trop: 107 --> 132--> 168 --> 206 --> 188 --> 115  CKMB:                                        3.2 --> 2.9 --> 2.3  proBNP: 2207  No leukocytosis 9/2-9/3  RVP: Entero/rhinovirus detected  Xray - No focal consolidation. Compatible with moderate pulmonary edema.  EKG showed no ST changes      -guaifenesin prn for cough Left sided chest pain beginning two days ago that radiates to the back. Crackles on lower lung fields bilterally. Concern for ACS vs pancreatitis vs CHF. Was scheduled for an outpatient angio/cath on 9/6 because of triple vessel disease on cardiac CT, but presented to the ED prior to going for the procedure    HS Trop outpatient 08/09/24: 6  HS Trop: 107 --> 132--> 168 --> 206 --> 188 --> 115  CKMB:                                        3.2 --> 2.9 --> 2.3  EKG showed no ST changes  proBNP: 2207  Xray - No focal consolidation. Compatible with moderate pulmonary edema.  RVP: Entero/rhinovirus detected  No leukocytosis 9/2-9/3    Loaded with  9/2 followed by daily ASA 81  C/w heparin gtt per ACS protocol   C/w atorvastatin 80 qhs  C/w lasix 50 mg qd, goal net 1-2 L  C/w guaifenesin prn for cough  f/u TTE  f/u lipid panel, a1c, lipoprotein a  Cardiology planning for [facility] with allergy protocol per cardiology given history of rash after contrast

## 2024-09-03 NOTE — PROGRESS NOTE ADULT - PROBLEM SELECTOR PLAN 3
Na on admission 129    - collect urine studies (urine na, urine osm, urine urea) and serum osmoles Na on admission: 129  Na 9/3: 135

## 2024-09-03 NOTE — PROGRESS NOTE ADULT - ATTENDING COMMENTS
Awaits cardiac cath and TTE for NSTEMI. Known CAD by prior CCTA. Would like to further d/w family prior to moving forward. Continue with asa, heparin, statin, bblocker.

## 2024-09-03 NOTE — PROGRESS NOTE ADULT - PROBLEM SELECTOR PLAN 7
Code: Full code  DVT: Heparin 5000 units qd  Diet: DASH low fat  Dispo: pending medical optimization      Case Discussed with Dr. Liu    ************************  Hank Cedillo MD  PGY-1 Medicine  ************************ Code: Full code  DVT: Heparin gtt  Diet: DASH low fat  Dispo: pending medical optimization      Case Discussed with      ************************  Hank Cedillo MD  PGY-1 Medicine  ************************ Code: Full code  DVT: Heparin gtt  Diet: DASH low fat  Dispo: pending medical optimization      Case Discussed with Dr. Toney    ************************  Hank Cedillo MD  PGY-1 Medicine  ************************

## 2024-09-03 NOTE — PROGRESS NOTE ADULT - PROBLEM SELECTOR PLAN 1
Presenting after two days of shortness of breath. Crackles in the lower lung fields bilaterally. Concern for pulm edema 2/2 cardiac etiology vs infectious. Furosemide 40mg in ED    - Currently on RA  - C/w furosemide 40mg BID  - F/u Legionella  - I/Os  - daily Weights  - F/u echo Presenting after two days of shortness of breath. Crackles in the lower lung fields bilaterally. Concern for pulm edema 2/2 cardiac etiology vs infectious. Furosemide 40mg in ED    - Currently on RA  - C/w furosemide 40mg qd  - F/u Legionella  - I/Os  - daily Weights  - F/u TTE

## 2024-09-04 ENCOUNTER — TRANSCRIPTION ENCOUNTER (OUTPATIENT)
Age: 54
End: 2024-09-04

## 2024-09-04 ENCOUNTER — RESULT REVIEW (OUTPATIENT)
Age: 54
End: 2024-09-04

## 2024-09-04 LAB
ANION GAP SERPL CALC-SCNC: 10 MMOL/L — SIGNIFICANT CHANGE UP (ref 5–17)
BLD GP AB SCN SERPL QL: NEGATIVE — SIGNIFICANT CHANGE UP
BUN SERPL-MCNC: 28 MG/DL — HIGH (ref 7–23)
CALCIUM SERPL-MCNC: 8.7 MG/DL — SIGNIFICANT CHANGE UP (ref 8.4–10.5)
CHLORIDE SERPL-SCNC: 102 MMOL/L — SIGNIFICANT CHANGE UP (ref 96–108)
CO2 SERPL-SCNC: 21 MMOL/L — LOW (ref 22–31)
CREAT SERPL-MCNC: 0.93 MG/DL — SIGNIFICANT CHANGE UP (ref 0.5–1.3)
EGFR: 98 ML/MIN/1.73M2 — SIGNIFICANT CHANGE UP
GLUCOSE BLDC GLUCOMTR-MCNC: 129 MG/DL — HIGH (ref 70–99)
GLUCOSE BLDC GLUCOMTR-MCNC: 138 MG/DL — HIGH (ref 70–99)
GLUCOSE SERPL-MCNC: 196 MG/DL — HIGH (ref 70–99)
HCT VFR BLD CALC: 32.9 % — LOW (ref 39–50)
HGB BLD-MCNC: 10.9 G/DL — LOW (ref 13–17)
LIDOCAIN SERPL-MCNC: <9 NMOL/L — SIGNIFICANT CHANGE UP
MAGNESIUM SERPL-MCNC: 2.5 MG/DL — SIGNIFICANT CHANGE UP (ref 1.6–2.6)
MCHC RBC-ENTMCNC: 30.9 PG — SIGNIFICANT CHANGE UP (ref 27–34)
MCHC RBC-ENTMCNC: 33.1 GM/DL — SIGNIFICANT CHANGE UP (ref 32–36)
MCV RBC AUTO: 93.2 FL — SIGNIFICANT CHANGE UP (ref 80–100)
NRBC # BLD: 0 /100 WBCS — SIGNIFICANT CHANGE UP (ref 0–0)
PHOSPHATE SERPL-MCNC: 4.2 MG/DL — SIGNIFICANT CHANGE UP (ref 2.5–4.5)
PLATELET # BLD AUTO: 267 K/UL — SIGNIFICANT CHANGE UP (ref 150–400)
POTASSIUM SERPL-MCNC: 5 MMOL/L — SIGNIFICANT CHANGE UP (ref 3.5–5.3)
POTASSIUM SERPL-SCNC: 5 MMOL/L — SIGNIFICANT CHANGE UP (ref 3.5–5.3)
RBC # BLD: 3.53 M/UL — LOW (ref 4.2–5.8)
RBC # FLD: 13.2 % — SIGNIFICANT CHANGE UP (ref 10.3–14.5)
RH IG SCN BLD-IMP: POSITIVE — SIGNIFICANT CHANGE UP
SODIUM SERPL-SCNC: 133 MMOL/L — LOW (ref 135–145)
WBC # BLD: 6.89 K/UL — SIGNIFICANT CHANGE UP (ref 3.8–10.5)
WBC # FLD AUTO: 6.89 K/UL — SIGNIFICANT CHANGE UP (ref 3.8–10.5)

## 2024-09-04 PROCEDURE — 99231 SBSQ HOSP IP/OBS SF/LOW 25: CPT

## 2024-09-04 PROCEDURE — 99232 SBSQ HOSP IP/OBS MODERATE 35: CPT

## 2024-09-04 PROCEDURE — 93356 MYOCRD STRAIN IMG SPCKL TRCK: CPT

## 2024-09-04 PROCEDURE — 99239 HOSP IP/OBS DSCHRG MGMT >30: CPT | Mod: GC

## 2024-09-04 PROCEDURE — 93306 TTE W/DOPPLER COMPLETE: CPT | Mod: 26

## 2024-09-04 RX ORDER — PREDNISONE 10 MG
40 TABLET, DOSE PACK ORAL ONCE
Refills: 0 | Status: COMPLETED | OUTPATIENT
Start: 2024-09-04 | End: 2024-09-04

## 2024-09-04 RX ORDER — METOPROLOL TARTRATE 100 MG/1
1 TABLET ORAL
Qty: 30 | Refills: 3
Start: 2024-09-04 | End: 2025-01-01

## 2024-09-04 RX ORDER — ASPIRIN 81 MG
1 TABLET, DELAYED RELEASE (ENTERIC COATED) ORAL
Qty: 30 | Refills: 3
Start: 2024-09-04 | End: 2025-01-01

## 2024-09-04 RX ORDER — FAMOTIDINE 10 MG/ML
40 INJECTION INTRAVENOUS ONCE
Refills: 0 | Status: COMPLETED | OUTPATIENT
Start: 2024-09-04 | End: 2024-09-04

## 2024-09-04 RX ORDER — DIPHENHYDRAMINE HCL 50 MG
50 CAPSULE ORAL ONCE
Refills: 0 | Status: COMPLETED | OUTPATIENT
Start: 2024-09-04 | End: 2024-09-04

## 2024-09-04 RX ORDER — HEPARIN SODIUM,BOVINE 1000/ML
5000 VIAL (ML) INJECTION EVERY 8 HOURS
Refills: 0 | Status: DISCONTINUED | OUTPATIENT
Start: 2024-09-04 | End: 2024-09-05

## 2024-09-04 RX ADMIN — Medication 5000 UNIT(S): at 23:25

## 2024-09-04 RX ADMIN — FAMOTIDINE 40 MILLIGRAM(S): 10 INJECTION INTRAVENOUS at 21:18

## 2024-09-04 RX ADMIN — Medication 50 MILLIGRAM(S): at 21:18

## 2024-09-04 RX ADMIN — Medication 81 MILLIGRAM(S): at 12:20

## 2024-09-04 RX ADMIN — METOPROLOL TARTRATE 25 MILLIGRAM(S): 100 TABLET ORAL at 05:51

## 2024-09-04 RX ADMIN — Medication 40 MILLIGRAM(S): at 21:18

## 2024-09-04 RX ADMIN — Medication 75 MILLIGRAM(S): at 12:21

## 2024-09-04 RX ADMIN — Medication 40 MILLIGRAM(S): at 05:51

## 2024-09-04 RX ADMIN — Medication 80 MILLIGRAM(S): at 23:25

## 2024-09-04 NOTE — PROGRESS NOTE ADULT - PROBLEM SELECTOR PLAN 2
Left sided chest pain beginning two days ago that radiates to the back. Crackles on lower lung fields bilterally. Concern for ACS vs pancreatitis vs CHF. Was scheduled for an outpatient angio/cath on 9/6 because of triple vessel disease on cardiac CT, but presented to the ED prior to going for the procedure    HS Trop outpatient 08/09/24: 6  HS Trop: 107 --> 132--> 168 --> 206 --> 188 --> 115  CKMB:                                        3.2 --> 2.9 --> 2.3  EKG showed no ST changes  proBNP: 2207  Xray - No focal consolidation. Compatible with moderate pulmonary edema.  RVP: Entero/rhinovirus detected  No leukocytosis 9/2-9/3    Loaded with  9/2 followed by daily ASA 81  C/w heparin gtt per ACS protocol   C/w atorvastatin 80 qhs  C/w lasix 50 mg qd, goal net 1-2 L  C/w guaifenesin prn for cough  f/u TTE  f/u lipid panel, a1c, lipoprotein a  Cardiology planning for [facility] with allergy protocol per cardiology given history of rash after contrast Left sided chest pain beginning two days ago that radiates to the back. Crackles on lower lung fields bilterally. Concern for ACS vs pancreatitis vs CHF. Was scheduled for an outpatient angio/cath on 9/6 because of triple vessel disease on cardiac CT, but presented to the ED prior to going for the procedure  HS Trop: 107 --> 132--> 168 --> 206 --> 188 --> 115  CKMB:                                        3.2 --> 2.9 --> 2.3  EKG showed no ST changes  proBNP: 2207  Xray - No focal consolidation. Compatible with moderate pulmonary edema.  RVP: Entero/rhinovirus detected  No leukocytosis 9/2-9/4  TTE - EF 69%, LA moderately dilated     Loaded with  9/2 followed by daily ASA 81  stop lasix 50 mg qd  C/w atorvastatin 80 qhs  C/w guaifenesin prn for cough  f/u lipid panel, a1c, lipoprotein a  s/p LHC 9/3 with LAD stent place, serial lesions, 100% small RCA   no diureses need on d/c per cardiology

## 2024-09-04 NOTE — PROGRESS NOTE ADULT - PROBLEM SELECTOR PLAN 1
Presenting after two days of shortness of breath. Crackles in the lower lung fields bilaterally. Concern for pulm edema 2/2 cardiac etiology vs infectious. Furosemide 40mg in ED    - Currently on RA  - C/w furosemide 40mg qd  - F/u Legionella  - I/Os  - daily Weights  - F/u TTE Presenting after two days of shortness of breath. Crackles in the lower lung fields bilaterally. Concern for pulm edema 2/2 cardiac etiology vs infectious. Furosemide 40mg in ED    - Currently on RA  - Legionella urine negative  - I/Os  - daily Weights  - TTE - EF 69%, LA moderately dilated   - stop furosemide 40mg qd

## 2024-09-04 NOTE — PROGRESS NOTE ADULT - SUBJECTIVE AND OBJECTIVE BOX
Interventional Cardiology Post Cath Progress Note:                Subjective:   Patient feels well- reports having some swelling in his right hand same as radial site from procedure.  He otherwise is feeling well and denies chest pain, shortness of breath. Denies pain, with mild numbness in fingers with no tingling and no swelling around wrist access site.  Some mild tenderness noted with light ecchymosis noted soft.     Tele 24hrs: SR 70s-80s no event some outliers to 90s      MEDICATIONS  (STANDING):  aspirin enteric coated 81 milliGRAM(s) Oral daily  atorvastatin 80 milliGRAM(s) Oral at bedtime  clopidogrel Tablet 75 milliGRAM(s) Oral daily  dextrose 5%. 1000 milliLiter(s) (100 mL/Hr) IV Continuous <Continuous>  dextrose 5%. 1000 milliLiter(s) (50 mL/Hr) IV Continuous <Continuous>  dextrose 50% Injectable 25 Gram(s) IV Push once  dextrose 50% Injectable 25 Gram(s) IV Push once  dextrose 50% Injectable 12.5 Gram(s) IV Push once  dextrose Oral Gel 15 Gram(s) Oral once  glucagon  Injectable 1 milliGRAM(s) IntraMuscular once  heparin   Injectable 5000 Unit(s) SubCutaneous every 8 hours  insulin lispro (ADMELOG) corrective regimen sliding scale   SubCutaneous three times a day before meals  insulin lispro (ADMELOG) corrective regimen sliding scale   SubCutaneous at bedtime  metoprolol succinate ER 25 milliGRAM(s) Oral daily    MEDICATIONS  (PRN):  guaiFENesin Oral Liquid (Sugar-Free) 200 milliGRAM(s) Oral every 6 hours PRN Cough      Objective:  Vital Signs Last 24 Hrs  T(C): 36.9 (04 Sep 2024 04:20), Max: 36.9 (04 Sep 2024 04:20)  T(F): 98.5 (04 Sep 2024 04:20), Max: 98.5 (04 Sep 2024 04:20)  HR: 91 (04 Sep 2024 04:20) (69 - 91)  BP: 109/78 (04 Sep 2024 04:20) (109/78 - 156/85)  BP(mean): 101 (03 Sep 2024 22:15) (95 - 113)  RR: 18 (04 Sep 2024 04:20) (14 - 18)  SpO2: 97% (04 Sep 2024 04:20) (93% - 97%)    Parameters below as of 04 Sep 2024 04:20  Patient On (Oxygen Delivery Method): room air        24 @ 07:01  -  24 @ 07:00  --------------------------------------------------------  IN: 960 mL / OUT: 500 mL / NET: 460 mL                              10.9   6.89  )-----------( 267      ( 04 Sep 2024 06:50 )             32.9         133<L>  |  102  |  28<H>  ----------------------------<  196<H>  5.0   |  21<L>  |  0.93    Ca    8.7      04 Sep 2024 06:53  Phos  4.2       Mg     2.5         TPro  7.0  /  Alb  3.5  /  TBili  0.3  /  DBili  x   /  AST  30  /  ALT  67<H>  /  AlkPhos  136<H>  09    PT/INR - ( 03 Sep 2024 05:34 )   PT: 11.6 sec;   INR: 1.11 ratio         PTT - ( 03 Sep 2024 13:11 )  PTT:37.6 sec  Urinalysis Basic - ( 04 Sep 2024 06:53 )    Color: x / Appearance: x / SG: x / pH: x  Gluc: 196 mg/dL / Ketone: x  / Bili: x / Urobili: x   Blood: x / Protein: x / Nitrite: x   Leuk Esterase: x / RBC: x / WBC x   Sq Epi: x / Non Sq Epi: x / Bacteria: x      Physical Exam:  No apparent distress, alert and oriented times three, appropriate affect  JVD is not elevated, supple  Clear to auscultation with no wheezing, rhonchi or crackles  Regular rate and rhythm with no murmur, rub or gallop  Positive bowel sounds, soft, non-tender, non-distended, no masses/guarding or rebound tenderness  Right Upper Extremity: Soft,  no bleeding with mild ecchymosis-tender to touch, clean/dry/intact- RUE +2 palpable radial pulse.  There is swelling in hand, extremity warm  No clubbing, cyanosis or edema    Ventricular Rate 77 BPM    Atrial Rate 77 BPM    P-R Interval 122 ms    QRS Duration 86 ms    Q-T Interval 386 ms    QTC Calculation(Bazett) 436 ms    P Axis 24 degrees    R Axis -23 degrees    T Axis -13 degrees    Diagnosis Line NORMAL SINUS RHYTHM  MINIMAL VOLTAGE CRITERIA FOR LVH, MAY BE NORMAL VARIANT  NONSPECIFIC ST ABNORMALITY  ABNORMAL ECG  WHEN COMPARED WITH ECG OF 02-SEP-2024 22:05, (UNCONFIRMED)  LVH is now present  Confirmed by Kavin Diaz ([id number]) on 9/3/2024 3:40:03 PM    Study Date:     2024   Name:           RAMO SHERWOOD   :            1970   (53 years)   Gender:         male   MR#:            [id number]   MPI#:           [id number]   Patient Class:  Inpatient     Cath Lab Report    Diagnostic Cardiologist:       Mustapha Gonsalez MD   Interventional Cardiologist:   Mustapha Gonsalez MD   Fellow:                        Maddison Raman MD   Referring Physician:           Simone Jones MD     Procedures Performed   Procedures:   1.    Arterial Access - Right Radial     2.    Diagnostic Coronary Angiography   3.    Left Heart Cath   4.    IVUS   5.    PCI: GUNNAR     Indications:                Myocardial infarction without ST elevation  (NSTEMI)  Lab Visit Indication:      NSTEMI   PCI Status:                elective     Conclusions:     Severe two vessel obstructive coronary artery disease   --Left anterior descending artery   --Right coronary artery   Chronic total occlusion of the right coronary artery   --Left to right, Rentrop grade 3 collateral filling   Mild left main coronary artery disease   Co-dominant system   LVEDP = 22mmHg     Status post angioplasty, IVUS and stenting (drug-eluting/[employer]  [place]) of the left anterior descending artery with resultant  AMY 3 flow     TRANSTHORACIC ECHOCARDIOGRAM REPORT  ________________________________________________________________________________                                      _______       Pt. Name:       RAMO SHERWOOD Study Date:    2024  MRN:            [id number]      YOB: 1970  Accession #:    [id number]       Age:           53 years  Account#:       [id number]    Gender:        M  Heart Rate:     63 bpm          Height:        67.00 in (170.18 cm)  Rhythm:                         Weight:        136.00 lb (61.69 kg)  Blood Pressure: 109/78 mmHg     BSA/BMI:       1.72 m² / 21.30 kg/m²  ________________________________________________________________________________________  Referring Physician: BITA STRICKLAND  Primary Sonographer: Sheree Mayberry RDCS    CPT:               MYOCARDIAL STRAIN IMAGING - 31612.m;ECHO TTE WO CON COMP W                     DOPP - 98635.m  Indication(s):     Chest pain, unspecified - R07.9  Procedure:         Transthoracic echocardiogram with 2-D, M-mode and complete                     spectral and color flow Doppler. Strain imaging performed for                     evaluation of regional and global myocardial shape and                     dimensions.  Ordering Location: [facility]  Admission Status:  Inpatient  Study Information: Image quality for this study is fair.    _______________________________________________________________________________________     CONCLUSIONS:      1. Left ventricular cavity is normal in size. Left ventricular wall thickness is normal. Left ventricular systolic function is normal with an ejection fraction of 69 % by Sanders's method of disks. There are no regional wall motion abnormalities seen.   2. Left ventricular global longitudinal strain is -22.9 % which is normal (< -18%). Images were acquired on a Xquva ultrasound system and processed using myWebRoom strain analysis software with a heart rate of 63 bpm and a blood pressure of 109/78 mmHg.   3. The left ventricular diastolic function is indeterminate, withindeterminate left ventricular filling pressure.   4. Normal right ventricular cavity size, with normal wall thickness, and normal right ventricular systolic function.   5. Moderate mitral regurgitation.   6. Estimated pulmonary artery systolic pressure is 32 mmHg.   7. Compared to the transthoracic echocardiogram performed on 2009, the degree of MR has increased.

## 2024-09-04 NOTE — PROGRESS NOTE ADULT - ATTENDING COMMENTS
53M with CAD (s/p CT coronary in July revealing triple vessel disease, scheduled for PCI on 9/6), DM2, hx hepatitis B, chronic pancreatitis, NAFLD, and HTN presenting with volume overload and chest pain and found to have rising troponin.    #NSTEMI, CAD  s/p heparin gtt. s/p PCI/IVUS GUNNAR x 2 to LAD via RRA on 9/3/24  DAPT. lipitor. started toprolxl 25 per cards recs    #Volume overload  Possibly related to heart failure; cannot find an echo in the system  - s/p IV lasix. Echo done: LVEF 69%, LV diastolic function is indeterminate, normal R ventricular systolic function, moderate MR, discussed with cardiology team, patient now euvolemic, plan to discharge without diuretic for now and patient to follow up in cards clinic within a week. monitor BMP,  monitor I/O, daily weights    #Hx of hypertriglyceridemia induced pancreatitis  #HLD  - f/u lipid panel  - continue home statin  - previously on gemfibrozil, hasn't taken in some time    #NAFLD  #Transaminase elevation  - trend LFTs; previously seen by hepatology, thought to be related to metabolic dysfunction and/or hx of hepatitis B virus    #T2DM  - holding home metformin, jardiance inpatient.  - ISS qac and qhs    #DVT ppx - HSQ  discharge planning 55 mins , patient cleared by cardiology team for discharge, needs close outpatient follow in internal med residents clinic and cardiology fellows clinic within 1 week of hospital discharge. f/u SW regarding medication prescriptions.

## 2024-09-04 NOTE — DISCHARGE NOTE NURSING/CASE MANAGEMENT/SOCIAL WORK - NSDCVIVACCINE_GEN_ALL_CORE_FT
Tdap; 03-Oct-2015 16:08; Shannan Loomis (IGLESIA); [employer]; [id number]; IntraMuscular; Deltoid Right.; 0.5 milliLiter(s); VIS (VIS Published: 09-May-2013, VIS Presented: 03-Oct-2015);

## 2024-09-04 NOTE — PROGRESS NOTE ADULT - SUBJECTIVE AND OBJECTIVE BOX
PROGRESS NOTE    RAMO SHERWOOD ([id number])- Patient is a 53y old  Male who presents with a chief complaint of SOB (04 Sep 2024 11:43)      INTERVAL HPI/OVERNIGHT EVENTS:   Patient has no acute events overnight, night team notified by nurse of hematoma at access site    SUBJECTIVE: Pt seen at bedside; denies n/v, sob, chest pain. Feeling much better and improved ability to sleep on back    MEDICATIONS:  aspirin enteric coated 81 milliGRAM(s) Oral daily  atorvastatin 80 milliGRAM(s) Oral at bedtime  clopidogrel Tablet 75 milliGRAM(s) Oral daily  dextrose 5%. 1000 milliLiter(s) IV Continuous <Continuous>  dextrose 5%. 1000 milliLiter(s) IV Continuous <Continuous>  dextrose 50% Injectable 12.5 Gram(s) IV Push once  dextrose 50% Injectable 25 Gram(s) IV Push once  dextrose 50% Injectable 25 Gram(s) IV Push once  dextrose Oral Gel 15 Gram(s) Oral once  glucagon  Injectable 1 milliGRAM(s) IntraMuscular once  guaiFENesin Oral Liquid (Sugar-Free) 200 milliGRAM(s) Oral every 6 hours PRN  heparin   Injectable 5000 Unit(s) SubCutaneous every 8 hours  insulin lispro (ADMELOG) corrective regimen sliding scale   SubCutaneous three times a day before meals  insulin lispro (ADMELOG) corrective regimen sliding scale   SubCutaneous at bedtime  metoprolol succinate ER 25 milliGRAM(s) Oral daily      PHYSICAL EXAM:  Vital Signs Last 24 Hrs  T(C): 36.7 (04 Sep 2024 12:37), Max: 36.9 (04 Sep 2024 04:20)  T(F): 98 (04 Sep 2024 12:37), Max: 98.5 (04 Sep 2024 04:20)  HR: 71 (04 Sep 2024 12:37) (69 - 91)  BP: 143/72 (04 Sep 2024 12:37) (109/78 - 153/89)  BP(mean): 101 (03 Sep 2024 22:15) (95 - 113)  RR: 18 (04 Sep 2024 12:37) (14 - 18)  SpO2: 94% (04 Sep 2024 12:37) (93% - 97%)    Parameters below as of 04 Sep 2024 12:37  Patient On (Oxygen Delivery Method): room air        GENERAL: NAD, lying in bed comfortably  HEAD:  Atraumatic, Normocephalic  EYES: EOMI, PERRLA. No scleral incterus and no conjunctival injection. Tracks me in room  ENT: Moist mucous membranes  NECK: Supple, No JVD  CHEST/LUNG: Respirations much improved,  Clear to auscultation bilaterally; No rales, rhonchi, wheezing, or rubs. Unlabored respirations  HEART: Regular rate and rhythm; No murmurs, rubs, or gallops  ABDOMEN: BS +; Soft, nontender, nondistended  EXTREMITIES:  No edema, 2+ Peripheral Pulses, brisk capillary refill. No clubbing, cyanosis  NERVOUS SYSTEM:  A&Ox3, no focal neurological deficits. CN II-XII grossly intact, but not individually tested.  PSYCHIATRIC: Cooperative. Appropriate mood and affect.  SKIN: No rashes or lesions        LABS:                          10.9   6.89  )-----------( 267      ( 04 Sep 2024 06:50 )             32.9     09-04    133<L>  |  102  |  28<H>  ----------------------------<  196<H>  5.0   |  21<L>  |  0.93    Ca    8.7      04 Sep 2024 06:53  Phos  4.2     09-04  Mg     2.5     09-04    TPro  7.0  /  Alb  3.5  /  TBili  0.3  /  DBili  x   /  AST  30  /  ALT  67<H>  /  AlkPhos  136<H>  09-03          Urinalysis Basic - ( 04 Sep 2024 06:53 )    Color: x / Appearance: x / SG: x / pH: x  Gluc: 196 mg/dL / Ketone: x  / Bili: x / Urobili: x   Blood: x / Protein: x / Nitrite: x   Leuk Esterase: x / RBC: x / WBC x   Sq Epi: x / Non Sq Epi: x / Bacteria: x      PT/INR - ( 03 Sep 2024 05:34 )   PT: 11.6 sec;   INR: 1.11 ratio         PTT - ( 03 Sep 2024 13:11 )  PTT:37.6 sec  Lactate Trend    CARDIAC MARKERS ( 03 Sep 2024 05:36 )  x     / x     / x     / x     / 2.3 ng/mL  CARDIAC MARKERS ( 02 Sep 2024 22:33 )  x     / x     / x     / x     / 2.9 ng/mL  CARDIAC MARKERS ( 02 Sep 2024 18:01 )  x     / x     / x     / x     / 3.2 ng/mL      CAPILLARY BLOOD GLUCOSE      POCT Blood Glucose.: 138 mg/dL (04 Sep 2024 12:43)          RADIOLOGY & ADDITIONAL TESTS were viewed by me personally.   EKG:

## 2024-09-04 NOTE — PROVIDER CONTACT NOTE (OTHER) - SITUATION
pt s/p cardiac cath to right radial. Site noted to be moderately swollen, pulse faint on right radial but strong on left radial. Pt denies numbness/tingling.

## 2024-09-04 NOTE — DISCHARGE NOTE PROVIDER - NSDCCPCAREPLAN_GEN_ALL_CORE_FT
PRINCIPAL DISCHARGE DIAGNOSIS  Diagnosis: Chest pain  Assessment and Plan of Treatment: When you first came into the hospital, there was concern that your chest pain was cardiac in origin. We trended a cardiac enzyme and it was elevated, so cardiology was involved. Because of your recent outpatient cardiac workup and current presentation, we were concerned that your heart had blockages. We did a left heart catherization and found blockages in the arteries around your heart, which the cardiologist placed a drug eluting stent into. Because we placed a stent in your heart, please make sure to take your Aspirin and Plavix (clopidogrel) without any missed doses, otherwise you are at risk for your stent closing up and causing a heart attack. Please continue to take your rosuvastatin 40mg every day as that will help with your heart health. You will also be discharged on a new medication called Metoprolol 25mg, which is a medication to help your heart and your blood pressure. If you notice chest pain or bleeding from your poop or black poop, please see your primary doctor or your cardiologist immediately. Please follow up with your cardiologist within a week of discharge.      SECONDARY DISCHARGE DIAGNOSES  Diagnosis: SOB (shortness of breath)  Assessment and Plan of Treatment: When you came into the hospital, you were complaining of shortness of breath and chest pain. We suspected that the shortness of breath may have been related to your heart or your lungs so we got a chest x-ray and a viral panel. We found that you had some fluid buildup, so we gave you IV diuretics (lasix) and you responded well. When you leave the hospital, you don't need diuretics. We did a respiratory viral panel and found that you have Rhinovirus, which is the common cold, which may also explain why you had a cough.    Diagnosis: Chest pain  Assessment and Plan of Treatment: When you first came into the hospital

## 2024-09-04 NOTE — DISCHARGE NOTE PROVIDER - NSDCMRMEDTOKEN_GEN_ALL_CORE_FT
aspirin 81 mg oral delayed release tablet: 1 tab(s) orally once a day  clopidogrel 75 mg oral tablet: 1 tab(s) orally once a day  Jardiance 10 mg oral tablet: 1 tab(s) orally once a day  metFORMIN 500 mg oral tablet: 1 tab(s) orally 2 times a day  metoprolol succinate 25 mg oral tablet, extended release: 1 tab(s) orally once a day  rosuvastatin 40 mg oral capsule: 1 cap(s) orally once a day

## 2024-09-04 NOTE — DISCHARGE NOTE PROVIDER - HOSPITAL COURSE
52 yo male with history for DM, HTN, HLD, and triple vessel CAD who presented to the ED with SOB, chest pain, and bilateral leg swelling. Patient had a CT angio cardiac on 07/26/24 that showed triple vessel disease. Pt was scheduled for a cardiac cath 09/06 prior to presentation. In ED, given furosemide 40mg IV and responded well with dec SOB. Noted to have elevated troponin, peaking in 200s, without EKG changes so cardiology consulted. Continued on IV lasix 40mg with improvement of symptoms. Cardiology recommended hep gtt and ASA load for [facility] 9/3, where PCI was placed in LAD, also notable for 100% occluded RCA. TTE wnl. Cardiology ok with dc without diuretics and follow up within a week of discharge.    The patient is afebrile, hemodynamically stable and medically optimized for discharge to home with follow up with cardiology within 1 week and PCP in 2 weeks. On day of discharge, patient is clinically stable with no new exam findings or acute symptoms compared to prior. The patient was seen by the attending physician on the date of discharge and deemed stable and acceptable for discharge. The patient's chronic medical conditions were treated accordingly per the patient's home medication regimen. The patient's medication reconciliation (with changes made to chronic medications), follow up appointments, discharge orders, instructions, and significant lab and diagnostic studies are as noted.    Important Medication Changes and Reason:  - started on ASA 81mg ,plavix 75, Metoprolol XL 25mg qd  - c/w home statin and Jardiance    Active or Pending Issues Requiring Follow-up:  - f/u cardiology within 1 week  - f/u PCP within 2 weeks     Advanced Directives:   [ x ] Full code  [ ] DNR  [ ] Hospice       52 yo male with history for DM, HTN, HLD, and triple vessel CAD who presented to the ED with SOB, chest pain, and bilateral leg swelling. Patient had a CT angio cardiac on 07/26/24 that showed triple vessel disease. Pt was scheduled for a cardiac cath 09/06 prior to presentation. In ED, given furosemide 40mg IV and responded well with dec SOB. Noted to have elevated troponin, peaking in 200s, without EKG changes so cardiology consulted. Continued on IV lasix 40mg with improvement of symptoms. Cardiology recommended hep gtt and ASA load for [facility] 9/3, where PCI was placed in LAD, also notable for 100% occluded RCA. TTE wnl. Cardiology ok with dc without diuretics and follow up within a week of discharge. On  9/4 patient's  reported that patient had swelling of the upper lip and was given benadryl, famotidine, and prednisone given prior history of delayed contrast reaction. Patient was examined the next say and doing well with no     The patient is afebrile, hemodynamically stable and medically optimized for discharge to home with follow up with cardiology within 1 week and PCP in 2 weeks. On day of discharge, patient is clinically stable with no new exam findings or acute symptoms compared to prior. The patient was seen by the attending physician on the date of discharge and deemed stable and acceptable for discharge. The patient's chronic medical conditions were treated accordingly per the patient's home medication regimen. The patient's medication reconciliation (with changes made to chronic medications), follow up appointments, discharge orders, instructions, and significant lab and diagnostic studies are as noted.    Important Medication Changes and Reason:  - started on ASA 81mg ,plavix 75, Metoprolol XL 25mg qd  - c/w home statin and Jardiance    Active or Pending Issues Requiring Follow-up:  - f/u cardiology within 1 week  - f/u PCP within 2 weeks     Advanced Directives:   [ x ] Full code  [ ] DNR  [ ] Hospice       54 yo male with history for DM, HTN, HLD, and triple vessel CAD who presented to the ED with SOB, chest pain, and bilateral leg swelling. Patient had a CT angio cardiac on 07/26/24 that showed triple vessel disease. Pt was scheduled for a cardiac cath 09/06 prior to presentation. In ED, given furosemide 40mg IV and responded well with dec SOB. Noted to have elevated troponin, peaking in 200s, without EKG changes so cardiology consulted. Continued on IV lasix 40mg with improvement of symptoms. Cardiology recommended hep gtt and ASA load for [facility] 9/3, where PCI was placed in LAD, also notable for 100% occluded RCA. TTE wnl. Cardiology ok with dc without diuretics and follow up within a week of discharge. On  9/4 patient's  reported that patient had swelling of the upper lip and was given benadryl, famotidine, and prednisone given prior history of delayed contrast reaction. Patient was examined the next say and doing well with no     The patient is afebrile, hemodynamically stable and medically optimized for discharge to home with follow up with cardiology within 1 week and PCP in 2 weeks. On day of discharge, patient is clinically stable with no new exam findings or acute symptoms compared to prior. The patient was seen by the attending physician on the date of discharge and deemed stable and acceptable for discharge. The patient's chronic medical conditions were treated accordingly per the patient's home medication regimen. The patient's medication reconciliation (with changes made to chronic medications), follow up appointments, discharge orders, instructions, and significant lab and diagnostic studies are as noted.    Important Medication Changes and Reason:  - started on ASA 81mg ,plavix 75, Metoprolol XL 25mg qd  - c/w home statin, Jardiance, and metformin    Active or Pending Issues Requiring Follow-up:  - f/u cardiology within 1 week  - f/u PCP within 2 weeks     Advanced Directives:   [ x ] Full code  [ ] DNR  [ ] Hospice

## 2024-09-04 NOTE — PROGRESS NOTE ADULT - SUBJECTIVE AND OBJECTIVE BOX
Patient seen and examined at bedside.    Overnight Events: s/p [facility]    REVIEW OF SYSTEMS:  CONSTITUTIONAL: No weakness, fevers or chills  EYES/ENT: No visual changes;  No dysphagia  NECK: No pain or stiffness  RESPIRATORY: No cough, wheezing, hemoptysis; No shortness of breath  CARDIOVASCULAR: No chest pain or palpitations; No lower extremity edema  GASTROINTESTINAL: No abdominal or epigastric pain. No nausea, vomiting, or hematemesis; No diarrhea or constipation. No melena or hematochezia.  BACK: No back pain  GENITOURINARY: No dysuria, frequency or hematuria  NEUROLOGICAL: No numbness or weakness  SKIN: No itching, burning, rashes, or lesions   All other review of systems is negative unless indicated above.            Current Meds:  aspirin enteric coated 81 milliGRAM(s) Oral daily  atorvastatin 80 milliGRAM(s) Oral at bedtime  clopidogrel Tablet 75 milliGRAM(s) Oral daily  dextrose 5%. 1000 milliLiter(s) IV Continuous <Continuous>  dextrose 5%. 1000 milliLiter(s) IV Continuous <Continuous>  dextrose 50% Injectable 25 Gram(s) IV Push once  dextrose 50% Injectable 25 Gram(s) IV Push once  dextrose 50% Injectable 12.5 Gram(s) IV Push once  dextrose Oral Gel 15 Gram(s) Oral once  furosemide   Injectable 40 milliGRAM(s) IV Push daily  glucagon  Injectable 1 milliGRAM(s) IntraMuscular once  guaiFENesin Oral Liquid (Sugar-Free) 200 milliGRAM(s) Oral every 6 hours PRN  insulin lispro (ADMELOG) corrective regimen sliding scale   SubCutaneous three times a day before meals  insulin lispro (ADMELOG) corrective regimen sliding scale   SubCutaneous at bedtime  metoprolol succinate ER 25 milliGRAM(s) Oral daily      Vitals:  T(F): 98.5 (09-04), Max: 98.5 (09-04)  HR: 91 (09-04) (69 - 91)  BP: 109/78 (09-04) (109/78 - 156/85)  RR: 18 (09-04)  SpO2: 97% (09-04)  I&O's Summary    02 Sep 2024 07:01  -  03 Sep 2024 07:00  --------------------------------------------------------  IN: 345 mL / OUT: 1680 mL / NET: -1335 mL    03 Sep 2024 07:01  -  04 Sep 2024 06:43  --------------------------------------------------------  IN: 960 mL / OUT: 500 mL / NET: 460 mL        Physical Exam:  GEN: NAD  HEENT: EOMI, clear sclera  PULM: CTA b/l, no wheeze  CV: RRR S1 S2, no murmur, no JVD  ABD: S, NT, ND  EXT: WWP, no edema  PSYCH: normal affect  SKIN: No rash                          12.0   5.32  )-----------( 255      ( 03 Sep 2024 05:35 )             35.7     09-03    135  |  104  |  18  ----------------------------<  195<H>  4.9   |  20<L>  |  0.68    Ca    8.7      03 Sep 2024 05:34  Phos  3.5     09-03  Mg     2.5     09-03    TPro  7.0  /  Alb  3.5  /  TBili  0.3  /  DBili  x   /  AST  30  /  ALT  67<H>  /  AlkPhos  136<H>  09-03    PT/INR - ( 03 Sep 2024 05:34 )   PT: 11.6 sec;   INR: 1.11 ratio         PTT - ( 03 Sep 2024 13:11 )  PTT:37.6 sec  CARDIAC MARKERS ( 03 Sep 2024 05:36 )  x     / x     / x     / x     / 2.3 ng/mL  CARDIAC MARKERS ( 02 Sep 2024 22:33 )  x     / x     / x     / x     / 2.9 ng/mL  CARDIAC MARKERS ( 02 Sep 2024 18:01 )  x     / x     / x     / x     / 3.2 ng/mL

## 2024-09-04 NOTE — DISCHARGE NOTE NURSING/CASE MANAGEMENT/SOCIAL WORK - PATIENT PORTAL LINK FT
You can access the FollowMyHealth Patient Portal offered by [facility] by registering at the following website: [email or web address]. By joining [facility]’s FollowMyHealth portal, you will also be able to view your health information using other applications (apps) compatible with our system.

## 2024-09-04 NOTE — DISCHARGE NOTE PROVIDER - NSDCFUSCHEDAPPT_GEN_ALL_CORE_FT
Cristian Felder  [facility]  Scheduled Appointment: 09/06/2024    [facility]   Scheduled Appointment: 09/13/2024    [facility]  [facility]   Scheduled Appointment: 10/28/2024    Anabela Mckenzie  [facility]  OTOLARYNG  [facility]  Scheduled Appointment: 11/18/2024

## 2024-09-04 NOTE — DISCHARGE NOTE PROVIDER - NSDCDCMDCOMP_GEN_ALL_CORE
TRANSFER - OUT REPORT:    Verbal report given to [address] on [address]  being transferred to [facility] for routine progression of care       Report consisted of patients Situation, Background, Assessment and   Recommendations(SBAR). Information from the following report(s) SBAR, ED Summary, [facility] and Recent Results was reviewed with the receiving nurse. Lines:   Peripheral IV 09/09/19 Right Hand (Active)   Site Assessment Clean, dry, & intact 9/9/2019  3:02 PM   Phlebitis Assessment 0 9/9/2019  3:02 PM   Infiltration Assessment 0 9/9/2019  3:02 PM   Dressing Status Clean, dry, & intact 9/9/2019  3:02 PM        Opportunity for questions and clarification was provided.       Patient transported with:   [facility] This document is complete and the patient is ready for discharge.

## 2024-09-04 NOTE — DISCHARGE NOTE PROVIDER - NSDCFUADDAPPT_GEN_ALL_CORE_FT
APPTS READY TO BE MADE [X]    Best Family or Patient Contact (if needed):    Additional Information about above appointments (if needed):    1: Cardiology within 1 week  2: PCP within 2 weeks  3:     Other comments or requests:

## 2024-09-04 NOTE — PROGRESS NOTE ADULT - PROBLEM SELECTOR PLAN 6
-Prescribed losartan outpatient, but patient is not adherent  -low threshold to resume inpatient -Prescribed losartan outpatient, but patient is not adherent  -low threshold to resume inpatient  -c/w Toprol 25

## 2024-09-04 NOTE — DISCHARGE NOTE PROVIDER - CARE PROVIDER_API CALL
Tere Pemberton  Internal Medicine  [address], [address]  [place] [address]  Phone: [phone]  Fax: [phone]  Established Patient  Follow Up Time: 2 weeks

## 2024-09-05 VITALS — WEIGHT: 152.56 LBS

## 2024-09-05 LAB
GLUCOSE BLDC GLUCOMTR-MCNC: 145 MG/DL — HIGH (ref 70–99)
LIDOCAIN SERPL-MCNC: <9 NMOL/L — SIGNIFICANT CHANGE UP

## 2024-09-05 PROCEDURE — C1725: CPT

## 2024-09-05 PROCEDURE — 80061 LIPID PANEL: CPT

## 2024-09-05 PROCEDURE — 83735 ASSAY OF MAGNESIUM: CPT

## 2024-09-05 PROCEDURE — 80048 BASIC METABOLIC PNL TOTAL CA: CPT

## 2024-09-05 PROCEDURE — C1753: CPT

## 2024-09-05 PROCEDURE — 86901 BLOOD TYPING SEROLOGIC RH(D): CPT

## 2024-09-05 PROCEDURE — C1874: CPT

## 2024-09-05 PROCEDURE — 87637 SARSCOV2&INF A&B&RSV AMP PRB: CPT

## 2024-09-05 PROCEDURE — 92978 ENDOLUMINL IVUS OCT C 1ST: CPT | Mod: LD

## 2024-09-05 PROCEDURE — 99231 SBSQ HOSP IP/OBS SF/LOW 25: CPT

## 2024-09-05 PROCEDURE — 84100 ASSAY OF PHOSPHORUS: CPT

## 2024-09-05 PROCEDURE — 84156 ASSAY OF PROTEIN URINE: CPT

## 2024-09-05 PROCEDURE — 71046 X-RAY EXAM CHEST 2 VIEWS: CPT

## 2024-09-05 PROCEDURE — C1894: CPT

## 2024-09-05 PROCEDURE — 82962 GLUCOSE BLOOD TEST: CPT

## 2024-09-05 PROCEDURE — 82553 CREATINE MB FRACTION: CPT

## 2024-09-05 PROCEDURE — 96375 TX/PRO/DX INJ NEW DRUG ADDON: CPT

## 2024-09-05 PROCEDURE — 0225U NFCT DS DNA&RNA 21 SARSCOV2: CPT

## 2024-09-05 PROCEDURE — 82570 ASSAY OF URINE CREATININE: CPT

## 2024-09-05 PROCEDURE — 83036 HEMOGLOBIN GLYCOSYLATED A1C: CPT

## 2024-09-05 PROCEDURE — 85610 PROTHROMBIN TIME: CPT

## 2024-09-05 PROCEDURE — 96374 THER/PROPH/DIAG INJ IV PUSH: CPT

## 2024-09-05 PROCEDURE — 99232 SBSQ HOSP IP/OBS MODERATE 35: CPT

## 2024-09-05 PROCEDURE — 86850 RBC ANTIBODY SCREEN: CPT

## 2024-09-05 PROCEDURE — 87449 NOS EACH ORGANISM AG IA: CPT

## 2024-09-05 PROCEDURE — 83880 ASSAY OF NATRIURETIC PEPTIDE: CPT

## 2024-09-05 PROCEDURE — 83935 ASSAY OF URINE OSMOLALITY: CPT

## 2024-09-05 PROCEDURE — 85027 COMPLETE CBC AUTOMATED: CPT

## 2024-09-05 PROCEDURE — 85025 COMPLETE CBC W/AUTO DIFF WBC: CPT

## 2024-09-05 PROCEDURE — 99232 SBSQ HOSP IP/OBS MODERATE 35: CPT | Mod: GC

## 2024-09-05 PROCEDURE — 99285 EMERGENCY DEPT VISIT HI MDM: CPT | Mod: 25

## 2024-09-05 PROCEDURE — 93356 MYOCRD STRAIN IMG SPCKL TRCK: CPT

## 2024-09-05 PROCEDURE — 84133 ASSAY OF URINE POTASSIUM: CPT

## 2024-09-05 PROCEDURE — 93458 L HRT ARTERY/VENTRICLE ANGIO: CPT | Mod: 59

## 2024-09-05 PROCEDURE — 84300 ASSAY OF URINE SODIUM: CPT

## 2024-09-05 PROCEDURE — 36415 COLL VENOUS BLD VENIPUNCTURE: CPT

## 2024-09-05 PROCEDURE — C1887: CPT

## 2024-09-05 PROCEDURE — 84484 ASSAY OF TROPONIN QUANT: CPT

## 2024-09-05 PROCEDURE — 84540 ASSAY OF URINE/UREA-N: CPT

## 2024-09-05 PROCEDURE — 85730 THROMBOPLASTIN TIME PARTIAL: CPT

## 2024-09-05 PROCEDURE — 93306 TTE W/DOPPLER COMPLETE: CPT

## 2024-09-05 PROCEDURE — 80053 COMPREHEN METABOLIC PANEL: CPT

## 2024-09-05 PROCEDURE — 93005 ELECTROCARDIOGRAM TRACING: CPT

## 2024-09-05 PROCEDURE — 83695 ASSAY OF LIPOPROTEIN(A): CPT

## 2024-09-05 PROCEDURE — C9600: CPT | Mod: LD

## 2024-09-05 PROCEDURE — C1769: CPT

## 2024-09-05 PROCEDURE — 81001 URINALYSIS AUTO W/SCOPE: CPT

## 2024-09-05 PROCEDURE — 86900 BLOOD TYPING SEROLOGIC ABO: CPT

## 2024-09-05 PROCEDURE — 83690 ASSAY OF LIPASE: CPT

## 2024-09-05 RX ADMIN — Medication 81 MILLIGRAM(S): at 11:49

## 2024-09-05 RX ADMIN — METOPROLOL TARTRATE 25 MILLIGRAM(S): 100 TABLET ORAL at 06:22

## 2024-09-05 RX ADMIN — Medication 75 MILLIGRAM(S): at 11:48

## 2024-09-05 RX ADMIN — Medication 5000 UNIT(S): at 06:22

## 2024-09-05 NOTE — DIETITIAN INITIAL EVALUATION ADULT - PROBLEM SELECTOR PLAN 5
History of triglyceridemia and hyperlipidemia with chronic pancreatitis 2/2 hypertriglyceridemia  lipase: 23  check triglycerides in the morning

## 2024-09-05 NOTE — DIETITIAN INITIAL EVALUATION ADULT - PERTINENT LABORATORY DATA
09-04    133<L>  |  102  |  28<H>  ----------------------------<  196<H>  5.0   |  21<L>  |  0.93    Ca    8.7      04 Sep 2024 06:53  Phos  4.2     09-04  Mg     2.5     09-04    POCT Blood Glucose.: 145 mg/dL (09-05-24 @ 08:39)  A1C with Estimated Average Glucose Result: 8.1 % (09-03-24 @ 05:35)

## 2024-09-05 NOTE — PROGRESS NOTE ADULT - PROBLEM SELECTOR PLAN 6
-Prescribed losartan outpatient, but patient is not adherent  -low threshold to resume inpatient  -c/w Toprol 25

## 2024-09-05 NOTE — DIETITIAN INITIAL EVALUATION ADULT - PROBLEM SELECTOR PLAN 1
Presenting after two days of shortness of breath. Crackles in the lower lung fields bilaterally. Concern for pulm edema 2/2 cardiac etiology vs infectious. Furosemide 40mg in ED    - Currently on RA  - C/w furosemide 40mg BID  - F/u Legionella  - I/Os  - daily Weights  - F/u echo

## 2024-09-05 NOTE — PROGRESS NOTE ADULT - SUBJECTIVE AND OBJECTIVE BOX
[facility] INVASIVE CARDIOLOGY- (Brian, Leo, Emerita, Ana, Sherly, Ricardo, Hattie, Ilan, Herber, Wallace)   CARDIAC CATH LAB, [facility] TEAM   [phone]      CHIEF COMPLAINT: Patient is a 53y old  Male who presents with a chief complaint of SOB (05 Sep 2024 07:11)      HPI:  Pt is a 54 yo male with history for DM, HTN, HLD, and triple vessel CAD who presented to the ED with SOB, chest pain, and bilateral leg swelling. Patient shared that he has had leg swelling for two week that has been worsening. Subjective fevers over the last few days and patient has developed a cough, but reports no mucous/sputum. When he coughs he has pain on the left side of the chest radiating to the back. Patient had a CT angio cardiac on 24 that showed triple vessel disease. Pt was scheduled for a cardiac cath  prior to presentation. In ED, given furosemide 40mg and patient reports increased urination and decreased leg swelling. Pt is diuretic naive. (02 Sep 2024 09:26)        Subjective/Observations: patient seen and examined.  denies chest pain, dyspena, dizziness, palpitations, N&V, HA      Review of Systems all WNL except below indicated:    Constitutional: [ ] Fever [ ] Chills [ ] Fatigue [ ] Weight change   HEENT: [ ] Blurred vision [ ] Eye Pain [ ] Headache [ ] Runny nose [ ] Sore Throat   Respiratory: [ ] Cough [ ] Wheezing [ ] Shortness of breath  Cardiovascular: [ ] Chest Pain [ ] Palpitations [ ] MORTON [ ] PND [ ] Orthopnea  Gastrointestinal: [ ] Abdominal Pain [ ] Diarrhea [ ] Constipation [ ] Hemorrhoids [ ] Nausea [ ] Vomiting  Genitourinary: [ ] Nocturia [ ] Dysuria [ ] Incontinence  Extremities: [ ] Swelling [ ] Joint Pain  Neurologic: [ ] Focal deficit [ ] Paresthesias [ ] Syncope  Lymphatic: [ ] Swelling [ ] Lymphadenopathy   Skin: [ ] Rash [ ] Ecchymoses [ ] Wounds [ ] Lesions  Psychiatry: [ ] Depression [ ] Suicidal/Homicidal Ideation [ ] Anxiety [ ] Sleep Disturbances  [ ] 10 point review of systems is otherwise negative except as mentioned above            [ ]Unable to obtain    PAST MEDICAL & SURGICAL HISTORY:  Diabetes      CAD (coronary artery disease)      MEDICATIONS  (STANDING):  aspirin enteric coated 81 milliGRAM(s) Oral daily  atorvastatin 80 milliGRAM(s) Oral at bedtime  clopidogrel Tablet 75 milliGRAM(s) Oral daily  dextrose 5%. 1000 milliLiter(s) (100 mL/Hr) IV Continuous <Continuous>  dextrose 5%. 1000 milliLiter(s) (50 mL/Hr) IV Continuous <Continuous>  dextrose 50% Injectable 25 Gram(s) IV Push once  dextrose 50% Injectable 25 Gram(s) IV Push once  dextrose 50% Injectable 12.5 Gram(s) IV Push once  dextrose Oral Gel 15 Gram(s) Oral once  glucagon  Injectable 1 milliGRAM(s) IntraMuscular once  heparin   Injectable 5000 Unit(s) SubCutaneous every 8 hours  insulin lispro (ADMELOG) corrective regimen sliding scale   SubCutaneous three times a day before meals  insulin lispro (ADMELOG) corrective regimen sliding scale   SubCutaneous at bedtime  metoprolol succinate ER 25 milliGRAM(s) Oral daily    MEDICATIONS  (PRN):  guaiFENesin Oral Liquid (Sugar-Free) 200 milliGRAM(s) Oral every 6 hours PRN Cough      Allergies    contrast media (iodine-based) (Nausea; Rash)    Intolerances          Vital Signs Last 24 Hrs  T(C): 36.8 (05 Sep 2024 04:34), Max: 36.8 (05 Sep 2024 04:34)  T(F): 98.2 (05 Sep 2024 04:34), Max: 98.2 (05 Sep 2024 04:34)  HR: 73 (05 Sep 2024 04:34) (60 - 75)  BP: 136/74 (05 Sep 2024 04:34) (129/77 - 143/72)  BP(mean): --  RR: 18 (05 Sep 2024 04:34) (18 - 18)  SpO2: 95% (05 Sep 2024 04:34) (94% - 96%)    Parameters below as of 05 Sep 2024 04:34  Patient On (Oxygen Delivery Method): room air        I&O's Summary    04 Sep 2024 07:01  -  05 Sep 2024 07:00  --------------------------------------------------------  IN: 0 mL / OUT: 1000 mL / NET: -1000 mL          FOCUSED PHYSICAL EXAM:  Pulmonary: Non-labored, breath sounds are clear bilaterally, No wheezing, rales or rhonchi  Cardiovascular: Regular, S1 and S2, No murmurs, rubs, gallops or clicks  cath site: ( groin/radial)  stable w/o bleeding or hematoma, soft, + pulses     LABS: All Labs Reviewed:                        10.9   6.89  )-----------( 267      ( 04 Sep 2024 06:50 )             32.9                         12.0   5.32  )-----------( 255      ( 03 Sep 2024 05:35 )             35.7                         11.2   5.25  )-----------( 220      ( 02 Sep 2024 22:33 )             33.9     04 Sep 2024 06:53    133    |  102    |  28     ----------------------------<  196    5.0     |  21     |  0.93   03 Sep 2024 05:34    135    |  104    |  18     ----------------------------<  195    4.9     |  20     |  0.68   02 Sep 2024 18:01    133    |  100    |  18     ----------------------------<  121    4.7     |  22     |  0.77     Ca    8.7        04 Sep 2024 06:53  Ca    8.7        03 Sep 2024 05:34  Ca    8.3        02 Sep 2024 18:01  Phos  4.2       04 Sep 2024 06:53  Phos  3.5       03 Sep 2024 05:34  Phos  4.2       02 Sep 2024 18:01  Mg     2.5       04 Sep 2024 06:53  Mg     2.5       03 Sep 2024 05:34  Mg     2.4       02 Sep 2024 18:01    TPro  7.0    /  Alb  3.5    /  TBili  0.3    /  DBili  x      /  AST  30     /  ALT  67     /  AlkPhos  136    03 Sep 2024 05:34    PTT - ( 03 Sep 2024 13:11 )  PTT:37.6 sec          RESULTS:  TELE intepretation: SR @ 80's   ECG: SR @ 56 bpm  ECHO:  < from: TTE Limited W or WO Ultrasound Enhancing Agent (24 @ 08:26) >  CONCLUSIONS:      1. Left ventricular cavity is normal in size. Left ventricular wall thickness is normal. Left ventricular systolic function is normal with an ejection fraction of 69 % by Sanders's method of disks. There are no regional wall motion abnormalities seen.   2. Left ventricular global longitudinal strain is -22.9 % which is normal (< -18%). Images were acquired on a [employer] ultrasound system and processed using [place] strain analysis software with a heart rate of 63 bpm and a blood pressure of 109/78 mmHg.   3. The left ventricular diastolic function is indeterminate, withindeterminate left ventricular filling pressure.   4. Normal right ventricular cavity size, with normal wall thickness, and normal right ventricular systolic function.   5. Moderate mitral regurgitation.   6. Estimated pulmonary artery systolic pressure is 32 mmHg.   7. Compared to the transthoracic echocardiogram performed on 2009, the degree of MR has increased.    < end of copied text >    STRESS TEST:  < from: Cardiac Treadmill Stress Test (Non Imaging).. (24 @ 00:00) >  ---------------------------------------------------------------------------------------------------------------------------------------------------------  Conclusions:   1. The ECG is positive for ischemia.   2. The patient underwent stress testing using the standard [place] protocol.      _ The patient exercised for 12 min 19 sec.      _ The test was stopped due to fatigue.      _ The peak heart rate was 141 bpm; 85 % of predicted maximal heart ratefor this patient.   3. Baseline electrocardiogram: normal sinus rhythm at a rate of 81 bpm with nonspecific ST-T wave abnormalities and poor R wave progression.   4. Electrocardiogram ischemic changes: There was approximately 2 mm ST segment elevation in leads aVR and V1 starting at 8:50 min of exercise at 130 bpm and persisting 6:50 min in recovery. There was approximately 2 mm horizontal ST segment depression in leads I, II, aVL, V2, and V4-V6 starting at 8:50 min of exercise and persisting 6:50 min in recovery.   5. Arrhythmias: No arrhythmia associated with stress.    < end of copied text >    CT CORONARIES:  CATH REPORT:  < from: Cardiac Catheterization (24 @ 18:06) >    Study Date:     2024   Name:           RAMO SHERWOOD   :            1970   (53 years)   Gender:         male   MR#:            [id number]   MPI#:           [id number]   Patient Class:  Inpatient     Cath Lab Report    Diagnostic Cardiologist:       Mustapha Gonsalez MD   Interventional Cardiologist:   Mustapha Gonsalez MD   Fellow:                        Maddison Raman MD   Referring Physician:           Simone Jones MD     Procedures Performed   Procedures:   1.    Arterial Access - Right Radial     2.    Diagnostic Coronary Angiography   3.    Left Heart Cath   4.    IVUS   5.    PCI: GUNNAR     Indications:                Myocardial infarction without ST elevation  (NSTEMI)  Lab Visit Indication:      NSTEMI   PCI Status:                elective     Conclusions:     Severe two vessel obstructive coronary artery disease   --Left anterior descending artery   --Right coronary artery   Chronic total occlusion of the right coronary artery   --Left to right, Rentrop grade 3 collateral filling   Mild left main coronary artery disease   Co-dominant system   LVEDP = 22mmHg     Status post angioplasty, IVUS and stenting (drug-eluting/[employer]  [place]) of the left anterior descending artery with resultant  AMY 3 flow     < end of copied text >

## 2024-09-05 NOTE — PROGRESS NOTE ADULT - PROBLEM SELECTOR PLAN 2
Left sided chest pain beginning two days ago that radiates to the back. Crackles on lower lung fields bilterally. Concern for ACS vs pancreatitis vs CHF. Was scheduled for an outpatient angio/cath on 9/6 because of triple vessel disease on cardiac CT, but presented to the ED prior to going for the procedure  HS Trop: 107 --> 132--> 168 --> 206 --> 188 --> 115  CKMB:                                        3.2 --> 2.9 --> 2.3  EKG showed no ST changes  proBNP: 2207  Xray - No focal consolidation. Compatible with moderate pulmonary edema.  RVP: Entero/rhinovirus detected  No leukocytosis 9/2-9/4  TTE - EF 69%, LA moderately dilated     Loaded with  9/2 followed by daily ASA 81  stop lasix 50 mg qd  C/w atorvastatin 80 qhs  C/w guaifenesin prn for cough  f/u lipid panel, a1c, lipoprotein a  s/p LHC 9/3 with LAD stent place, serial lesions, 100% small RCA   no diureses need on d/c per cardiology

## 2024-09-05 NOTE — PROGRESS NOTE ADULT - SUBJECTIVE AND OBJECTIVE BOX
PROGRESS NOTE    RAMO SHERWOOD ([id number])- Patient is a 53y old  Male who presents with a chief complaint of SOB (05 Sep 2024 08:22)      INTERVAL HPI/OVERNIGHT EVENTS:   Patient's  reported upper lip swelling so patient stayed last night. Pt was given benadryl, famotidine, and prednisone. Upper lip looks good on exam today    SUBJECTIVE: Pt seen at bedside; denies n/v, sob, chest pain. Shares that lip swelling went down and he is feeling well     MEDICATIONS:  aspirin enteric coated 81 milliGRAM(s) Oral daily  atorvastatin 80 milliGRAM(s) Oral at bedtime  clopidogrel Tablet 75 milliGRAM(s) Oral daily  dextrose 5%. 1000 milliLiter(s) IV Continuous <Continuous>  dextrose 5%. 1000 milliLiter(s) IV Continuous <Continuous>  dextrose 50% Injectable 25 Gram(s) IV Push once  dextrose 50% Injectable 25 Gram(s) IV Push once  dextrose 50% Injectable 12.5 Gram(s) IV Push once  dextrose Oral Gel 15 Gram(s) Oral once  glucagon  Injectable 1 milliGRAM(s) IntraMuscular once  guaiFENesin Oral Liquid (Sugar-Free) 200 milliGRAM(s) Oral every 6 hours PRN  heparin   Injectable 5000 Unit(s) SubCutaneous every 8 hours  insulin lispro (ADMELOG) corrective regimen sliding scale   SubCutaneous three times a day before meals  insulin lispro (ADMELOG) corrective regimen sliding scale   SubCutaneous at bedtime  metoprolol succinate ER 25 milliGRAM(s) Oral daily      PHYSICAL EXAM:  Vital Signs Last 24 Hrs  T(C): 36.8 (05 Sep 2024 04:34), Max: 36.8 (05 Sep 2024 04:34)  T(F): 98.2 (05 Sep 2024 04:34), Max: 98.2 (05 Sep 2024 04:34)  HR: 73 (05 Sep 2024 04:34) (60 - 75)  BP: 136/74 (05 Sep 2024 04:34) (129/77 - 143/72)  BP(mean): --  RR: 18 (05 Sep 2024 04:34) (18 - 18)  SpO2: 95% (05 Sep 2024 04:34) (94% - 96%)    Parameters below as of 05 Sep 2024 04:34  Patient On (Oxygen Delivery Method): room air        GENERAL: NAD, lying in bed comfortably  HEAD:  Atraumatic, Normocephalic, no lip swelling  EYES: EOMI, PERRLA. No scleral incterus and no conjunctival injection. Tracks me in room  ENT: Moist mucous membranes  NECK: Supple, No JVD  CHEST/LUNG: Clear to auscultation bilaterally; No rales, rhonchi, wheezing, or rubs. Unlabored respirations  HEART: Regular rate and rhythm; No murmurs, rubs, or gallops  ABDOMEN: BS +; Soft, nontender, nondistended  EXTREMITIES:  No LE edema, +2+ Peripheral Pulses, brisk capillary refill. No clubbing, cyanosis, or edema  NERVOUS SYSTEM:  A&Ox3, no focal neurological deficits. CN II-XII grossly intact, but not individually tested.  PSYCHIATRIC: Cooperative. Appropriate mood and affect.  SKIN: No rashes or lesions        LABS:                          10.9   6.89  )-----------( 267      ( 04 Sep 2024 06:50 )             32.9     09-04    133<L>  |  102  |  28<H>  ----------------------------<  196<H>  5.0   |  21<L>  |  0.93    Ca    8.7      04 Sep 2024 06:53  Phos  4.2     09-04  Mg     2.5     09-04            Urinalysis Basic - ( 04 Sep 2024 06:53 )    Color: x / Appearance: x / SG: x / pH: x  Gluc: 196 mg/dL / Ketone: x  / Bili: x / Urobili: x   Blood: x / Protein: x / Nitrite: x   Leuk Esterase: x / RBC: x / WBC x   Sq Epi: x / Non Sq Epi: x / Bacteria: x      PTT - ( 03 Sep 2024 13:11 )  PTT:37.6 sec    CAPILLARY BLOOD GLUCOSE  POCT Blood Glucose.: 145 mg/dL (05 Sep 2024 08:39)          RADIOLOGY & ADDITIONAL TESTS were viewed by me personally.

## 2024-09-05 NOTE — DIETITIAN INITIAL EVALUATION ADULT - ENERGY INTAKE
Pt reports appetite has returned and is eating well. Feels that he is not receiving enough foods and states he thinks he eats better at home than he does in the hospital. Food preferences reviewed, will honor as able. Per RN flowsheets, Pt consuming % of most meals; consumed 100% of breakfast today. Diabetic diet education provided and Pt receptive. Labs reviewed, hyponatremia noted. Adequate (%)

## 2024-09-05 NOTE — PROGRESS NOTE ADULT - SUBJECTIVE AND OBJECTIVE BOX
Overnight Events: No overnight events. Reports mild lip swelling but no sob or wheezing. RRA site hematoma imporved. no pain. still has numbness right hand which is improving. strength equal b/l. counselled regarding compliance with medications in detail.     Review Of Systems: No chest pain, shortness of breath, or palpitations            Current Meds:  aspirin enteric coated 81 milliGRAM(s) Oral daily  atorvastatin 80 milliGRAM(s) Oral at bedtime  clopidogrel Tablet 75 milliGRAM(s) Oral daily  dextrose 5%. 1000 milliLiter(s) IV Continuous <Continuous>  dextrose 5%. 1000 milliLiter(s) IV Continuous <Continuous>  dextrose 50% Injectable 25 Gram(s) IV Push once  dextrose 50% Injectable 25 Gram(s) IV Push once  dextrose 50% Injectable 12.5 Gram(s) IV Push once  dextrose Oral Gel 15 Gram(s) Oral once  glucagon  Injectable 1 milliGRAM(s) IntraMuscular once  guaiFENesin Oral Liquid (Sugar-Free) 200 milliGRAM(s) Oral every 6 hours PRN  heparin   Injectable 5000 Unit(s) SubCutaneous every 8 hours  insulin lispro (ADMELOG) corrective regimen sliding scale   SubCutaneous three times a day before meals  insulin lispro (ADMELOG) corrective regimen sliding scale   SubCutaneous at bedtime  metoprolol succinate ER 25 milliGRAM(s) Oral daily      Vitals:  T(F): 98.2 (09-05), Max: 98.2 (09-05)  HR: 73 (09-05) (60 - 75)  BP: 136/74 (09-05) (129/77 - 143/72)  RR: 18 (09-05)  SpO2: 95% (09-05)  I&O's Summary    04 Sep 2024 07:01  -  05 Sep 2024 07:00  --------------------------------------------------------  IN: 0 mL / OUT: 1000 mL / NET: -1000 mL    05 Sep 2024 07:01  -  05 Sep 2024 11:25  --------------------------------------------------------  IN: 360 mL / OUT: 900 mL / NET: -540 mL        Physical Exam:  GEN: comfortable appearing, lying in bed in NAD  HEENT: NCAT, MMM  CV: Regular S1, S2, no m/r/g  RESP: CTAB  ABD: Soft, NTND, +BS  EXT: No LE edema, still has numbness right hand which is improving. strength equal b/l. hematoma improved.  NEURO: No focal deficits, AOx3  SKIN:  No rashes                          10.9   6.89  )-----------( 267      ( 04 Sep 2024 06:50 )             32.9     09-04    133<L>  |  102  |  28<H>  ----------------------------<  196<H>  5.0   |  21<L>  |  0.93    Ca    8.7      04 Sep 2024 06:53  Phos  4.2     09-04  Mg     2.5     09-04      PTT - ( 03 Sep 2024 13:11 )  PTT:37.6 sec  CARDIAC MARKERS ( 03 Sep 2024 05:36 )  115 ng/L / x     / x     / x     / x     / 2.3 ng/mL  CARDIAC MARKERS ( 02 Sep 2024 22:33 )  188 ng/L / x     / x     / x     / x     / 2.9 ng/mL  CARDIAC MARKERS ( 02 Sep 2024 18:01 )  206 ng/L / x     / x     / x     / x     / 3.2 ng/mL  CARDIAC MARKERS ( 02 Sep 2024 12:16 )  168 ng/L / x     / x     / x     / x     / x      CARDIAC MARKERS ( 02 Sep 2024 06:58 )  132 ng/L / x     / x     / x     / x     / x

## 2024-09-05 NOTE — PROGRESS NOTE ADULT - PROBLEM SELECTOR PLAN 1
Presenting after two days of shortness of breath. Crackles in the lower lung fields bilaterally. Concern for pulm edema 2/2 cardiac etiology vs infectious. Furosemide 40mg in ED    - Currently on RA  - Legionella urine negative  - I/Os  - daily Weights  - TTE - EF 69%, LA moderately dilated   - stop furosemide 40mg qd

## 2024-09-05 NOTE — DIETITIAN INITIAL EVALUATION ADULT - NSFNSGIIOFT_GEN_A_CORE
Pt denies any N/V. Reports some diarrhea today 9/5. No recent BMs documented on RN flowsheets. Pt not on bowel regimen.

## 2024-09-05 NOTE — DIETITIAN INITIAL EVALUATION ADULT - REASON FOR ADMISSION
Per chart, Pt is a 52 y/o M with PMH: "DM2, previous Hepatitis B, chronic pancreatitis, and HTN presenting with SOB with left chest pain. Pt had a CT cardiac on July 26th showing triple vessel disease and was scheduled for a cardiac cath on 9/6 prior to presentation to the ED. Concern for ACS vs CHF exacerbation." Pt s/p PCI 9/3.

## 2024-09-05 NOTE — PROGRESS NOTE ADULT - ATTENDING COMMENTS
53M with CAD (s/p CT coronary in July revealing triple vessel disease, scheduled for PCI on 9/6), DM2, hx hepatitis B, chronic pancreatitis, NAFLD, and HTN presenting with volume overload and chest pain and found to have rising troponin.    #NSTEMI, CAD  s/p heparin gtt. s/p PCI/IVUS GUNNAR x 2 to LAD via RRA on 9/3/24  DAPT. lipitor. started toprolxl 25 per cards recs    #Volume overload  Possibly related to heart failure; cannot find an echo in the system  - s/p IV lasix. Echo done: LVEF 69%, LV diastolic function is indeterminate, normal R ventricular systolic function, moderate MR, discussed with cardiology team, patient now euvolemic, plan to discharge without diuretic for now and patient to follow up in cards clinic within a week. monitor BMP,  monitor I/O, daily weights    #Hx of hypertriglyceridemia induced pancreatitis  #HLD  - f/u lipid panel  - continue home statin  - previously on gemfibrozil, hasn't taken in some time    #NAFLD  #Transaminase elevation  - trend LFTs; previously seen by hepatology, thought to be related to metabolic dysfunction and/or hx of hepatitis B virus    #T2DM  - holding home metformin, jardiance inpatient.  - ISS qac and qhs    #DVT ppx - HSQ    patient did not leave yesterday as he complained of lip swelling, received benadryl, pepcid, prednisone. exam today unremarkable, patient sitting and talking without any distress, satting well on RA. VSS.   discharge planning 55 mins , patient cleared by cardiology team for discharge, needs close outpatient follow in internal med residents clinic and cardiology fellows clinic within 1 week of hospital discharge. f/u FABIO regarding medication prescriptions.

## 2024-09-05 NOTE — PROGRESS NOTE ADULT - PROVIDER SPECIALTY LIST ADULT
Cardiology
Cardiology
Intervent Cardiology
Intervent Cardiology
Cardiology
Internal Medicine

## 2024-09-05 NOTE — DIETITIAN INITIAL EVALUATION ADULT - NS FNS DIET ORDER
Diet, Regular:   Consistent Carbohydrate {No Snacks} (CSTCHO)  DASH/TLC {Sodium & Cholesterol Restricted} (DASH)  Low Fat (LOWFAT) (09-02-24 @ 15:08) [Active]

## 2024-09-05 NOTE — DIETITIAN INITIAL EVALUATION ADULT - NUTRITIONGOAL OUTCOME1
Pt will be able to provide 2 teach-back points and blood sugars will be better controlled with a downtrending HbA1c

## 2024-09-05 NOTE — DIETITIAN INITIAL EVALUATION ADULT - REASON INDICATOR FOR ASSESSMENT
nutrition consult received for HbA1c 8.1%  Pt Syriac speaking - language line  ID#[id number], [place] used for interview

## 2024-09-05 NOTE — DIETITIAN INITIAL EVALUATION ADULT - PROBLEM SELECTOR PLAN 7
Code: Full code  DVT: Heparin 5000 units qd  Diet: DASH low fat  Dispo: pending medical optimization      Case Discussed with Dr. Liu    ************************  Hank Cedillo MD  PGY-1 Medicine  ************************

## 2024-09-05 NOTE — DIETITIAN INITIAL EVALUATION ADULT - PROBLEM SELECTOR PLAN 2
Left sided chest pain beginning two days ago that radiates to the back. Crackles on lower lung fields bilterally. Concern for ACS vs pancreatitis vs CHF. Was scheduled for an outpatient angio/cath on 9/6 because of triple vessel disease on cardiac CT, but presented to the ED prior to going for the procedure    HS Trop outpatient 08/09/24: 6  HS Trop in ED: 107 --> 132--> 168  proBNP - 2207  No leukocytosis  Xray - No focal consolidation. Compatible with moderate pulmonary edema.  EKG showed no ST changes    -continue trending trop q8  -guaifenesin prn for cough  - appreciate Cardiology recs for in-patient interventions

## 2024-09-05 NOTE — PROGRESS NOTE ADULT - PROBLEM SELECTOR PLAN 7
Code: Full code  Diet: DASH low fat  Dispo: Home    Case Discussed with Dr. Toney    ************************  Hank Cedillo MD  PGY-1 Medicine  ************************

## 2024-09-05 NOTE — DIETITIAN INITIAL EVALUATION ADULT - PERTINENT MEDS FT
no clubbing, cyanosis, or edema
MEDICATIONS  (STANDING):  aspirin enteric coated 81 milliGRAM(s) Oral daily  atorvastatin 80 milliGRAM(s) Oral at bedtime  clopidogrel Tablet 75 milliGRAM(s) Oral daily  dextrose 5%. 1000 milliLiter(s) (100 mL/Hr) IV Continuous <Continuous>  dextrose 5%. 1000 milliLiter(s) (50 mL/Hr) IV Continuous <Continuous>  dextrose 50% Injectable 12.5 Gram(s) IV Push once  dextrose 50% Injectable 25 Gram(s) IV Push once  dextrose 50% Injectable 25 Gram(s) IV Push once  dextrose Oral Gel 15 Gram(s) Oral once  glucagon  Injectable 1 milliGRAM(s) IntraMuscular once  heparin   Injectable 5000 Unit(s) SubCutaneous every 8 hours  insulin lispro (ADMELOG) corrective regimen sliding scale   SubCutaneous three times a day before meals  insulin lispro (ADMELOG) corrective regimen sliding scale   SubCutaneous at bedtime  metoprolol succinate ER 25 milliGRAM(s) Oral daily    MEDICATIONS  (PRN):  guaiFENesin Oral Liquid (Sugar-Free) 200 milliGRAM(s) Oral every 6 hours PRN Cough

## 2024-09-05 NOTE — DIETITIAN INITIAL EVALUATION ADULT - OTHER INFO
dosing wt: 61.7 kg (9/2)  daily wts: 69.2 kg (9/4, standing)  no [facility] wt hx to assess  reported UBW: 70.9 kg down to 54.5 kg *question accuracy of Pt's report

## 2024-09-05 NOTE — DIETITIAN INITIAL EVALUATION ADULT - ADD RECOMMEND
1) recommend liberalize diet to consistent carbohydrate, dash diet  2) RD to honor food preferences as offered/able to maximize PO intake  3) adjust ISS/insulin per Endocrine  4) recommend MVI daily to optimize nutrition status  5) Monitor PO intake, weight, labs, skin, GI status, diet

## 2024-09-05 NOTE — DIETITIAN INITIAL EVALUATION ADULT - ORAL INTAKE PTA/DIET HISTORY
Chart reviewed, events noted. Pt reports decreased appetite for 1-2 months PTA due to malaise. Pt c/o headaches, diarrhea, and metallic taste in mouth. Endorses ~30 pound wt loss during this time period. Question accuracy of Pt's history given his then detailed description of what he eats at home regularly. Pt prepares food at home for himself and his . Breakfast usually consists of diced vegetables with olive oil, eggs, turkey, and oatmeal with almond milk. Lunch and dinner consists of salad/vegetables with grilled chicken and beans. Pt avoids white bread and prefers almond milk to cow's milk. NKFA. Denies issues chewing/swallowing.

## 2024-09-05 NOTE — PROGRESS NOTE ADULT - PROBLEM SELECTOR PLAN 4
A1C 8.2% 07/24  - hold home metformin 500 mg BID and Jardiance 10 mg QD  - start low dose sliding scale corrective insulin with meals TID A1C 8.2% 07/24  - hold home metformin 500 mg BID and Jardiance 10 mg QD  - start low dose sliding scale corrective insulin with meals TID  - patient should resume home meds on d/c

## 2024-09-05 NOTE — PROGRESS NOTE ADULT - ASSESSMENT
53M w/ CAD (multivessel disease on CCTA), DMII presenting with NSTEMI, now s/p LHC which showed  of RCA with L to R collaterals and severe LAD lesion s/p PCI.     Recs:  -c/w ASA, plavix, atorva 80mg  -LDL 75, A1C elevated  -f/u TTE  -check lpA  -c/w toprol 25mg  -f/u with cardiology on discharge in 1-2 weeks  
53M with history of DM2, previous Hepatitis B, chronic pancreatitis, and HTN presenting with SOB with left chest pain. Pt had a CT cardiac on July 26th showing triple vessel disease and was scheduled for a cardiac cath on 9/6 prior to presentation to the ED. Concern for ACS vs CHF exacerbation
HPI:  Pt is a 54 yo male with history for DM, HTN, HLD, and triple vessel CAD who presented to the ED with SOB, chest pain, and bilateral leg swelling. Patient shared that he has had leg swelling for two week that has been worsening. Subjective fevers over the last few days and patient has developed a cough, but reports no mucous/sputum. When he coughs he has pain on the left side of the chest radiating to the back. Patient had a CT angio cardiac on 07/26/24 that showed triple vessel disease. Pt was scheduled for a cardiac cath 09/06 prior to presentation. In ED, given furosemide 40mg and patient reports increased urination and decreased leg swelling. Pt is diuretic naive. (02 Sep 2024 09:26)        s/p cardiac cath on 9/3/24 with PCI to LAD via radial access.    ECG: SR at 56 bpm  tele interpretation:  SR @ 80's with no  events overnight  Radial:   Right wrist stable w/ bleeding or hematoma; site soft, non tender.  Right radial pulse palpable +2.  Denies chest pain, denies right wrist/arm/hand:  pain, numbness, or tingling       - Reviewed and reinforced with patient:  wound care instructions, activities dos and don'ts medication compliance specifically antiplatelet therapy given stent/s.    - Patient aware to take DAPT  as prescribed and DO NOT STOP taking without consulting cardiologist first or STENT/s WILL CLOSE  - Reviewed and reinforced with patient:  site complications ( eg: bleeding, excruciating pain at the procedural site, large sweliing-golf ball size-  extremity numbness, tingling, temperature change), or CHEST PAIN; pt aware that if any of those occur he/she must call cardiologist IMMEDIATELY or 911 or go to nearest emergency room   - Reviewed and reinforced a heart healthy diet, Smoking Cessation  - Patient verbalizes understanding of ALL OF THE ABOVE, and gives positive feedback       cont DAPT ( ASA & Plavix)  cont antihypertensives   cont statin   please make sure DAPT is prescribed to pt's preferred pharmacy on dc    Keep Mg >2 K >4  f/u appt in 2 weeks post dc with oupt cardiologist Dr. Jones  for all  general cardiology questions please contact patient's primary cards team   all other care as per primary medicine  team       JORDAN Munoz,  Invasive Cardiology    
54 y/o male w/pmh of DM, HTN, HLD, and triple vessel CAD who presented to the ED with SOB, chest pain, and bilateral leg swelling, CT angio cardiac on 07/26/24 that showed triple vessel disease with NSTEMI s/p C with 2 vessel obstructive CAD in the LAD and RCA is s/p PCI/IVUS GUNNAR x 2 to LAD via RRA on 9/3/24 with right hand swelling and light bruising nears radial site with mild tenderness.    - Radial site with mild tenderness and ecchymosis with swelling in hand.  Would continue to elevate hand, swelling should come down over time.  No neurovascular deficits noted.    - Continue DAPT (aspirin 81mg and clopidogrel 75mg)  - Continue atorvastatin  - BP stable slightly labile low 120s to 140s outliers low 100s, high 150  - TTE 9/4: LVEF 69% with mild to moderate MR  - Recommend a heart healthy diet which includes a variety of fruits and vegetables, whole grains, low fat dairy products, legumes and skinless poultry and fish; food prepared with little or no salt and minimize processed foods  - Avoid using NSAIDs  (Aleve, Motrin, ibuprofen, naproxen) while on DAPT, please utilize Tylenol for pain control (not to exceed 4gm in 24 hours)  - Care per primary team  - Pt states will followup with Dr. Ami Roberts, Cardiologist 2 weeks post discharge.     History and assessment obtained through [facility]  ID # [id number] [facility]  Invasive Cardiology  Call or Text Teams
52 yo male h/o dm, htn, triple vessel CAD who presented with sob and chest pain. Found to have NSTEMI. Patient admitted for LHC today.    [id number] TWI in III and <1.5 mm DASHAWN in V1   Biomarkers; Troponin 107->132 ->168 -> 206 -> 188  Stress Test 2024: Poor R wave progression, with non-specific ST wave abnormalities of 2mm horizontal DASHAWN in V1 and avR, ST segment depressions in leads I, II, avL, V2, and V4-V6  CT coronary Angiogram 24L: Severe stenosis of the prox LAD, prox RCA, 1st and 2nd OM marginal branches  CXR: 24: pulmonary edema with vascular congestion   AMY: 3   MITUL: 104 points    Impression: Patient is a 53 year old gentlemen with multiple risk factors, a positive EKG stress test, and CT coronary showing multiple stenosis suggestive of multivessel disease who is now presenting with elevated troponins  concerning for type 1 vs. type 2 NSTEMI complicated by likely acute decompensated heart failure. DDx would include cardiomyopathy w/ volume driven ischemia vs. viral myocarditis vs. infection causing type 2 NSTEMI. The patient notes subjective fevers, but without leukocytosis. Tested positive for entero/rhinovirus, Complaining of throat pain but no sob or chest pain. Given the CT coronary findings concerning for multivessel disease, known diabetes and young age, patient will potentially be a CABG candidate. His current EKG is not as suggestive of a severe acute occult lesion, nonetheless has a reasonable Mitul score and warrants LHC for better characterization. He noted a contrast reaction after his CT coronary where he developed nausea and a red skin rash, which may require pretreatment. Appears euvolemic isrrael.    Recommendations:   #NSTEMI,   --c/w aspirin  --HOLD off on P2Y12 given severe RCA, LAD, OM1 and OM2 disease seen on CT coronary outpatient, young age and DM2, possible CABG candidate should he have true multivessel disease   --c/w heparin pr ACS protcol  --c/w atorvastatin 80 mg   - c/w lasix 40mg qd   --transthoracic echo   --hba1c - 8.1, lipid panel cholesterol 128, triglycerides 106, ldl 75.  --plan for possible LHC on 9/3  --Allergy Protocol: Given patient reported a rash with swelling and bumps to CCTA would favor empiric pretreatment:   >>13 hours/11PM : Prednisone 50mg   >>7 hours/5AM :Prednisone 50mg  >>1 hour/12PM: Prednisone 50mg PO + Diphenhydramine 50mg PO   --If worsening chest pain or if pain is refractory, please notify the Cardiology fellow   --Please monitor on telemetry  --K>4, Mg>2  --Start toprol 25mg     #Volume overload   --cw lasix 40mg qd IC  --: BNP 2207  --Goal net negative 1-2L    --Strict ins and outs   --Daily standing weights 
53M w/ CAD (multivessel disease on CCTA), DMII presenting with NSTEMI, now s/p LHC which showed  of RCA with L to R collaterals and severe LAD lesion s/p PCI.     Recs:  -c/w ASA, plavix, atorva 80mg  -LDL 75, A1C elevated  -TTE with EF 69% and moderate MR. no regional wall motion abnormalities  -check lpA  -c/w toprol 25mg  -f/u with cardiology on discharge in 1-2 weeks
53M with history of DM2, previous Hepatitis B, chronic pancreatitis, and HTN presenting with SOB with left chest pain. Pt had a CT cardiac on July 26th showing triple vessel disease and was scheduled for a cardiac cath on 9/6 prior to presentation to the ED. Concern for ACS vs CHF exacerbation
53M with history of DM2, previous Hepatitis B, chronic pancreatitis, and HTN presenting with SOB with left chest pain. Pt had a CT cardiac on July 26th showing triple vessel disease and was scheduled for a cardiac cath on 9/6 prior to presentation to the ED. Concern for ACS vs CHF exacerbation

## 2024-09-10 RX ORDER — ROSUVASTATIN CALCIUM 10 MG/1
1 TABLET ORAL
Refills: 0 | DISCHARGE

## 2024-09-10 RX ORDER — METFORMIN HYDROCHLORIDE 850 MG/1
1 TABLET, FILM COATED ORAL
Refills: 0 | DISCHARGE

## 2024-09-10 RX ORDER — DIPHENHYDRAMINE HCL 50 MG
1 CAPSULE ORAL
Refills: 0 | DISCHARGE

## 2024-09-10 RX ORDER — EMPAGLIFLOZIN 10 MG/1
1 TABLET, FILM COATED ORAL
Refills: 0 | DISCHARGE

## 2024-09-10 NOTE — CHART NOTE - NSCHARTNOTEFT_GEN_A_CORE
Post-Discharge Medication Review: Completed	  	   provided by [employer].   Name: Yasmani   ID: [id number]	  	  Patient's preferred pharmacy was updated in OMR: [facility] [place]	  	  Patient contacted to offer medication counseling post-discharge. Medication reconciliation completed. Per patient, medications include:	  	  1.	aspirin 81 mg oral delayed release tablet 1 tab(s) orally once a day  2.	clopidogrel 75 mg oral tablet 1 tab(s) orally once a day  3.	Jardiance 10 mg oral tablet 1 tab(s) orally once a day  4.	metoprolol succinate 25 mg oral tablet, extended release 1 tab(s) orally once a day  5.	rosuvastatin 40 mg oral capsule 1 cap(s) orally once a day  Medications added to OMR (updated per discussion with patient):	  6.	Benadryl 25 mg oral tablet 1 tab(s) orally once a day as needed for rash  	  Medications removed from OMR (updated per discussion with patient):	  1.	metFORMIN 500 mg oral tablet 1 tab(s) orally 2 times a day     Medication name, indication, administration, side effect, and monitoring reviewed for new medications during post discharge counseling visit with patient. Patient demonstrated understanding. Counseling offered for all medications.	    Per patient, he stopped taking metformin because he has adjusted his diet and his blood sugars are well controlled without it. Advised patient to confirm with his provider during his upcoming appointment on Friday that it is alright for him to stop taking this medication. Patient also reports that he has been having a rash on and off on his lower body which he believes is a result of the contrast reaction he had in the hospital and for which he has been using Benadryl with relief. He does endorse any other symptoms such as shortness of breath. Advised patient to also discuss this rash with his provider to see what the appropriate treatment would be. Patient expressed understanding.  	  Maryam Tavarez, B[place]	  Clinical Pharmacy Specialist, Pharmacy Telehealth Team	  Can be reached via MS Teams or [phone]

## 2024-09-10 NOTE — CHART NOTE - NSCHARTNOTESELECT_GEN_ALL_CORE
Current Issues/Problems reviewed on call: Follow up Watchman Procedure.    Details for Interventions/Education completed on call: Spoke with the patient.  Medications reviewed. Patient denies adherence concerns or side effects.  Patient recovering after Watchman procedure. Reports some shortness of breath with mild-moderate activity. Denies edema, chest, fever.  Discussed discharge instructions, restrictions and follow up post procedure. Discussed symptoms to monitor for, when to contact the provider versus when to seek care in the ED. Patient verbalized understanding.  Patient does have intermittent cough and some nasal drainage/congestion, attributed to seasonal allergies.    Screening completed for  COVID -19 using the [facility] Symptom . Screening is Negative for symptoms    Time Frame for Follow up:  Within within 1-2 weeks    Plan for Next Call: Symptoms: Edema, CP, SOB, Fever.  Incision site?    Care Plan reviewed with Patient  and he agrees with the plan of care and the call follow up plan.      Care Plan Reviewed with Dm Mclean DO   on 6/19/2020 via Fax  Electronically Signed by: Mary Bermudez LPN        Post-Discharge Note

## 2024-09-13 ENCOUNTER — OUTPATIENT (OUTPATIENT)
Dept: OUTPATIENT SERVICES | Facility: HOSPITAL | Age: 54
LOS: 1 days | End: 2024-09-13

## 2024-09-13 ENCOUNTER — APPOINTMENT (OUTPATIENT)
Dept: INTERNAL MEDICINE | Facility: CLINIC | Age: 54
End: 2024-09-13
Payer: COMMERCIAL

## 2024-09-13 VITALS
HEART RATE: 75 BPM | BODY MASS INDEX: 24.41 KG/M2 | SYSTOLIC BLOOD PRESSURE: 126 MMHG | OXYGEN SATURATION: 98 % | DIASTOLIC BLOOD PRESSURE: 80 MMHG | WEIGHT: 143 LBS | HEIGHT: 64 IN

## 2024-09-13 DIAGNOSIS — B18.1 CHRONIC VIRAL HEPATITIS B WITHOUT DELTA-AGENT: ICD-10-CM

## 2024-09-13 DIAGNOSIS — I25.10 ATHEROSCLEROTIC HEART DISEASE OF NATIVE CORONARY ARTERY W/OUT ANGINA PECTORIS: ICD-10-CM

## 2024-09-13 DIAGNOSIS — T78.3XXS ANGIONEUROTIC EDEMA, SEQUELA: ICD-10-CM

## 2024-09-13 DIAGNOSIS — I10 ESSENTIAL (PRIMARY) HYPERTENSION: ICD-10-CM

## 2024-09-13 DIAGNOSIS — E11.9 TYPE 2 DIABETES MELLITUS W/OUT COMPLICATIONS: ICD-10-CM

## 2024-09-13 DIAGNOSIS — R05.2 SUBACUTE COUGH: ICD-10-CM

## 2024-09-13 DIAGNOSIS — R74.01 ELEVATION OF LEVELS OF LIVER TRANSAMINASE LEVELS: ICD-10-CM

## 2024-09-13 DIAGNOSIS — E78.5 HYPERLIPIDEMIA, UNSPECIFIED: ICD-10-CM

## 2024-09-13 DIAGNOSIS — L50.8 OTHER URTICARIA: ICD-10-CM

## 2024-09-13 PROCEDURE — 99495 TRANSJ CARE MGMT MOD F2F 14D: CPT | Mod: GC

## 2024-09-14 PROBLEM — T78.3XXS ANGIOEDEMA, SEQUELA: Status: ACTIVE | Noted: 2024-09-14

## 2024-09-14 PROBLEM — L50.8 URTICARIA, ACUTE: Status: ACTIVE | Noted: 2024-09-14

## 2024-09-14 PROBLEM — I25.10 ATHEROSCLEROTIC HEART DISEASE OF NATIVE CORONARY ARTERY WITHOUT ANGINA PECTORIS: Chronic | Status: ACTIVE | Noted: 2024-09-02

## 2024-09-14 PROBLEM — R05.2 SUBACUTE COUGH: Status: ACTIVE | Noted: 2024-09-14

## 2024-09-16 NOTE — ASSESSMENT
[FreeTextEntry1] : 54 yo male with history for DM, HTN, HLD, and triple vessel CAD now s/p GUNNAR to LAD presenting as a hospital follow up.   CAD s/p GUNNAR to LAD -was supposed to have follow up with cardiology within one week of discharge but was lost to follow up  -Referred to cardiology. Also reached out to Dr. Roberts to expedite care and post-operative eval.  Stressed the importance of seeing a cardiologist status post PCI -Urged patient to continue Plavix and ASA to reduce risk of stent thrombosis. Patient endorsing some mild epistaxis and bruising after starting DAPT. No carmela blood or severe bleeding. Any medications adjustments should be made in consultation w/ cards -Continue metoprolol for mortality reduction -Education given concerning pros and cons of not getting intervention d/t concerns for anaphylaxis. I also gave th patient a brief explanation and overview of the procedure. Advised the patient that the PCI was necessary to prevent further damage to his heart and that medical providers were continually monitoring him for signs of adverse reaction   Hyperlipidemia -Refilled rosuvastatin   Urticaria Angioedema -Take Benadryl 25 mg as needed for urticaria -Avoid scent containing moisturizers and facial products  -If evidence of a more severe reaction (SOB, throat closing) present to ED  Cough -Most likely in setting of URI given sore throat; possibly residual cough from pulmonary edema -Conservative therapy; call clinic if it worsens  Health Maintenance -Flu short deferred as patient is sick  -Labs at next appointment -Follow up 10 weeks  Liane Oglesby MD PGY-1

## 2024-09-16 NOTE — HISTORY OF PRESENT ILLNESS
[Post-hospitalization from ___ Hospital] : Post-hospitalization from [unfilled] Hospital [Admitted on: ___] : The patient was admitted on [unfilled] [Discharged on ___] : discharged on [unfilled] [Discharge Summary] : discharge summary [Pertinent Labs] : pertinent labs [Radiology Findings] : radiology findings [Discharge Med List] : discharge medication list [Med Reconciliation] : medication reconciliation has been completed [FreeTextEntry2] : 54 yo male with history for DM, HTN, HLD, and triple vessel CAD who presented to the ED with SOB, chest pain, and bilateral leg swelling. Patient had a CT angio cardiac on 07/26/24 that showed triple vessel disease. Pt was scheduled for a cardiac cath 09/06 prior to presentation. In ED, given furosemide 40mg IV and responded well with dec SOB. Noted to have elevated troponin, peaking in 200s, without EKG changes so cardiology consulted. Continued on IV lasix 40mg with improvement of symptoms. Cardiology recommended hep gtt and ASA load for [facility] 9/3, where PCI was placed in LAD, also notable for 100% occluded RCA. TTE wnl. Cardiology ok with dc without diuretics and follow up within a week of discharge. On  9/4 patient's  reported that patient had swelling of the upper lip and was given benadryl, famotidine, and prednisone given prior history of delayed contrast reaction. Patient was examined the next day and was reportedly doing well. The patient was recommended to follow up with cardiology within a week.   Per the patient and his spouse, he initially presented to the ED with diffuse swelling of his upper and lower extremities and severe shortness of breath, and was found to have pulmonary edema, for which he was given a water pill and admitted to the hospital. He had numbness and tingling of the lips with urticaria at the time of discharge and was successfully treated with benadryl. Pt expressed concerns that the urticaria has recurred after discharge occasionally flaring up but notes improvements in his symptoms. Also had some concerns about petechiae and bruising on his upper arms where he was catheterized.   During the interview, the partner and his spouse wanted some clarification as to the need for cardiac intervention and angiogram, given the patient's previous history of contrast-induced nephropathy and allergic reaction. They felt that the patient should have been monitored more for anaphylactic reaction prior to his discharge. At the time of discharge, the patient did not have airway compromise or difficulty with breathing. At this visit, the patient notes his swelling has subsided, endorsing some dysphonia but no SOB or dysphagia.   Today, the patient endorses symptoms of productive cough and sore throat. His urticaria has improved, as has his swelling.

## 2024-09-16 NOTE — PHYSICAL EXAM
[No Acute Distress] : no acute distress [Well-Appearing] : well-appearing [Normal Sclera/Conjunctiva] : normal sclera/conjunctiva [EOMI] : extraocular movements intact [No JVD] : no jugular venous distention [No Lymphadenopathy] : no lymphadenopathy [Supple] : supple [No Respiratory Distress] : no respiratory distress  [No Accessory Muscle Use] : no accessory muscle use [Clear to Auscultation] : lungs were clear to auscultation bilaterally [Normal Rate] : normal rate  [Regular Rhythm] : with a regular rhythm [Normal S1, S2] : normal S1 and S2 [No Edema] : there was no peripheral edema [Soft] : abdomen soft [Non Tender] : non-tender [Non-distended] : non-distended [No HSM] : no HSM [Normal Bowel Sounds] : normal bowel sounds [Normal] : affect was normal and insight and judgment were intact [de-identified] : Erythematous uvula, posterior oropharynx and tonsils [de-identified] : Some petechia on  right upper extremity at catheter access site (radial) Few papular lesions on lower extremities (upper thigh)

## 2024-09-16 NOTE — PHYSICAL EXAM
[No Acute Distress] : no acute distress [Well-Appearing] : well-appearing [Normal Sclera/Conjunctiva] : normal sclera/conjunctiva [EOMI] : extraocular movements intact [No JVD] : no jugular venous distention [No Lymphadenopathy] : no lymphadenopathy [Supple] : supple [No Respiratory Distress] : no respiratory distress  [No Accessory Muscle Use] : no accessory muscle use [Clear to Auscultation] : lungs were clear to auscultation bilaterally [Normal Rate] : normal rate  [Regular Rhythm] : with a regular rhythm [Normal S1, S2] : normal S1 and S2 [No Edema] : there was no peripheral edema [Soft] : abdomen soft [Non Tender] : non-tender [Non-distended] : non-distended [No HSM] : no HSM [Normal Bowel Sounds] : normal bowel sounds [Normal] : affect was normal and insight and judgment were intact [de-identified] : Erythematous uvula, posterior oropharynx and tonsils [de-identified] : Some petechia on  right upper extremity at catheter access site (radial) Few papular lesions on lower extremities (upper thigh)

## 2024-09-16 NOTE — HISTORY OF PRESENT ILLNESS
[Post-hospitalization from ___ Hospital] : Post-hospitalization from [unfilled] Hospital [Admitted on: ___] : The patient was admitted on [unfilled] [Discharged on ___] : discharged on [unfilled] [Discharge Summary] : discharge summary [Pertinent Labs] : pertinent labs [Radiology Findings] : radiology findings [Discharge Med List] : discharge medication list [Med Reconciliation] : medication reconciliation has been completed [FreeTextEntry2] : 54 yo male with history for DM, HTN, HLD, and triple vessel CAD who presented to the ED with SOB, chest pain, and bilateral leg swelling. Patient had a CT angio cardiac on 07/26/24 that showed triple vessel disease. Pt was scheduled for a cardiac cath 09/06 prior to presentation. In ED, given furosemide 40mg IV and responded well with dec SOB. Noted to have elevated troponin, peaking in 200s, without EKG changes so cardiology consulted. Continued on IV lasix 40mg with improvement of symptoms. Cardiology recommended hep gtt and ASA load for [facility] 9/3, where PCI was placed in LAD, also notable for 100% occluded RCA. TTE wnl. Cardiology ok with dc without diuretics and follow up within a week of discharge. On  9/4 patient's  reported that patient had swelling of the upper lip and was given benadryl, famotidine, and prednisone given prior history of delayed contrast reaction. Patient was examined the next day and was reportedly doing well. The patient was recommended to follow up with cardiology within a week.   Per the patient and his spouse, he initially presented to the ED with diffuse swelling of his upper and lower extremities and severe shortness of breath, and was found to have pulmonary edema, for which he was given a water pill and admitted to the hospital. He had numbness and tingling of the lips with urticaria at the time of discharge and was successfully treated with benadryl. Pt expressed concerns that the urticaria has recurred after discharge occasionally flaring up but notes improvements in his symptoms. Also had some concerns about petechiae and bruising on his upper arms where he was catheterized.   During the interview, the partner and his spouse wanted some clarification as to the need for cardiac intervention and angiogram, given the patient's previous history of contrast-induced nephropathy and allergic reaction. They felt that the patient should have been monitored more for anaphylactic reaction prior to his discharge. At the time of discharge, the patient did not have airway compromise or difficulty with breathing. At this visit, the patient notes his swelling has subsided, endorsing some dysphonia but no SOB or dysphagia.   Today, the patient endorses symptoms of productive cough and sore throat. His urticaria has improved, as has his swelling.

## 2024-09-16 NOTE — HISTORY OF PRESENT ILLNESS
[Post-hospitalization from ___ Hospital] : Post-hospitalization from [unfilled] Hospital [Admitted on: ___] : The patient was admitted on [unfilled] [Discharged on ___] : discharged on [unfilled] [Discharge Summary] : discharge summary [Pertinent Labs] : pertinent labs [Radiology Findings] : radiology findings [Discharge Med List] : discharge medication list [Med Reconciliation] : medication reconciliation has been completed [FreeTextEntry2] : 54 yo male with history for DM, HTN, HLD, and triple vessel CAD who presented to the ED with SOB, chest pain, and bilateral leg swelling. Patient had a CT angio cardiac on 07/26/24 that showed triple vessel disease. Pt was scheduled for a cardiac cath 09/06 prior to presentation. In ED, given furosemide 40mg IV and responded well with dec SOB. Noted to have elevated troponin, peaking in 200s, without EKG changes so cardiology consulted. Continued on IV lasix 40mg with improvement of symptoms. Cardiology recommended hep gtt and ASA load for [facility] 9/3, where PCI was placed in LAD, also notable for 100% occluded RCA. TTE wnl. Cardiology ok with dc without diuretics and follow up within a week of discharge. On  9/4 patient's  reported that patient had swelling of the upper lip and was given benadryl, famotidine, and prednisone given prior history of delayed contrast reaction. Patient was examined the next day and was reportedly doing well. The patient was recommended to follow up with cardiology within a week.   Per the patient and his spouse, he initially presented to the ED with diffuse swelling of his upper and lower extremities and severe shortness of breath, and was found to have pulmonary edema, for which he was given a water pill and admitted to the hospital. He had numbness and tingling of the lips with urticaria at the time of discharge and was successfully treated with benadryl. Pt expressed concerns that the urticaria has recurred after discharge occasionally flaring up but notes improvements in his symptoms. Also had some concerns about petechiae and bruising on his upper arms where he was catheterized.   During the interview, the partner and his spouse wanted some clarification as to the need for cardiac intervention and angiogram, given the patient's previous history of contrast-induced nephropathy and allergic reaction. They felt that the patient should have been monitored more for anaphylactic reaction prior to his discharge. At the time of discharge, the patient did not have airway compromise or difficulty with breathing. At this visit, the patient notes his swelling has subsided, endorsing some dysphonia but no SOB or dysphagia.   Today, the patient endorses symptoms of productive cough and sore throat. His urticaria has improved, as has his swelling.

## 2024-09-16 NOTE — ASSESSMENT
Patient was unable to void, therefore, he as bladder scanned. The scan showed 900. He refused straight cath or brice.   0700 patient voided dark brown urine [FreeTextEntry1] : 52 yo male with history for DM, HTN, HLD, and triple vessel CAD now s/p GUNNAR to LAD presenting as a hospital follow up.   CAD s/p GUNNAR to LAD -was supposed to have follow up with cardiology within one week of discharge but was lost to follow up  -Referred to cardiology. Also reached out to Dr. Roberts to expedite care and post-operative eval.  Stressed the importance of seeing a cardiologist status post PCI -Urged patient to continue Plavix and ASA to reduce risk of stent thrombosis. Patient endorsing some mild epistaxis and bruising after starting DAPT. No carmela blood or severe bleeding. Any medications adjustments should be made in consultation w/ cards -Continue metoprolol for mortality reduction -Education given concerning pros and cons of not getting intervention d/t concerns for anaphylaxis. I also gave th patient a brief explanation and overview of the procedure. Advised the patient that the PCI was necessary to prevent further damage to his heart and that medical providers were continually monitoring him for signs of adverse reaction   Hyperlipidemia -Refilled rosuvastatin   Urticaria Angioedema -Take Benadryl 25 mg as needed for urticaria -Avoid scent containing moisturizers and facial products  -If evidence of a more severe reaction (SOB, throat closing) present to ED  Cough -Most likely in setting of URI given sore throat; possibly residual cough from pulmonary edema -Conservative therapy; call clinic if it worsens  Health Maintenance -Flu short deferred as patient is sick  -Labs at next appointment -Follow up 10 weeks  Liane Oglesby MD PGY-1

## 2024-09-16 NOTE — PHYSICAL EXAM
[No Acute Distress] : no acute distress [Well-Appearing] : well-appearing [Normal Sclera/Conjunctiva] : normal sclera/conjunctiva [EOMI] : extraocular movements intact [No JVD] : no jugular venous distention [No Lymphadenopathy] : no lymphadenopathy [Supple] : supple [No Respiratory Distress] : no respiratory distress  [No Accessory Muscle Use] : no accessory muscle use [Clear to Auscultation] : lungs were clear to auscultation bilaterally [Normal Rate] : normal rate  [Regular Rhythm] : with a regular rhythm [Normal S1, S2] : normal S1 and S2 [No Edema] : there was no peripheral edema [Soft] : abdomen soft [Non Tender] : non-tender [Non-distended] : non-distended [No HSM] : no HSM [Normal Bowel Sounds] : normal bowel sounds [Normal] : affect was normal and insight and judgment were intact [de-identified] : Erythematous uvula, posterior oropharynx and tonsils [de-identified] : Some petechia on  right upper extremity at catheter access site (radial) Few papular lesions on lower extremities (upper thigh)

## 2024-09-16 NOTE — END OF VISIT
[] : Resident [FreeTextEntry3] : Follow-up with cardiology to be arranged post discharge on September 5 and message sent to cardiology attending. Stable with follow-up in medical clinic as outlined. [Time Spent: ___ minutes] : I have spent [unfilled] minutes of time on the encounter which excludes teaching and separately reported services.

## 2024-09-24 ENCOUNTER — APPOINTMENT (OUTPATIENT)
Dept: CARDIOLOGY | Facility: HOSPITAL | Age: 54
End: 2024-09-24

## 2024-09-24 ENCOUNTER — OUTPATIENT (OUTPATIENT)
Dept: OUTPATIENT SERVICES | Facility: HOSPITAL | Age: 54
LOS: 1 days | End: 2024-09-24
Payer: SELF-PAY

## 2024-09-24 VITALS
WEIGHT: 143 LBS | BODY MASS INDEX: 24.41 KG/M2 | SYSTOLIC BLOOD PRESSURE: 133 MMHG | HEIGHT: 64 IN | DIASTOLIC BLOOD PRESSURE: 79 MMHG | OXYGEN SATURATION: 96 % | HEART RATE: 75 BPM

## 2024-09-24 DIAGNOSIS — L50.8 OTHER URTICARIA: ICD-10-CM

## 2024-09-24 DIAGNOSIS — R05.2 SUBACUTE COUGH: ICD-10-CM

## 2024-09-24 DIAGNOSIS — I25.10 ATHEROSCLEROTIC HEART DISEASE OF NATIVE CORONARY ARTERY WITHOUT ANGINA PECTORIS: ICD-10-CM

## 2024-09-24 DIAGNOSIS — T78.3XXS ANGIONEUROTIC EDEMA, SEQUELA: ICD-10-CM

## 2024-09-24 DIAGNOSIS — E11.9 TYPE 2 DIABETES MELLITUS WITHOUT COMPLICATIONS: ICD-10-CM

## 2024-09-24 DIAGNOSIS — E78.5 HYPERLIPIDEMIA, UNSPECIFIED: ICD-10-CM

## 2024-09-24 PROCEDURE — 93005 ELECTROCARDIOGRAM TRACING: CPT

## 2024-09-24 PROCEDURE — G0463: CPT

## 2024-09-30 NOTE — CARDIOLOGY SUMMARY
[de-identified] : **Treadmill EKG Stress Test (6/6/24):** - **Positive for ischemia**: ECG showed 2 mm ST elevation in aVR and V1, and 2 mm ST depression in leads I, II, aVL, V2, V4-V6 during exercise and recovery. - **Stan protocol**: Patient exercised for 12 min 19 sec, stopped due to fatigue, achieving 85% of predicted max heart rate (peak  bpm). - **Baseline ECG**: Normal sinus rhythm (81 bpm) with nonspecific ST-T wave abnormalities and poor R wave progression. - **No arrhythmias** were noted during stress testing. [de-identified] : [facility] 9/3/24: severe two-vessel coronary artery disease, with 95% stenosis in the left anterior descending artery and chronic total occlusion of the right coronary artery, which is being compensated by Rentrop grade 3 collaterals. There is mild left main disease with 30% stenosis, and the coronary circulation is co-dominant. The left ventricular end-diastolic pressure (LVEDP) is 22 mmHg. The patient underwent successful post-angioplasty with drug-eluting stenting in the left anterior descending artery, achieving AMY 3 flow. Coronary angiography also revealed 35% stenosis in the proximal circumflex artery, 10% stenosis in the mid-circumflex, and 55% stenosis in the Ramus intermedius artery.

## 2024-09-30 NOTE — CARDIOLOGY SUMMARY
[de-identified] : **Treadmill EKG Stress Test (6/6/24):** - **Positive for ischemia**: ECG showed 2 mm ST elevation in aVR and V1, and 2 mm ST depression in leads I, II, aVL, V2, V4-V6 during exercise and recovery. - **Stan protocol**: Patient exercised for 12 min 19 sec, stopped due to fatigue, achieving 85% of predicted max heart rate (peak  bpm). - **Baseline ECG**: Normal sinus rhythm (81 bpm) with nonspecific ST-T wave abnormalities and poor R wave progression. - **No arrhythmias** were noted during stress testing. [de-identified] : [facility] 9/3/24: severe two-vessel coronary artery disease, with 95% stenosis in the left anterior descending artery and chronic total occlusion of the right coronary artery, which is being compensated by Rentrop grade 3 collaterals. There is mild left main disease with 30% stenosis, and the coronary circulation is co-dominant. The left ventricular end-diastolic pressure (LVEDP) is 22 mmHg. The patient underwent successful post-angioplasty with drug-eluting stenting in the left anterior descending artery, achieving AMY 3 flow. Coronary angiography also revealed 35% stenosis in the proximal circumflex artery, 10% stenosis in the mid-circumflex, and 55% stenosis in the Ramus intermedius artery.

## 2024-09-30 NOTE — HISTORY OF PRESENT ILLNESS
[FreeTextEntry1] : 53-year-old male with DM, HTN, HLD, and triple vessel CAD who presents for [facility].   See prior notes and correspondence between IM and cardiology for full history. Briefly, presented with shortness of breath, chest pain, and leg swelling. CT angio (7/26/24) showed triple vessel disease, and he was scheduled for cardiac cath on 9/6. During an ED visit, he received IV Lasix for pulmonary edema and showed elevated troponins (peaking in 200s) without EKG changes. Cardiology recommended heparin gtt and ASA load for LHC on 9/3, during which a PCI was placed in the LAD with a 100% occluded RCA. Post-PCI, TTE was normal. Discharged without diuretics, advised to follow up with cardiology. Developed upper lip swelling post-contrast reaction, successfully treated with Benadryl. Reports intermittent urticaria and mild bruising at catheterization site post-discharge.   Overall feels well today. No new symptoms since discharge.

## 2024-09-30 NOTE — DISCUSSION/SUMMARY
[FreeTextEntry1] : 53-year-old male with DM, HTN, HLD, and 2-v vessel CAD s/p GUNNAR to LAD who presents for follwup.  2-v CAD GUNNAR to LAD  of RCA - C/w DAPT (ASA, Plavix) for 6 mo - C/w statin   #HTN: BP controlled today - c/w losartan 25 qd, recently initiated - c/w mgmt per PCP   #DM2: on metformin, jardiance - continue care per PCP, most recently A1c 9 improved from >11  #HLD - c/w rosuvastatin 40 mg qd, fenofibrate 48 mg qd

## 2024-09-30 NOTE — HISTORY OF PRESENT ILLNESS
[FreeTextEntry1] : 53-year-old male with DM, HTN, HLD, and triple vessel CAD who presents for [facility].   See prior notes and correspondence between IM and cardiology for full history. Briefly, presented with shortness of breath, chest pain, and leg swelling. CT angio (7/26/24) showed triple vessel disease, and he was scheduled for cardiac cath on 9/6. During an ED visit, he received IV Lasix for pulmonary edema and showed elevated troponins (peaking in 200s) without EKG changes. Cardiology recommended heparin gtt and ASA load for LHC on 9/3, during which a PCI was placed in the LAD with a 100% occluded RCA. Post-PCI, TTE was normal. Discharged without diuretics, advised to follow up with cardiology. Developed upper lip swelling post-contrast reaction, successfully treated with Benadryl. Reports intermittent urticaria and mild bruising at catheterization site post-discharge.   Overall feels well today. No new symptoms since discharge.

## 2024-10-28 ENCOUNTER — OUTPATIENT (OUTPATIENT)
Dept: OUTPATIENT SERVICES | Facility: HOSPITAL | Age: 54
LOS: 1 days | End: 2024-10-28
Payer: SELF-PAY

## 2024-10-28 ENCOUNTER — APPOINTMENT (OUTPATIENT)
Dept: INTERNAL MEDICINE | Facility: CLINIC | Age: 54
End: 2024-10-28
Payer: COMMERCIAL

## 2024-10-28 VITALS
HEART RATE: 77 BPM | HEIGHT: 64 IN | OXYGEN SATURATION: 98 % | SYSTOLIC BLOOD PRESSURE: 120 MMHG | BODY MASS INDEX: 24.41 KG/M2 | WEIGHT: 143 LBS | DIASTOLIC BLOOD PRESSURE: 70 MMHG

## 2024-10-28 DIAGNOSIS — E78.5 HYPERLIPIDEMIA, UNSPECIFIED: ICD-10-CM

## 2024-10-28 DIAGNOSIS — I25.10 ATHEROSCLEROTIC HEART DISEASE OF NATIVE CORONARY ARTERY W/OUT ANGINA PECTORIS: ICD-10-CM

## 2024-10-28 DIAGNOSIS — Z23 ENCOUNTER FOR IMMUNIZATION: ICD-10-CM

## 2024-10-28 DIAGNOSIS — E11.9 TYPE 2 DIABETES MELLITUS W/OUT COMPLICATIONS: ICD-10-CM

## 2024-10-28 DIAGNOSIS — Z00.00 ENCOUNTER FOR GENERAL ADULT MEDICAL EXAMINATION W/OUT ABNORMAL FINDINGS: ICD-10-CM

## 2024-10-28 DIAGNOSIS — I10 ESSENTIAL (PRIMARY) HYPERTENSION: ICD-10-CM

## 2024-10-28 LAB — HBA1C MFR BLD HPLC: 6.6

## 2024-10-28 PROCEDURE — G0009: CPT

## 2024-10-28 PROCEDURE — 90715 TDAP VACCINE 7 YRS/> IM: CPT

## 2024-10-28 PROCEDURE — 99213 OFFICE O/P EST LOW 20 MIN: CPT | Mod: 25,GE

## 2024-10-28 PROCEDURE — G0463: CPT | Mod: 25

## 2024-10-28 PROCEDURE — 90677 PCV20 VACCINE IM: CPT

## 2024-10-28 PROCEDURE — 83036 HEMOGLOBIN GLYCOSYLATED A1C: CPT

## 2024-10-28 PROCEDURE — G0008: CPT

## 2024-10-28 PROCEDURE — 90472 IMMUNIZATION ADMIN EACH ADD: CPT

## 2024-10-28 PROCEDURE — 90656 IIV3 VACC NO PRSV 0.5 ML IM: CPT

## 2024-10-28 RX ORDER — EMPAGLIFLOZIN 25 MG/1
25 TABLET, FILM COATED ORAL
Qty: 90 | Refills: 2 | Status: ACTIVE | COMMUNITY
Start: 2024-10-28 | End: 1900-01-01

## 2024-10-28 RX ORDER — METOPROLOL SUCCINATE 25 MG/1
25 TABLET, EXTENDED RELEASE ORAL
Qty: 90 | Refills: 2 | Status: ACTIVE | COMMUNITY
Start: 2024-10-28 | End: 1900-01-01

## 2024-10-28 RX ORDER — CLOPIDOGREL BISULFATE 75 MG/1
75 TABLET, FILM COATED ORAL DAILY
Qty: 90 | Refills: 0 | Status: ACTIVE | COMMUNITY
Start: 2024-10-28 | End: 1900-01-01

## 2024-10-28 RX ORDER — METOPROLOL SUCCINATE 25 MG/1
25 TABLET, EXTENDED RELEASE ORAL
Qty: 90 | Refills: 2 | Status: DISCONTINUED | COMMUNITY
Start: 2024-10-28 | End: 2024-10-28

## 2024-11-04 DIAGNOSIS — E11.9 TYPE 2 DIABETES MELLITUS WITHOUT COMPLICATIONS: ICD-10-CM

## 2024-11-04 DIAGNOSIS — Z23 ENCOUNTER FOR IMMUNIZATION: ICD-10-CM

## 2024-11-04 DIAGNOSIS — Z00.00 ENCOUNTER FOR GENERAL ADULT MEDICAL EXAMINATION WITHOUT ABNORMAL FINDINGS: ICD-10-CM

## 2024-11-04 DIAGNOSIS — I25.10 ATHEROSCLEROTIC HEART DISEASE OF NATIVE CORONARY ARTERY WITHOUT ANGINA PECTORIS: ICD-10-CM

## 2024-11-04 DIAGNOSIS — E78.5 HYPERLIPIDEMIA, UNSPECIFIED: ICD-10-CM

## 2024-11-15 ENCOUNTER — APPOINTMENT (OUTPATIENT)
Dept: OPHTHALMOLOGY | Facility: CLINIC | Age: 54
End: 2024-11-15
Payer: COMMERCIAL

## 2024-11-15 ENCOUNTER — NON-APPOINTMENT (OUTPATIENT)
Age: 54
End: 2024-11-15

## 2024-11-15 PROCEDURE — 92002 INTRM OPH EXAM NEW PATIENT: CPT

## 2024-11-18 ENCOUNTER — APPOINTMENT (OUTPATIENT)
Dept: OTOLARYNGOLOGY | Facility: CLINIC | Age: 54
End: 2024-11-18
Payer: COMMERCIAL

## 2024-11-18 ENCOUNTER — OUTPATIENT (OUTPATIENT)
Dept: OUTPATIENT SERVICES | Facility: HOSPITAL | Age: 54
LOS: 1 days | Discharge: HOME | End: 2024-11-18

## 2024-11-18 VITALS
TEMPERATURE: 97 F | WEIGHT: 143 LBS | BODY MASS INDEX: 24.41 KG/M2 | HEART RATE: 76 BPM | DIASTOLIC BLOOD PRESSURE: 85 MMHG | HEIGHT: 64 IN | SYSTOLIC BLOOD PRESSURE: 161 MMHG

## 2024-11-18 DIAGNOSIS — H90.6 MIXED CONDUCTIVE AND SENSORINEURAL HEARING LOSS, BILATERAL: ICD-10-CM

## 2024-11-18 DIAGNOSIS — H93.293 OTHER ABNORMAL AUDITORY PERCEPTIONS, BILATERAL: ICD-10-CM

## 2024-11-18 DIAGNOSIS — R43.8 OTHER DISTURBANCES OF SMELL AND TASTE: ICD-10-CM

## 2024-11-18 PROCEDURE — 92567 TYMPANOMETRY: CPT

## 2024-11-18 PROCEDURE — 92557 COMPREHENSIVE HEARING TEST: CPT

## 2024-11-18 PROCEDURE — 99203 OFFICE O/P NEW LOW 30 MIN: CPT | Mod: 25

## 2024-11-18 PROCEDURE — 31231 NASAL ENDOSCOPY DX: CPT

## 2024-11-18 RX ORDER — FLUTICASONE PROPIONATE 50 UG/1
50 SPRAY, METERED NASAL TWICE DAILY
Qty: 1 | Refills: 6 | Status: ACTIVE | COMMUNITY
Start: 2024-11-18 | End: 1900-01-01

## 2024-11-18 RX ORDER — SOD CHLOR,BICARB/SQUEEZ BOTTLE
PACKET, WITH RINSE DEVICE NASAL
Qty: 100 | Refills: 1 | Status: ACTIVE | COMMUNITY
Start: 2024-11-18 | End: 1900-01-01

## 2024-11-19 ENCOUNTER — APPOINTMENT (OUTPATIENT)
Dept: INTERNAL MEDICINE | Facility: CLINIC | Age: 54
End: 2024-11-19
Payer: COMMERCIAL

## 2024-11-19 ENCOUNTER — NON-APPOINTMENT (OUTPATIENT)
Age: 54
End: 2024-11-19

## 2024-11-19 ENCOUNTER — OUTPATIENT (OUTPATIENT)
Dept: OUTPATIENT SERVICES | Facility: HOSPITAL | Age: 54
LOS: 1 days | End: 2024-11-19
Payer: SELF-PAY

## 2024-11-19 VITALS
WEIGHT: 148 LBS | DIASTOLIC BLOOD PRESSURE: 76 MMHG | HEART RATE: 76 BPM | HEIGHT: 64 IN | OXYGEN SATURATION: 98 % | BODY MASS INDEX: 25.27 KG/M2 | SYSTOLIC BLOOD PRESSURE: 130 MMHG

## 2024-11-19 DIAGNOSIS — R07.89 OTHER CHEST PAIN: ICD-10-CM

## 2024-11-19 DIAGNOSIS — I10 ESSENTIAL (PRIMARY) HYPERTENSION: ICD-10-CM

## 2024-11-19 DIAGNOSIS — I25.10 ATHEROSCLEROTIC HEART DISEASE OF NATIVE CORONARY ARTERY W/OUT ANGINA PECTORIS: ICD-10-CM

## 2024-11-19 DIAGNOSIS — E78.5 HYPERLIPIDEMIA, UNSPECIFIED: ICD-10-CM

## 2024-11-19 DIAGNOSIS — E11.40 TYPE 2 DIABETES MELLITUS WITH DIABETIC NEUROPATHY, UNSPECIFIED: ICD-10-CM

## 2024-11-19 PROCEDURE — 99214 OFFICE O/P EST MOD 30 MIN: CPT | Mod: GC

## 2024-11-19 PROCEDURE — G0463: CPT

## 2024-11-21 PROBLEM — R07.89 CHEST DISCOMFORT: Status: ACTIVE | Noted: 2024-11-21

## 2024-11-22 ENCOUNTER — NON-APPOINTMENT (OUTPATIENT)
Age: 54
End: 2024-11-22

## 2024-11-22 ENCOUNTER — APPOINTMENT (OUTPATIENT)
Dept: OPHTHALMOLOGY | Facility: CLINIC | Age: 54
End: 2024-11-22
Payer: COMMERCIAL

## 2024-11-22 PROCEDURE — 92012 INTRM OPH EXAM EST PATIENT: CPT

## 2024-12-03 ENCOUNTER — APPOINTMENT (OUTPATIENT)
Dept: GASTROENTEROLOGY | Facility: HOSPITAL | Age: 54
End: 2024-12-03

## 2024-12-03 DIAGNOSIS — I25.10 ATHEROSCLEROTIC HEART DISEASE OF NATIVE CORONARY ARTERY WITHOUT ANGINA PECTORIS: ICD-10-CM

## 2024-12-03 DIAGNOSIS — E78.5 HYPERLIPIDEMIA, UNSPECIFIED: ICD-10-CM

## 2024-12-03 DIAGNOSIS — R07.89 OTHER CHEST PAIN: ICD-10-CM

## 2024-12-03 DIAGNOSIS — E11.40 TYPE 2 DIABETES MELLITUS WITH DIABETIC NEUROPATHY, UNSPECIFIED: ICD-10-CM

## 2024-12-09 ENCOUNTER — OUTPATIENT (OUTPATIENT)
Dept: OUTPATIENT SERVICES | Facility: HOSPITAL | Age: 54
LOS: 1 days | End: 2024-12-09
Payer: SELF-PAY

## 2024-12-09 ENCOUNTER — APPOINTMENT (OUTPATIENT)
Dept: INTERNAL MEDICINE | Facility: CLINIC | Age: 54
End: 2024-12-09
Payer: COMMERCIAL

## 2024-12-09 ENCOUNTER — LABORATORY RESULT (OUTPATIENT)
Age: 54
End: 2024-12-09

## 2024-12-09 VITALS
WEIGHT: 148 LBS | DIASTOLIC BLOOD PRESSURE: 72 MMHG | SYSTOLIC BLOOD PRESSURE: 120 MMHG | BODY MASS INDEX: 25.27 KG/M2 | HEIGHT: 64 IN | HEART RATE: 79 BPM | OXYGEN SATURATION: 98 %

## 2024-12-09 DIAGNOSIS — D69.0 ALLERGIC PURPURA: ICD-10-CM

## 2024-12-09 DIAGNOSIS — L95.9 VASCULITIS LIMITED TO THE SKIN, UNSPECIFIED: ICD-10-CM

## 2024-12-09 DIAGNOSIS — I10 ESSENTIAL (PRIMARY) HYPERTENSION: ICD-10-CM

## 2024-12-09 PROCEDURE — 99213 OFFICE O/P EST LOW 20 MIN: CPT | Mod: GE

## 2024-12-09 RX ORDER — PREDNISONE 10 MG/1
10 TABLET ORAL DAILY
Qty: 7 | Refills: 0 | Status: ACTIVE | COMMUNITY
Start: 2024-12-09 | End: 1900-01-01

## 2024-12-11 ENCOUNTER — LABORATORY RESULT (OUTPATIENT)
Age: 54
End: 2024-12-11

## 2024-12-11 ENCOUNTER — APPOINTMENT (OUTPATIENT)
Dept: INTERNAL MEDICINE | Facility: CLINIC | Age: 54
End: 2024-12-11

## 2024-12-11 VITALS
HEART RATE: 84 BPM | DIASTOLIC BLOOD PRESSURE: 74 MMHG | WEIGHT: 150 LBS | BODY MASS INDEX: 25.61 KG/M2 | HEIGHT: 64 IN | SYSTOLIC BLOOD PRESSURE: 118 MMHG | OXYGEN SATURATION: 97 %

## 2024-12-11 DIAGNOSIS — Z21 ASYMPTOMATIC HUMAN IMMUNODEFICIENCY VIRUS [HIV] INFECTION STATUS: ICD-10-CM

## 2024-12-11 LAB
ALBUMIN SERPL ELPH-MCNC: 4.3 G/DL
ALP BLD-CCNC: 146 U/L
ALT SERPL-CCNC: 44 U/L
ANION GAP SERPL CALC-SCNC: 11 MMOL/L
AST SERPL-CCNC: 36 U/L
BASOPHILS # BLD AUTO: 0.02 K/UL
BASOPHILS NFR BLD AUTO: 0.4 %
BILIRUB SERPL-MCNC: 0.2 MG/DL
BUN SERPL-MCNC: 16 MG/DL
CALCIUM SERPL-MCNC: 9.3 MG/DL
CHLORIDE SERPL-SCNC: 102 MMOL/L
CO2 SERPL-SCNC: 23 MMOL/L
CREAT SERPL-MCNC: 0.93 MG/DL
CRP SERPL-MCNC: 15 MG/L
EGFR: 98 ML/MIN/1.73M2
EOSINOPHIL # BLD AUTO: 0.08 K/UL
EOSINOPHIL NFR BLD AUTO: 1.7 %
ERYTHROCYTE [SEDIMENTATION RATE] IN BLOOD BY WESTERGREN METHOD: 34 MM/HR
GLUCOSE SERPL-MCNC: 208 MG/DL
HAV IGM SER QL: NONREACTIVE
HBV CORE IGM SER QL: NONREACTIVE
HBV SURFACE AG SER QL: NONREACTIVE
HCT VFR BLD CALC: 39.2 %
HCV AB SER QL: NONREACTIVE
HCV S/CO RATIO: 0.21 S/CO
HGB BLD-MCNC: 13 G/DL
HIV1+2 AB SPEC QL IA.RAPID: REACTIVE
IMM GRANULOCYTES NFR BLD AUTO: 0.4 %
LYMPHOCYTES # BLD AUTO: 1.67 K/UL
LYMPHOCYTES NFR BLD AUTO: 34.5 %
MAN DIFF?: NORMAL
MCHC RBC-ENTMCNC: 30.2 PG
MCHC RBC-ENTMCNC: 33.2 G/DL
MCV RBC AUTO: 91 FL
MONOCYTES # BLD AUTO: 0.4 K/UL
MONOCYTES NFR BLD AUTO: 8.3 %
NEUTROPHILS # BLD AUTO: 2.65 K/UL
NEUTROPHILS NFR BLD AUTO: 54.7 %
PLATELET # BLD AUTO: 247 K/UL
POTASSIUM SERPL-SCNC: 4.8 MMOL/L
PROT SERPL-MCNC: 7.9 G/DL
RBC # BLD: 4.31 M/UL
RBC # FLD: 12.6 %
SODIUM SERPL-SCNC: 137 MMOL/L
WBC # FLD AUTO: 4.84 K/UL

## 2024-12-11 PROCEDURE — 85652 RBC SED RATE AUTOMATED: CPT

## 2024-12-11 PROCEDURE — 87591 N.GONORRHOEAE DNA AMP PROB: CPT

## 2024-12-11 PROCEDURE — 87536 HIV-1 QUANT&REVRSE TRNSCRPJ: CPT

## 2024-12-11 PROCEDURE — 80074 ACUTE HEPATITIS PANEL: CPT

## 2024-12-11 PROCEDURE — G2211 COMPLEX E/M VISIT ADD ON: CPT

## 2024-12-11 PROCEDURE — 86360 T CELL ABSOLUTE COUNT/RATIO: CPT

## 2024-12-11 PROCEDURE — 99215 OFFICE O/P EST HI 40 MIN: CPT | Mod: GC

## 2024-12-11 PROCEDURE — 87900 PHENOTYPE INFECT AGENT DRUG: CPT

## 2024-12-11 PROCEDURE — 86036 ANCA SCREEN EACH ANTIBODY: CPT

## 2024-12-11 PROCEDURE — 86359 T CELLS TOTAL COUNT: CPT

## 2024-12-11 PROCEDURE — 87389 HIV-1 AG W/HIV-1&-2 AB AG IA: CPT

## 2024-12-11 PROCEDURE — 87906 NFCT AGT GNTYP ALYS HIV1: CPT

## 2024-12-11 PROCEDURE — 85025 COMPLETE CBC W/AUTO DIFF WBC: CPT

## 2024-12-11 PROCEDURE — 86701 HIV-1ANTIBODY: CPT

## 2024-12-11 PROCEDURE — 86140 C-REACTIVE PROTEIN: CPT

## 2024-12-11 PROCEDURE — 86038 ANTINUCLEAR ANTIBODIES: CPT

## 2024-12-11 PROCEDURE — 87901 NFCT AGT GNTYP ALYS HIV1 REV: CPT

## 2024-12-11 PROCEDURE — 87491 CHLMYD TRACH DNA AMP PROBE: CPT

## 2024-12-11 PROCEDURE — 86780 TREPONEMA PALLIDUM: CPT

## 2024-12-11 PROCEDURE — G0463: CPT

## 2024-12-11 PROCEDURE — 80053 COMPREHEN METABOLIC PANEL: CPT

## 2024-12-11 PROCEDURE — 86702 HIV-2 ANTIBODY: CPT

## 2024-12-13 LAB
ANA PAT FLD IF-IMP: ABNORMAL
ANA SER IF-ACNC: ABNORMAL
C TRACH RRNA SPEC QL NAA+PROBE: NOT DETECTED
HIV1 RNA # SERPL NAA+PROBE: ABNORMAL
HIV1 RNA # SERPL NAA+PROBE: NORMAL
N GONORRHOEA RRNA SPEC QL NAA+PROBE: NOT DETECTED
SOURCE AMPLIFICATION: NORMAL
T PALLIDUM AB SER QL IA: NEGATIVE
VIRAL LOAD INTERP: NORMAL
VIRAL LOAD LOG: 4.54

## 2024-12-16 DIAGNOSIS — D69.0 ALLERGIC PURPURA: ICD-10-CM

## 2024-12-16 DIAGNOSIS — Z21 ASYMPTOMATIC HUMAN IMMUNODEFICIENCY VIRUS [HIV] INFECTION STATUS: ICD-10-CM

## 2024-12-16 LAB
CD3 CELLS # BLD: 789 CELLS/UL
CD3 CELLS NFR BLD: 78 %
CD3+CD4+ CELLS # BLD: 280 CELLS/UL
CD3+CD4+ CELLS NFR BLD: 28 %
CD3+CD4+ CELLS/CD3+CD8+ CLL SPEC: 0.57 RATIO
CD3+CD8+ CELLS # SPEC: 490 CELLS/UL
CD3+CD8+ CELLS NFR BLD: 48 %

## 2024-12-17 ENCOUNTER — OUTPATIENT (OUTPATIENT)
Dept: OUTPATIENT SERVICES | Facility: HOSPITAL | Age: 54
LOS: 1 days | End: 2024-12-17
Payer: SELF-PAY

## 2024-12-17 ENCOUNTER — NON-APPOINTMENT (OUTPATIENT)
Age: 54
End: 2024-12-17

## 2024-12-17 ENCOUNTER — APPOINTMENT (OUTPATIENT)
Dept: CARDIOLOGY | Facility: HOSPITAL | Age: 54
End: 2024-12-17

## 2024-12-17 VITALS
HEART RATE: 77 BPM | OXYGEN SATURATION: 97 % | HEIGHT: 64 IN | DIASTOLIC BLOOD PRESSURE: 84 MMHG | SYSTOLIC BLOOD PRESSURE: 135 MMHG | BODY MASS INDEX: 25.61 KG/M2 | WEIGHT: 150 LBS

## 2024-12-17 DIAGNOSIS — I25.10 ATHEROSCLEROTIC HEART DISEASE OF NATIVE CORONARY ARTERY WITHOUT ANGINA PECTORIS: ICD-10-CM

## 2024-12-17 PROCEDURE — G0463: CPT

## 2024-12-17 PROCEDURE — 93005 ELECTROCARDIOGRAM TRACING: CPT

## 2024-12-20 DIAGNOSIS — I10 ESSENTIAL (PRIMARY) HYPERTENSION: ICD-10-CM

## 2024-12-22 LAB
BICTEGRAVIR RESISTANCE: NORMAL
CABOTEGRAVIR RESISTANCE: NORMAL
DOLUTEGRAVIR RESISTANCE: NORMAL
ELVITEGRAVIR RESISTANCE: NORMAL
HIV RT GENE MUT DET ISLT: NORMAL
HIV-1 GENOTYPE DRUG RESISTANCE: DETECTED
HIV-1 INTEGRASE GENOTYPE: NORMAL
RALTEGRAVIR RESISTANCE: NORMAL
VIRAL LOAD DATE: NORMAL

## 2025-01-02 DIAGNOSIS — R43.8 OTHER DISTURBANCES OF SMELL AND TASTE: ICD-10-CM

## 2025-01-02 DIAGNOSIS — H93.293 OTHER ABNORMAL AUDITORY PERCEPTIONS, BILATERAL: ICD-10-CM

## 2025-01-08 ENCOUNTER — APPOINTMENT (OUTPATIENT)
Dept: INTERNAL MEDICINE | Facility: CLINIC | Age: 55
End: 2025-01-08
Payer: COMMERCIAL

## 2025-01-08 ENCOUNTER — OUTPATIENT (OUTPATIENT)
Dept: OUTPATIENT SERVICES | Facility: HOSPITAL | Age: 55
LOS: 1 days | End: 2025-01-08
Payer: SELF-PAY

## 2025-01-08 ENCOUNTER — NON-APPOINTMENT (OUTPATIENT)
Age: 55
End: 2025-01-08

## 2025-01-08 VITALS
SYSTOLIC BLOOD PRESSURE: 138 MMHG | WEIGHT: 150 LBS | OXYGEN SATURATION: 97 % | HEIGHT: 64 IN | DIASTOLIC BLOOD PRESSURE: 80 MMHG | BODY MASS INDEX: 25.61 KG/M2 | HEART RATE: 88 BPM

## 2025-01-08 DIAGNOSIS — Z00.00 ENCOUNTER FOR GENERAL ADULT MEDICAL EXAMINATION W/OUT ABNORMAL FINDINGS: ICD-10-CM

## 2025-01-08 DIAGNOSIS — E11.9 TYPE 2 DIABETES MELLITUS W/OUT COMPLICATIONS: ICD-10-CM

## 2025-01-08 DIAGNOSIS — R07.89 OTHER CHEST PAIN: ICD-10-CM

## 2025-01-08 DIAGNOSIS — I10 ESSENTIAL (PRIMARY) HYPERTENSION: ICD-10-CM

## 2025-01-08 DIAGNOSIS — I25.10 ATHEROSCLEROTIC HEART DISEASE OF NATIVE CORONARY ARTERY W/OUT ANGINA PECTORIS: ICD-10-CM

## 2025-01-08 PROCEDURE — 99396 PREV VISIT EST AGE 40-64: CPT | Mod: GC

## 2025-01-09 ENCOUNTER — OUTPATIENT (OUTPATIENT)
Dept: OUTPATIENT SERVICES | Facility: HOSPITAL | Age: 55
LOS: 1 days | End: 2025-01-09
Payer: SELF-PAY

## 2025-01-09 ENCOUNTER — APPOINTMENT (OUTPATIENT)
Dept: INFECTIOUS DISEASE | Facility: CLINIC | Age: 55
End: 2025-01-09
Payer: COMMERCIAL

## 2025-01-09 VITALS
OXYGEN SATURATION: 97 % | DIASTOLIC BLOOD PRESSURE: 83 MMHG | HEIGHT: 64 IN | BODY MASS INDEX: 26.12 KG/M2 | TEMPERATURE: 98.9 F | SYSTOLIC BLOOD PRESSURE: 158 MMHG | WEIGHT: 153 LBS | HEART RATE: 99 BPM | RESPIRATION RATE: 19 BRPM

## 2025-01-09 PROCEDURE — 87338 HPYLORI STOOL AG IA: CPT

## 2025-01-09 PROCEDURE — G0463: CPT

## 2025-01-09 PROCEDURE — ZZZZZ: CPT

## 2025-01-09 PROCEDURE — T1013: CPT

## 2025-01-13 DIAGNOSIS — B20 HUMAN IMMUNODEFICIENCY VIRUS [HIV] DISEASE: ICD-10-CM

## 2025-01-13 LAB — H PYLORI AG STL QL: NEGATIVE

## 2025-01-15 ENCOUNTER — APPOINTMENT (OUTPATIENT)
Dept: INTERNAL MEDICINE | Facility: CLINIC | Age: 55
End: 2025-01-15
Payer: COMMERCIAL

## 2025-01-15 ENCOUNTER — OUTPATIENT (OUTPATIENT)
Dept: OUTPATIENT SERVICES | Facility: HOSPITAL | Age: 55
LOS: 1 days | End: 2025-01-15
Payer: SELF-PAY

## 2025-01-15 VITALS
BODY MASS INDEX: 25.44 KG/M2 | SYSTOLIC BLOOD PRESSURE: 150 MMHG | WEIGHT: 149 LBS | HEART RATE: 72 BPM | DIASTOLIC BLOOD PRESSURE: 80 MMHG | OXYGEN SATURATION: 97 % | HEIGHT: 64 IN

## 2025-01-15 DIAGNOSIS — I10 ESSENTIAL (PRIMARY) HYPERTENSION: ICD-10-CM

## 2025-01-15 DIAGNOSIS — Z21 ASYMPTOMATIC HUMAN IMMUNODEFICIENCY VIRUS [HIV] INFECTION STATUS: ICD-10-CM

## 2025-01-15 PROCEDURE — G2211 COMPLEX E/M VISIT ADD ON: CPT

## 2025-01-15 PROCEDURE — 99215 OFFICE O/P EST HI 40 MIN: CPT | Mod: GC

## 2025-01-15 PROCEDURE — G0463: CPT

## 2025-01-17 RX ORDER — FAMOTIDINE 20 MG/1
20 TABLET, FILM COATED ORAL
Qty: 180 | Refills: 1 | Status: ACTIVE | COMMUNITY
Start: 2025-01-08 | End: 1900-01-01

## 2025-01-22 DIAGNOSIS — E11.9 TYPE 2 DIABETES MELLITUS WITHOUT COMPLICATIONS: ICD-10-CM

## 2025-01-22 DIAGNOSIS — Z00.00 ENCOUNTER FOR GENERAL ADULT MEDICAL EXAMINATION WITHOUT ABNORMAL FINDINGS: ICD-10-CM

## 2025-01-22 DIAGNOSIS — I25.10 ATHEROSCLEROTIC HEART DISEASE OF NATIVE CORONARY ARTERY WITHOUT ANGINA PECTORIS: ICD-10-CM

## 2025-01-22 DIAGNOSIS — R07.89 OTHER CHEST PAIN: ICD-10-CM

## 2025-01-22 DIAGNOSIS — Z21 ASYMPTOMATIC HUMAN IMMUNODEFICIENCY VIRUS [HIV] INFECTION STATUS: ICD-10-CM

## 2025-01-23 ENCOUNTER — NON-APPOINTMENT (OUTPATIENT)
Age: 55
End: 2025-01-23

## 2025-01-28 ENCOUNTER — NON-APPOINTMENT (OUTPATIENT)
Age: 55
End: 2025-01-28

## 2025-03-12 ENCOUNTER — RX RENEWAL (OUTPATIENT)
Age: 55
End: 2025-03-12

## 2025-03-18 ENCOUNTER — OUTPATIENT (OUTPATIENT)
Dept: OUTPATIENT SERVICES | Facility: HOSPITAL | Age: 55
LOS: 1 days | End: 2025-03-18
Payer: SELF-PAY

## 2025-03-18 ENCOUNTER — NON-APPOINTMENT (OUTPATIENT)
Age: 55
End: 2025-03-18

## 2025-03-18 ENCOUNTER — APPOINTMENT (OUTPATIENT)
Dept: CARDIOLOGY | Facility: HOSPITAL | Age: 55
End: 2025-03-18

## 2025-03-18 VITALS
HEIGHT: 64 IN | HEART RATE: 78 BPM | WEIGHT: 149 LBS | OXYGEN SATURATION: 97 % | DIASTOLIC BLOOD PRESSURE: 85 MMHG | SYSTOLIC BLOOD PRESSURE: 144 MMHG | BODY MASS INDEX: 25.44 KG/M2

## 2025-03-18 DIAGNOSIS — I25.10 ATHEROSCLEROTIC HEART DISEASE OF NATIVE CORONARY ARTERY WITHOUT ANGINA PECTORIS: ICD-10-CM

## 2025-03-18 PROCEDURE — 93005 ELECTROCARDIOGRAM TRACING: CPT

## 2025-03-18 PROCEDURE — G0463: CPT

## 2025-03-19 DIAGNOSIS — I10 ESSENTIAL (PRIMARY) HYPERTENSION: ICD-10-CM

## 2025-03-24 ENCOUNTER — APPOINTMENT (OUTPATIENT)
Dept: OTOLARYNGOLOGY | Facility: CLINIC | Age: 55
End: 2025-03-24
Payer: COMMERCIAL

## 2025-03-24 VITALS
BODY MASS INDEX: 25.44 KG/M2 | SYSTOLIC BLOOD PRESSURE: 147 MMHG | HEIGHT: 64 IN | DIASTOLIC BLOOD PRESSURE: 83 MMHG | WEIGHT: 149 LBS | HEART RATE: 86 BPM

## 2025-03-24 DIAGNOSIS — K21.9 GASTRO-ESOPHAGEAL REFLUX DISEASE W/OUT ESOPHAGITIS: ICD-10-CM

## 2025-03-24 DIAGNOSIS — R43.8 OTHER DISTURBANCES OF SMELL AND TASTE: ICD-10-CM

## 2025-03-24 DIAGNOSIS — J39.2 OTHER DISEASES OF PHARYNX: ICD-10-CM

## 2025-03-24 DIAGNOSIS — H90.6 MIXED CONDUCTIVE AND SENSORINEURAL HEARING LOSS, BILATERAL: ICD-10-CM

## 2025-03-24 DIAGNOSIS — J38.7 OTHER DISEASES OF LARYNX: ICD-10-CM

## 2025-03-24 PROCEDURE — 99214 OFFICE O/P EST MOD 30 MIN: CPT | Mod: 25

## 2025-03-24 PROCEDURE — 31575 DIAGNOSTIC LARYNGOSCOPY: CPT

## 2025-03-26 RX ORDER — SOD CHLOR,BICARB/SQUEEZ BOTTLE
PACKET, WITH RINSE DEVICE NASAL
Qty: 100 | Refills: 1 | Status: ACTIVE | COMMUNITY
Start: 2025-03-26 | End: 1900-01-01

## 2025-03-26 RX ORDER — FLUTICASONE PROPIONATE 50 UG/1
50 SPRAY, METERED NASAL TWICE DAILY
Qty: 1 | Refills: 3 | Status: ACTIVE | COMMUNITY
Start: 2025-03-26 | End: 1900-01-01

## 2025-04-11 ENCOUNTER — APPOINTMENT (OUTPATIENT)
Dept: OTOLARYNGOLOGY | Facility: CLINIC | Age: 55
End: 2025-04-11
Payer: COMMERCIAL

## 2025-04-11 VITALS — WEIGHT: 149 LBS | HEIGHT: 64 IN | BODY MASS INDEX: 25.44 KG/M2

## 2025-04-11 VITALS — SYSTOLIC BLOOD PRESSURE: 180 MMHG | DIASTOLIC BLOOD PRESSURE: 90 MMHG

## 2025-04-11 DIAGNOSIS — R49.0 DYSPHONIA: ICD-10-CM

## 2025-04-11 DIAGNOSIS — J38.7 OTHER DISEASES OF LARYNX: ICD-10-CM

## 2025-04-11 PROCEDURE — 99214 OFFICE O/P EST MOD 30 MIN: CPT | Mod: 25

## 2025-04-11 PROCEDURE — 31579 LARYNGOSCOPY TELESCOPIC: CPT

## 2025-04-11 RX ORDER — BECLOMETHASONE DIPROPIONATE HFA 80 UG/1
80 AEROSOL, METERED RESPIRATORY (INHALATION) TWICE DAILY
Qty: 1 | Refills: 4 | Status: ACTIVE | COMMUNITY
Start: 2025-04-11 | End: 1900-01-01

## 2025-04-11 RX ORDER — FAMOTIDINE 40 MG/1
40 TABLET, FILM COATED ORAL
Qty: 30 | Refills: 5 | Status: ACTIVE | COMMUNITY
Start: 2025-04-11 | End: 1900-01-01

## 2025-04-14 ENCOUNTER — RX RENEWAL (OUTPATIENT)
Age: 55
End: 2025-04-14

## 2025-04-23 ENCOUNTER — APPOINTMENT (OUTPATIENT)
Dept: INTERNAL MEDICINE | Facility: CLINIC | Age: 55
End: 2025-04-23

## 2025-04-30 ENCOUNTER — OUTPATIENT (OUTPATIENT)
Dept: OUTPATIENT SERVICES | Facility: HOSPITAL | Age: 55
LOS: 1 days | End: 2025-04-30
Payer: SELF-PAY

## 2025-04-30 ENCOUNTER — APPOINTMENT (OUTPATIENT)
Dept: INTERNAL MEDICINE | Facility: CLINIC | Age: 55
End: 2025-04-30
Payer: COMMERCIAL

## 2025-04-30 VITALS
OXYGEN SATURATION: 97 % | BODY MASS INDEX: 25.95 KG/M2 | WEIGHT: 152 LBS | SYSTOLIC BLOOD PRESSURE: 120 MMHG | HEIGHT: 64 IN | HEART RATE: 70 BPM | DIASTOLIC BLOOD PRESSURE: 70 MMHG

## 2025-04-30 DIAGNOSIS — Z21 ASYMPTOMATIC HUMAN IMMUNODEFICIENCY VIRUS [HIV] INFECTION STATUS: ICD-10-CM

## 2025-04-30 DIAGNOSIS — R51.9 HEADACHE, UNSPECIFIED: ICD-10-CM

## 2025-04-30 DIAGNOSIS — Z00.00 ENCOUNTER FOR GENERAL ADULT MEDICAL EXAMINATION W/OUT ABNORMAL FINDINGS: ICD-10-CM

## 2025-04-30 DIAGNOSIS — I10 ESSENTIAL (PRIMARY) HYPERTENSION: ICD-10-CM

## 2025-04-30 PROCEDURE — 80061 LIPID PANEL: CPT

## 2025-04-30 PROCEDURE — 99213 OFFICE O/P EST LOW 20 MIN: CPT | Mod: GE

## 2025-04-30 PROCEDURE — 80053 COMPREHEN METABOLIC PANEL: CPT

## 2025-04-30 PROCEDURE — G0463: CPT

## 2025-04-30 PROCEDURE — 83036 HEMOGLOBIN GLYCOSYLATED A1C: CPT

## 2025-04-30 PROCEDURE — 85027 COMPLETE CBC AUTOMATED: CPT

## 2025-05-02 LAB
ALBUMIN SERPL ELPH-MCNC: 4.6 G/DL
ALP BLD-CCNC: 112 U/L
ALT SERPL-CCNC: 36 U/L
ANION GAP SERPL CALC-SCNC: 13 MMOL/L
AST SERPL-CCNC: 28 U/L
BILIRUB SERPL-MCNC: 0.3 MG/DL
BUN SERPL-MCNC: 17 MG/DL
CALCIUM SERPL-MCNC: 9.5 MG/DL
CHLORIDE SERPL-SCNC: 103 MMOL/L
CHOLEST SERPL-MCNC: 137 MG/DL
CO2 SERPL-SCNC: 23 MMOL/L
CREAT SERPL-MCNC: 1.07 MG/DL
EGFRCR SERPLBLD CKD-EPI 2021: 82 ML/MIN/1.73M2
ESTIMATED AVERAGE GLUCOSE: 275 MG/DL
GLUCOSE SERPL-MCNC: 118 MG/DL
HBA1C MFR BLD HPLC: 11.2 %
HCT VFR BLD CALC: 44.2 %
HDLC SERPL-MCNC: 35 MG/DL
HGB BLD-MCNC: 14.4 G/DL
LDLC SERPL-MCNC: 67 MG/DL
MCHC RBC-ENTMCNC: 31 PG
MCHC RBC-ENTMCNC: 32.6 G/DL
MCV RBC AUTO: 95.1 FL
NONHDLC SERPL-MCNC: 102 MG/DL
PLATELET # BLD AUTO: 246 K/UL
POTASSIUM SERPL-SCNC: 4.5 MMOL/L
PROT SERPL-MCNC: 7.5 G/DL
RBC # BLD: 4.65 M/UL
RBC # FLD: 13 %
SODIUM SERPL-SCNC: 140 MMOL/L
TRIGL SERPL-MCNC: 215 MG/DL
WBC # FLD AUTO: 7.75 K/UL

## 2025-05-02 RX ORDER — METFORMIN HYDROCHLORIDE 1000 MG/1
1000 TABLET, FILM COATED, EXTENDED RELEASE ORAL TWICE DAILY
Qty: 1 | Refills: 1 | Status: ACTIVE | COMMUNITY
Start: 2025-05-02 | End: 1900-01-01

## 2025-05-05 DIAGNOSIS — Z21 ASYMPTOMATIC HUMAN IMMUNODEFICIENCY VIRUS [HIV] INFECTION STATUS: ICD-10-CM

## 2025-05-05 DIAGNOSIS — R51.9 HEADACHE, UNSPECIFIED: ICD-10-CM

## 2025-06-09 ENCOUNTER — APPOINTMENT (OUTPATIENT)
Dept: OTOLARYNGOLOGY | Facility: CLINIC | Age: 55
End: 2025-06-09
Payer: COMMERCIAL

## 2025-06-09 PROCEDURE — 31575 DIAGNOSTIC LARYNGOSCOPY: CPT

## 2025-06-09 PROCEDURE — 99212 OFFICE O/P EST SF 10 MIN: CPT | Mod: 25
